# Patient Record
Sex: FEMALE | Race: WHITE | NOT HISPANIC OR LATINO | Employment: OTHER | URBAN - METROPOLITAN AREA
[De-identification: names, ages, dates, MRNs, and addresses within clinical notes are randomized per-mention and may not be internally consistent; named-entity substitution may affect disease eponyms.]

---

## 2018-06-21 ENCOUNTER — OFFICE VISIT (OUTPATIENT)
Dept: URGENT CARE | Facility: CLINIC | Age: 53
End: 2018-06-21
Payer: MEDICARE

## 2018-06-21 VITALS
BODY MASS INDEX: 36.7 KG/M2 | WEIGHT: 215 LBS | RESPIRATION RATE: 18 BRPM | SYSTOLIC BLOOD PRESSURE: 117 MMHG | TEMPERATURE: 97 F | HEIGHT: 64 IN | OXYGEN SATURATION: 95 % | DIASTOLIC BLOOD PRESSURE: 85 MMHG | HEART RATE: 99 BPM

## 2018-06-21 DIAGNOSIS — K11.20 SIALADENITIS: Primary | ICD-10-CM

## 2018-06-21 PROCEDURE — 99213 OFFICE O/P EST LOW 20 MIN: CPT | Mod: S$GLB,,, | Performed by: INTERNAL MEDICINE

## 2018-06-21 PROCEDURE — 3008F BODY MASS INDEX DOCD: CPT | Mod: CPTII,S$GLB,, | Performed by: INTERNAL MEDICINE

## 2018-06-21 RX ORDER — CARBIDOPA, LEVODOPA AND ENTACAPONE 25; 200; 100 MG/1; MG/1; MG/1
1 TABLET, FILM COATED ORAL 3 TIMES DAILY
COMMUNITY

## 2018-06-21 RX ORDER — BACLOFEN 10 MG/1
10 TABLET ORAL 3 TIMES DAILY
COMMUNITY

## 2018-06-21 RX ORDER — AMOXICILLIN AND CLAVULANATE POTASSIUM 875; 125 MG/1; MG/1
1 TABLET, FILM COATED ORAL EVERY 12 HOURS
Qty: 20 TABLET | Refills: 0 | Status: SHIPPED | OUTPATIENT
Start: 2018-06-21 | End: 2018-07-01

## 2018-06-21 RX ORDER — DULOXETIN HYDROCHLORIDE 60 MG/1
60 CAPSULE, DELAYED RELEASE ORAL 2 TIMES DAILY
COMMUNITY

## 2018-06-21 RX ORDER — PYRIDOXINE HCL (VITAMIN B6) 100 MG
50 TABLET ORAL DAILY
COMMUNITY
End: 2023-06-15

## 2018-06-21 NOTE — PROGRESS NOTES
"Subjective:       Patient ID: Emma Morales is a 52 y.o. female.    Vitals:  height is 5' 4" (1.626 m) and weight is 97.5 kg (215 lb). Her temperature is 97.4 °F (36.3 °C). Her blood pressure is 117/85 and her pulse is 99. Her respiration is 18 and oxygen saturation is 95%.     Chief Complaint: Jaw Pain (Right side jaw pain started last ngiht )    Dental Pain    This is a new problem. The current episode started yesterday. The problem occurs constantly. The problem has been unchanged. The pain is mild. Pertinent negatives include no fever. The treatment provided no relief.     Review of Systems   Constitution: Negative for chills and fever.   HENT: Negative for sore throat.    Eyes: Negative for blurred vision.   Cardiovascular: Negative for chest pain.   Respiratory: Negative for shortness of breath.    Skin: Negative for rash.   Musculoskeletal: Negative for back pain and joint pain.   Gastrointestinal: Negative for abdominal pain, diarrhea, nausea and vomiting.   Neurological: Negative for headaches.   Psychiatric/Behavioral: The patient is not nervous/anxious.        Objective:      Physical Exam   Constitutional: She appears well-developed and well-nourished.   HENT:   Head: Normocephalic and atraumatic.   Mouth/Throat:       Eyes: Conjunctivae and EOM are normal. Pupils are equal, round, and reactive to light.   Neck: Normal range of motion. Neck supple.   Nursing note and vitals reviewed.      Assessment:       1. Sialadenitis        Plan:         Sialadenitis  -     amoxicillin-clavulanate 875-125mg (AUGMENTIN) 875-125 mg per tablet; Take 1 tablet by mouth every 12 (twelve) hours. for 10 days  Dispense: 20 tablet; Refill: 0          "

## 2020-05-24 ENCOUNTER — OFFICE VISIT (OUTPATIENT)
Dept: URGENT CARE | Facility: CLINIC | Age: 55
End: 2020-05-24
Payer: MEDICARE

## 2020-05-24 VITALS
HEART RATE: 112 BPM | TEMPERATURE: 99 F | SYSTOLIC BLOOD PRESSURE: 127 MMHG | DIASTOLIC BLOOD PRESSURE: 82 MMHG | OXYGEN SATURATION: 95 % | WEIGHT: 150 LBS | HEIGHT: 64 IN | BODY MASS INDEX: 25.61 KG/M2

## 2020-05-24 DIAGNOSIS — B34.9 VIRAL ILLNESS: Primary | ICD-10-CM

## 2020-05-24 DIAGNOSIS — R05.9 COUGH: ICD-10-CM

## 2020-05-24 DIAGNOSIS — R51.9 NONINTRACTABLE HEADACHE, UNSPECIFIED CHRONICITY PATTERN, UNSPECIFIED HEADACHE TYPE: ICD-10-CM

## 2020-05-24 DIAGNOSIS — R50.9 FEVER, UNSPECIFIED FEVER CAUSE: ICD-10-CM

## 2020-05-24 DIAGNOSIS — R52 GENERALIZED BODY ACHES: ICD-10-CM

## 2020-05-24 PROCEDURE — 99214 PR OFFICE/OUTPT VISIT, EST, LEVL IV, 30-39 MIN: ICD-10-PCS | Mod: S$GLB,,, | Performed by: NURSE PRACTITIONER

## 2020-05-24 PROCEDURE — 99214 OFFICE O/P EST MOD 30 MIN: CPT | Mod: S$GLB,,, | Performed by: NURSE PRACTITIONER

## 2020-05-24 PROCEDURE — U0003 INFECTIOUS AGENT DETECTION BY NUCLEIC ACID (DNA OR RNA); SEVERE ACUTE RESPIRATORY SYNDROME CORONAVIRUS 2 (SARS-COV-2) (CORONAVIRUS DISEASE [COVID-19]), AMPLIFIED PROBE TECHNIQUE, MAKING USE OF HIGH THROUGHPUT TECHNOLOGIES AS DESCRIBED BY CMS-2020-01-R: HCPCS

## 2020-05-24 RX ORDER — ROSUVASTATIN CALCIUM 10 MG/1
TABLET, COATED ORAL
COMMUNITY
Start: 2020-03-06 | End: 2023-02-15 | Stop reason: SDUPTHER

## 2020-05-24 NOTE — PROGRESS NOTES
"Subjective:       Patient ID: Emma Morales is a 54 y.o. female.    Vitals:  height is 5' 4" (1.626 m) and weight is 68 kg (150 lb). Her tympanic temperature is 99.2 °F (37.3 °C). Her blood pressure is 127/82 and her pulse is 112 (abnormal). Her oxygen saturation is 95%.     Chief Complaint: Cough    Pt here today for c/o nocturnal cough, headache, and body aches x3 days. Pt states cough has improved, but she is concerned about the body aches and possibility that symptoms may be related to COVID-19. Pt states she tried to get tested for COVID-19 at a testing site yesterday, but was asked to come another day. Pt recently recovered from bronchitis about 1 month ago. Symptoms had completely resolved before these new symptoms began 3 days ago. PMH significant for asthma. Pt states she has current rescue inhaler, and refills for new RX if needed. States she has not needed to use the inhaler since symptoms began. Denies SOB, wheezing, chest pain, ear pain, alteration in sense of smell or taste, sore throat, rash, or N/V/D.    Cough   This is a new problem. The current episode started in the past 7 days (3 days). The cough is non-productive. Associated symptoms include headaches and myalgias. Pertinent negatives include no chest pain, chills, ear congestion, ear pain, eye redness, fever, heartburn, hemoptysis, nasal congestion, postnasal drip, rash, rhinorrhea, sore throat, shortness of breath, sweats, weight loss or wheezing. Nothing aggravates the symptoms. Her past medical history is significant for asthma and bronchitis. There is no history of bronchiectasis, COPD, emphysema, environmental allergies or pneumonia.       Constitution: Negative for chills, sweating, fatigue and fever.   HENT: Negative for ear pain, congestion, postnasal drip, sinus pain, sinus pressure, sore throat and voice change.    Neck: Negative for painful lymph nodes.   Cardiovascular: Negative for chest pain.   Eyes: Negative for eye redness. "   Respiratory: Negative for chest tightness, cough, sputum production, bloody sputum, COPD, shortness of breath, stridor, wheezing and asthma.    Gastrointestinal: Negative for nausea, vomiting and heartburn.   Musculoskeletal: Positive for muscle ache.   Skin: Negative for rash.   Allergic/Immunologic: Negative for environmental allergies, seasonal allergies and asthma.   Neurological: Positive for headaches.   Hematologic/Lymphatic: Negative for swollen lymph nodes.       Objective:       Due to the state of emergency surrounding the COVID-19 pandemic, limited physical exam was performed today per [facility]'s current protocol.       Physical Exam   Constitutional: She is oriented to person, place, and time. She appears well-developed and well-nourished.   HENT:   Head: Normocephalic and atraumatic.   Right Ear: External ear normal.   Left Ear: External ear normal.   Eyes: Pupils are equal, round, and reactive to light. Conjunctivae and EOM are normal.   Neck: Normal range of motion. Neck supple.   Cardiovascular: Normal rate and regular rhythm.   Pulmonary/Chest: Effort normal. She has wheezes (fine) in the right lower field and the left lower field.   Neurological: She is alert and oriented to person, place, and time.   Psychiatric: She has a normal mood and affect. Her behavior is normal. Judgment and thought content normal.   Nursing note and vitals reviewed.        Assessment:       1. Viral illness    2. Cough    3. Fever, unspecified fever cause    4. Generalized body aches    5. Nonintractable headache, unspecified chronicity pattern, unspecified headache type        Plan:         Viral illness    Cough  -     COVID-19 Routine Screening    Fever, unspecified fever cause  -     COVID-19 Routine Screening    Generalized body aches  -     COVID-19 Routine Screening    Nonintractable headache, unspecified chronicity pattern, unspecified headache type  -     COVID-19 Routine Screening    Discussed diagnosis and  treatment plan today. All applicable EKG, medical records, labs, imaging reviewed in Epic and discussed with patient. Instructed to follow up with PCP or go to ER if symptoms fail to improve or worsen. Pt verbalizes understanding.       Patient Instructions   Instructions for Patients with Confirmed or Suspected COVID-19    If you are awaiting your test result, you will either be called or it will be released to the patient portal.  If you have any questions about your test, please visit [email or web address] or call our COVID-19 information line at [phone].       Stay home and stay away from family members and friends. The CDC says, you can leave home after these three things have happened: 1) You have had no fever for at least 72 hours (that is three full days of no fever without the use of medicine that reduces fevers) 2) AND other symptoms have improved (for example, when your cough or shortness of breath have improved) 3) AND at least 7 days have passed since your symptoms first appeared.   Separate yourself from other people and animals in your home.   Call ahead before visiting your doctor.   Wear a facemask.   Cover your coughs and sneezes.   Wash your hands often with soap and water; hand  can be used, too.   Avoid sharing personal household items.   Wipe down surfaces used daily.   Monitor your symptoms. Seek prompt medical attention if your illness is worsening (e.g., difficulty breathing).    Before seeking care, call your healthcare provider.   If you have a medical emergency and need to call 911, notify the dispatch personnel that you have, or are being evaluated for COVID-19. If possible, put on a facemask before emergency medical services arrive.        Recommended precautions for household members, intimate partners, and caregivers in a home setting of a patient with symptomatic laboratory-confirmed COVID-19 or a patient under investigation.  Household members,  intimate partners, and caregivers in the home setting awaiting tests results have close contact with a person with symptomatic, laboratory-confirmed COVID-19 or a person under investigation. Close contacts should monitor their health; they should call their provider right away if they develop symptoms suggestive of COVID-19 (e.g., fever, cough, shortness of breath).    Close contacts should also follow these recommendations:   Make sure that you understand and can help the patient follow their provider's instructions for medication(s) and care. You should help the patient with basic needs in the home and provide support for getting groceries, prescriptions, and other personal needs.   Monitor the patient's symptoms. If the patient is getting sicker, call his or her healthcare provider and tell them that the patient has laboratory-confirmed COVID-19. If the patient has a medical emergency and you need to call 911, notify the dispatch personnel that the patient has, or is being evaluated for COVID-19.   Household members should stay in another room or be  from the patient. Household members should use a separate bedroom and bathroom, if available.   Prohibit visitors.   Household members should care for any pets in the home.   Make sure that shared spaces in the home have good air flow, such as by an air conditioner or an opened window, weather permitting.   Perform hand hygiene frequently. Wash your hands often with soap and water for at least 20 seconds or use an alcohol-based hand  (that contains > 60% alcohol) covering all surfaces of your hands and rubbing them together until they feel dry. Soap and water should be used preferentially.   Avoid touching your eyes, nose, and mouth.   The patient should wear a facemask. If the patient is not able to wear a facemask (for example, because it causes trouble breathing), caregivers should wear a mask when they are in the same room as the  patient.   Wear a disposable facemask and gloves when you touch or have contact with the patient's blood, stool, or body fluids, such as saliva, sputum, nasal mucus, vomit, urine.  o Throw out disposable facemasks and gloves after using them. Do not reuse.  o When removing personal protective equipment, first remove and dispose of gloves. Then, immediately clean your hands with soap and water or alcohol-based hand . Next, remove and dispose of facemask, and immediately clean your hands again with soap and water or alcohol-based hand .   You should not share dishes, drinking glasses, cups, eating utensils, towels, bedding, or other items with the patient. After the patient uses these items, you should wash them thoroughly (see below Wash laundry thoroughly).   Clean all high-touch surfaces, such as counters, tabletops, doorknobs, bathroom fixtures, toilets, phones, keyboards, tablets, and bedside tables, every day. Also, clean any surfaces that may have blood, stool, or body fluids on them.   Use a household cleaning spray or wipe, according to the label instructions. Labels contain instructions for safe and effective use of the cleaning product including precautions you should take when applying the product, such as wearing gloves and making sure you have good ventilation during use of the product.   Wash laundry thoroughly.  o Immediately remove and wash clothes or bedding that have blood, stool, or body fluids on them.  o Wear disposable gloves while handling soiled items and keep soiled items away from your body. Clean your hands (with soap and water or an alcohol-based hand ) immediately after removing your gloves.  o Read and follow directions on labels of laundry or clothing items and detergent. In general, using a normal laundry detergent according to washing machine instructions and dry thoroughly using the warmest temperatures recommended on the clothing label.   Place  all used disposable gloves, facemasks, and other contaminated items in a lined container before disposing of them with other household waste. Clean your hands (with soap and water or an alcohol-based hand ) immediately after handling these items. Soap and water should be used preferentially if hands are visibly dirty.   Discuss any additional questions with your state or local health department or healthcare provider. Check available hours when contacting your local health department.    For more information see CDC link below.      https://www.cdc.gov/coronavirus/2019-ncov/hcp/guidance-prevent-spread.html#precautions        Sources:  CDC, [facility] and \[facility] [place] [facility]\""

## 2020-05-24 NOTE — PATIENT INSTRUCTIONS
Instructions for Patients with Confirmed or Suspected COVID-19    If you are awaiting your test result, you will either be called or it will be released to the patient portal.  If you have any questions about your test, please visit [email or web address] or call our COVID-19 information line at [phone].       Stay home and stay away from family members and friends. The CDC says, you can leave home after these three things have happened: 1) You have had no fever for at least 72 hours (that is three full days of no fever without the use of medicine that reduces fevers) 2) AND other symptoms have improved (for example, when your cough or shortness of breath have improved) 3) AND at least 7 days have passed since your symptoms first appeared.   Separate yourself from other people and animals in your home.   Call ahead before visiting your doctor.   Wear a facemask.   Cover your coughs and sneezes.   Wash your hands often with soap and water; hand  can be used, too.   Avoid sharing personal household items.   Wipe down surfaces used daily.   Monitor your symptoms. Seek prompt medical attention if your illness is worsening (e.g., difficulty breathing).    Before seeking care, call your healthcare provider.   If you have a medical emergency and need to call 911, notify the dispatch personnel that you have, or are being evaluated for COVID-19. If possible, put on a facemask before emergency medical services arrive.        Recommended precautions for household members, intimate partners, and caregivers in a home setting of a patient with symptomatic laboratory-confirmed COVID-19 or a patient under investigation.  Household members, intimate partners, and caregivers in the home setting awaiting tests results have close contact with a person with symptomatic, laboratory-confirmed COVID-19 or a person under investigation. Close contacts should monitor their health; they should call their  provider right away if they develop symptoms suggestive of COVID-19 (e.g., fever, cough, shortness of breath).    Close contacts should also follow these recommendations:   Make sure that you understand and can help the patient follow their provider's instructions for medication(s) and care. You should help the patient with basic needs in the home and provide support for getting groceries, prescriptions, and other personal needs.   Monitor the patient's symptoms. If the patient is getting sicker, call his or her healthcare provider and tell them that the patient has laboratory-confirmed COVID-19. If the patient has a medical emergency and you need to call 911, notify the dispatch personnel that the patient has, or is being evaluated for COVID-19.   Household members should stay in another room or be  from the patient. Household members should use a separate bedroom and bathroom, if available.   Prohibit visitors.   Household members should care for any pets in the home.   Make sure that shared spaces in the home have good air flow, such as by an air conditioner or an opened window, weather permitting.   Perform hand hygiene frequently. Wash your hands often with soap and water for at least 20 seconds or use an alcohol-based hand  (that contains > 60% alcohol) covering all surfaces of your hands and rubbing them together until they feel dry. Soap and water should be used preferentially.   Avoid touching your eyes, nose, and mouth.   The patient should wear a facemask. If the patient is not able to wear a facemask (for example, because it causes trouble breathing), caregivers should wear a mask when they are in the same room as the patient.   Wear a disposable facemask and gloves when you touch or have contact with the patient's blood, stool, or body fluids, such as saliva, sputum, nasal mucus, vomit, urine.  o Throw out disposable facemasks and gloves after using them. Do not  reuse.  o When removing personal protective equipment, first remove and dispose of gloves. Then, immediately clean your hands with soap and water or alcohol-based hand . Next, remove and dispose of facemask, and immediately clean your hands again with soap and water or alcohol-based hand .   You should not share dishes, drinking glasses, cups, eating utensils, towels, bedding, or other items with the patient. After the patient uses these items, you should wash them thoroughly (see below Wash laundry thoroughly).   Clean all high-touch surfaces, such as counters, tabletops, doorknobs, bathroom fixtures, toilets, phones, keyboards, tablets, and bedside tables, every day. Also, clean any surfaces that may have blood, stool, or body fluids on them.   Use a household cleaning spray or wipe, according to the label instructions. Labels contain instructions for safe and effective use of the cleaning product including precautions you should take when applying the product, such as wearing gloves and making sure you have good ventilation during use of the product.   Wash laundry thoroughly.  o Immediately remove and wash clothes or bedding that have blood, stool, or body fluids on them.  o Wear disposable gloves while handling soiled items and keep soiled items away from your body. Clean your hands (with soap and water or an alcohol-based hand ) immediately after removing your gloves.  o Read and follow directions on labels of laundry or clothing items and detergent. In general, using a normal laundry detergent according to washing machine instructions and dry thoroughly using the warmest temperatures recommended on the clothing label.   Place all used disposable gloves, facemasks, and other contaminated items in a lined container before disposing of them with other household waste. Clean your hands (with soap and water or an alcohol-based hand ) immediately after handling these  items. Soap and water should be used preferentially if hands are visibly dirty.   Discuss any additional questions with your state or local health department or healthcare provider. Check available hours when contacting your local health department.    For more information see CDC link below.      https://www.cdc.gov/coronavirus/2019-ncov/hcp/guidance-prevent-spread.html#precautions        Sources:  [facility], [facility] and [facility]

## 2020-05-26 ENCOUNTER — TELEPHONE (OUTPATIENT)
Dept: EMERGENCY MEDICINE | Facility: HOSPITAL | Age: 55
End: 2020-05-26

## 2020-05-26 LAB — SARS-COV-2 RNA RESP QL NAA+PROBE: NOT DETECTED

## 2020-05-26 NOTE — TELEPHONE ENCOUNTER
Patient left voicemail regarding covid-19 testing. Informed patient of negative COVID-19 PCR testing that was done at . Patient reports that she is currently symptomatic. Patient had questions regarding set up of [employer], advised patient to contact the Help Desk for further assistance in set-up.     Marie Garcia PA-C

## 2020-05-27 ENCOUNTER — TELEPHONE (OUTPATIENT)
Dept: URGENT CARE | Facility: CLINIC | Age: 55
End: 2020-05-27

## 2020-05-27 NOTE — TELEPHONE ENCOUNTER
----- Message from Darwin Huber MD sent at 5/26/2020  9:02 AM CDT -----  Call patient and let know covid test negative

## 2020-11-25 ENCOUNTER — OFFICE VISIT (OUTPATIENT)
Dept: URGENT CARE | Facility: CLINIC | Age: 55
End: 2020-11-25
Payer: MEDICARE

## 2020-11-25 VITALS
HEART RATE: 70 BPM | RESPIRATION RATE: 12 BRPM | TEMPERATURE: 98 F | BODY MASS INDEX: 24.99 KG/M2 | SYSTOLIC BLOOD PRESSURE: 126 MMHG | WEIGHT: 150 LBS | HEIGHT: 65 IN | OXYGEN SATURATION: 98 % | DIASTOLIC BLOOD PRESSURE: 84 MMHG

## 2020-11-25 DIAGNOSIS — J40 BRONCHITIS: Primary | ICD-10-CM

## 2020-11-25 DIAGNOSIS — R07.9 CHEST PAIN, UNSPECIFIED TYPE: ICD-10-CM

## 2020-11-25 LAB
CTP QC/QA: YES
SARS-COV-2 RDRP RESP QL NAA+PROBE: NEGATIVE

## 2020-11-25 PROCEDURE — 93005 ELECTROCARDIOGRAM TRACING: CPT | Mod: S$GLB,,, | Performed by: INTERNAL MEDICINE

## 2020-11-25 PROCEDURE — 71046 X-RAY EXAM CHEST 2 VIEWS: CPT | Mod: FY,S$GLB,, | Performed by: RADIOLOGY

## 2020-11-25 PROCEDURE — 93005 EKG 12-LEAD: ICD-10-PCS | Mod: S$GLB,,, | Performed by: INTERNAL MEDICINE

## 2020-11-25 PROCEDURE — 3008F PR BODY MASS INDEX (BMI) DOCUMENTED: ICD-10-PCS | Mod: CPTII,S$GLB,, | Performed by: INTERNAL MEDICINE

## 2020-11-25 PROCEDURE — U0002 COVID-19 LAB TEST NON-CDC: HCPCS | Mod: QW,S$GLB,, | Performed by: INTERNAL MEDICINE

## 2020-11-25 PROCEDURE — 93010 ELECTROCARDIOGRAM REPORT: CPT | Mod: S$GLB,,, | Performed by: INTERNAL MEDICINE

## 2020-11-25 PROCEDURE — 3008F BODY MASS INDEX DOCD: CPT | Mod: CPTII,S$GLB,, | Performed by: INTERNAL MEDICINE

## 2020-11-25 PROCEDURE — 99214 OFFICE O/P EST MOD 30 MIN: CPT | Mod: S$GLB,,, | Performed by: INTERNAL MEDICINE

## 2020-11-25 PROCEDURE — 71046 XR CHEST PA AND LATERAL: ICD-10-PCS | Mod: FY,S$GLB,, | Performed by: RADIOLOGY

## 2020-11-25 PROCEDURE — U0002: ICD-10-PCS | Mod: QW,S$GLB,, | Performed by: INTERNAL MEDICINE

## 2020-11-25 PROCEDURE — 93010 EKG 12-LEAD: ICD-10-PCS | Mod: S$GLB,,, | Performed by: INTERNAL MEDICINE

## 2020-11-25 PROCEDURE — 99214 PR OFFICE/OUTPT VISIT, EST, LEVL IV, 30-39 MIN: ICD-10-PCS | Mod: S$GLB,,, | Performed by: INTERNAL MEDICINE

## 2020-11-25 RX ORDER — AZITHROMYCIN 250 MG/1
TABLET, FILM COATED ORAL
Qty: 6 TABLET | Refills: 0 | Status: SHIPPED | OUTPATIENT
Start: 2020-11-25 | End: 2020-11-26

## 2020-11-25 RX ORDER — BENZONATATE 100 MG/1
100 CAPSULE ORAL 3 TIMES DAILY PRN
Qty: 30 CAPSULE | Refills: 0 | Status: SHIPPED | OUTPATIENT
Start: 2020-11-25 | End: 2020-12-05

## 2020-11-25 RX ORDER — BENZONATATE 100 MG/1
100 CAPSULE ORAL 3 TIMES DAILY PRN
Qty: 15 CAPSULE | Refills: 0 | Status: SHIPPED | OUTPATIENT
Start: 2020-11-25 | End: 2020-11-25

## 2020-11-25 NOTE — PROGRESS NOTES
"Subjective:       Patient ID: Emma Morales is a 55 y.o. female.    Vitals:  height is 5' 4.5" (1.638 m) and weight is 68 kg (150 lb). Her temperature is 97.8 °F (36.6 °C). Her blood pressure is 126/84 and her pulse is 70. Her respiration is 12 and oxygen saturation is 98%.     Chief Complaint: URI and COVID-19 Concerns    55-year-old female with past medical history Parkinson's and bipolar 1 disorder presents to urgent care complaining of fatigue, sore throat, dry cough, and headache that started 5 days ago.  Symptoms have been constant and severe since onset.  Patient denies recent positive COVID exposure that she is aware of.  Patient also reports having mild substernal chest pain radiating to both shoulders and left jaw that has been occurring intermittently (mostly with coughing - none at rest). Patient has been using an old inhaler and Tessalon Perles she had left over at home with minimal relief. Pt denies recent illness/travel, fever, chills, ear pain, sore throat, difficulty swallowing, nasal congestion, sinus pressure, loss of smell/taste, SOB, palpitations, leg pain/swelling, N/V/D, abdominal pain, back pain, neck pain, vision changes.      URI   This is a new problem. The current episode started in the past 7 days (friday morning). The problem has been unchanged. There has been no fever. Associated symptoms include coughing, headaches and a sore throat. Pertinent negatives include no abdominal pain, chest pain, congestion, diarrhea, dysuria, ear pain, joint pain, joint swelling, nausea, neck pain, plugged ear sensation, rash, rhinorrhea, sinus pain, sneezing, swollen glands, vomiting or wheezing. She has tried inhaler use (tesslon) for the symptoms. The treatment provided mild relief.       Constitution: Positive for fatigue. Negative for chills, sweating and fever.   HENT: Positive for sore throat. Negative for ear pain, congestion, sinus pain, sinus pressure and voice change.    Neck: Negative for neck " pain and painful lymph nodes.   Cardiovascular: Negative for chest pain.   Eyes: Negative for eye redness.   Respiratory: Positive for cough. Negative for chest tightness, sputum production, bloody sputum, COPD, shortness of breath, stridor, wheezing and asthma.    Gastrointestinal: Negative for abdominal pain, nausea, vomiting and diarrhea.   Genitourinary: Negative for dysuria.   Musculoskeletal: Negative for muscle ache.   Skin: Negative for rash.   Allergic/Immunologic: Negative for seasonal allergies, asthma and sneezing.   Neurological: Positive for headaches.   Hematologic/Lymphatic: Negative for swollen lymph nodes.       Objective:      Physical Exam   Constitutional: She is oriented to person, place, and time. She appears well-developed.  Non-toxic appearance. She does not appear ill. No distress.   HENT:   Head: Normocephalic and atraumatic.   Mouth/Throat: Mucous membranes are moist. Oropharynx is clear.   Neck: Neck supple.   Cardiovascular: Normal rate and regular rhythm.   Pulmonary/Chest: Effort normal. No respiratory distress.   Abdominal: Normal appearance.   Musculoskeletal: Normal range of motion.   Neurological: She is alert and oriented to person, place, and time.   Skin: Skin is warm, dry and not diaphoretic. Psychiatric: Her behavior is normal. Mood, judgment and thought content normal.   Nursing note and vitals reviewed.        Assessment:       1. Bronchitis    2. Chest pain, unspecified type        Plan:         Bronchitis  -     POCT COVID-19 Rapid Screening  -     XR CHEST PA AND LATERAL; Future; Expected date: 11/25/2020  -     Discontinue: benzonatate (TESSALON) 100 MG capsule; Take 1 capsule (100 mg total) by mouth 3 (three) times daily as needed.  Dispense: 15 capsule; Refill: 0  -     azithromycin (Z-ALEM) 250 MG tablet; Take 2 tablets by mouth on day 1; Take 1 tablet by mouth on days 2-5  Dispense: 6 tablet; Refill: 0  -     benzonatate (TESSALON) 100 MG capsule; Take 1 capsule (100  mg total) by mouth 3 (three) times daily as needed.  Dispense: 30 capsule; Refill: 0    Chest pain, unspecified type  -     IN OFFICE EKG 12-LEAD (to Muse)      Xr Chest Pa And Lateral    Result Date: 11/25/2020  EXAMINATION: CHEST PA AND LATERAL CLINICAL HISTORY: Cough TECHNIQUE: PA and lateral chest radiograph COMPARISON: 06/24/2010 FINDINGS: The cardiac silhouette is within normal limits.   There is no focal consolidation, pneumothorax, or pleural effusion. Mild spondylitic changes are present.     No acute intrathoracic abnormality. Electronically signed by: Michelle Patel Date:    11/25/2020 Time:    17:06      Results for orders placed or performed in visit on 11/25/20   POCT COVID-19 Rapid Screening   Result Value Ref Range    POC Rapid COVID Negative Negative     Acceptable Yes      EKG: NSR @ 64 bpm. Left Axis Deviation. Nonspecific T wave changes. No acute ST elevation/depression.       *Discussed results/diagnosis/plan with patient in clinic. Educated extensively on COVID19. Answered all of patient's questions and concerns. Patient was given strict ED instructions. Patient verbally understood and agreed with treatment plan.         Patient Instructions   Below are suggestions for symptomatic relief:    - Take Zpak as prescribed on a full stomach until you complete entire dose  - Tylenol every 6 hours as needed for pain/fever/chills/body aches  - Salt water gargles to soothe throat pain.  - Chloroseptic spray also helps to numb throat pain.  - Warm face compresses to help with facial sinus pain/pressure.  - Vicks vapor rub at night.  - Flonase OTC nasal spray for nasal congestion.  - Simple foods like chicken noodle soup, smoothies, hot tea with lemon and honey  - If you were not given a prescription cough medication, then Delsym OTC helps with coughing at night  - take tessalon pearls for daytime cough suppressant   - Zyrtec/Claritin during the day & Benadryl at night may help with any  allergies     If you DO NOT have Hypertension or any history of palpitations, it is ok to take over the counter Sudafed or Mucinex D or Allegra-D/Claritin-D/Zyrtec-D.  If you do take one of the above, it is ok to combine that with plain over the counter Mucinex or Allegra or Claritin or Zyrtec. If, for example, you are taking Zyrtec -D, you can combine that with Mucinex, but not Mucinex-D.  If you are taking Mucinex-D, you can combine that with plain Allegra or Claritin or Zyrtec.     If you DO have Hypertension or palpitations, it is safe to take Coricidin HBP for relief of sinus symptoms.      *Please follow up with your primary care provider within 2-5 days if your signs and symptoms have not resolved or worsen.    *Please go immediately to the Emergency Department if you develop shortness of breath, chest pain, and uncontrollable fever above 100.4F       Please arrange follow up with your primary care doctor as soon as possible. You must understand that you've received an Urgent Care treatment only and that you may be released before all of your medical problems are known or treated. You, the patient, will arrange for follow up as instructed. If your symptoms worsen or fail to improve you should go to the Emergency Room.      SOCIAL DISTANCE + WEAR A MASK :)    Instructions for Patients with Confirmed or Suspected COVID-19    If you are awaiting your test result, you will either be called or it will be released to the patient portal.  If you have any questions about your test, please visit [email or web address] or call our COVID-19 information line at [phone].      Instructions for non-hospitalized or discharged patients with confirmed or suspected COVID-19:       Stay home except to get medical care.    Separate yourself from other people and animals in your home.    Call ahead before visiting your doctor.    Wear a face mask.    Cover your coughs and sneezes.    Clean your hands often.     Avoid sharing personal household items.    Clean all high-touch surfaces every day.    Monitor your symptoms. Seek prompt medical attention if your illness is worsening (e.g., difficulty breathing). Before seeking care, call your healthcare provider.    If you have a medical emergency and must call 911, notify the dispatcher that you have or are being evaluated for COVID-19. If possible, put on a face mask before emergency medical services arrive.    Use the following symptom-based strategy to return to normal activity following a suspected or confirmed case of COVID-19. Continue isolation until:   o At least 3 days (72 hours) have passed since recovery defined as resolution of fever without the use of fever-reducing medications and improvement in respiratory symptoms (e.g. cough, shortness of breath), and   o At least 10 days have passed since the first positive test.       As one of the next steps, you will receive a call or text from the [place] Department of Health ([facility]) COVID-19 Tracing Team. See the contact information below so you know not to ignore the health departments call. It is important that you contact them back immediately so they can help.     Contact Tracer Number:  [phone]  Caller ID for most carriers: [facility]    What is contact tracing?   Contact tracing is a process that helps identify everyone who has been in close contact with an infected person. Contact tracers let those people know they may have been exposed and guide them on next steps. Confidentiality is important for everyone; no one will be told who may have exposed them to the virus.   Your involvement is important. The more we know about where and how this virus is spreading, the better chance we have at stopping it from spreading further.  What does exposure mean?   Exposure means you have been within 6 feet for more than 15 minutes with a person who has or had COVID-19.  What kind of questions do the  contact tracers ask?   A contact tracer will confirm your basic contact information including name, address, phone number, and next of kin, as well as asking about any symptoms you may have had. Theyll also ask you how you think you may have gotten sick, such as places where you may have been exposed to the virus, and people you were with. Those names will never be shared with anyone outside of that call, and will only be used to help trace and stop the spread of the virus.   I have privacy concerns. How will the state use my information?   Your privacy about your health is important. All calls are completed using call centers that use the appropriate health privacy protection measures (HIPAA compliance), meaning that your patient information is safe. No one will ever ask you any questions related to immigration status. Your health comes first.   Do I have to participate?   You do not have to participate, but we strongly encourage you to. Contact tracing can help us catch and control new outbreaks as theyre developing to keep your friends and family safe.   What if I dont hear from anyone?   If you dont receive a call within 24 hours, you can call the number above right away to inquire about your status. That line is open from 8:00 am - 8:00 p.m., 7 days a week.  Contact tracing saves lives! Together, we have the power to beat this virus and keep our loved ones and neighbors safe.       Instructions for household members, intimate partners and caregivers in a non-healthcare setting of a patient with confirmed or suspected COVID-19:         Close contacts should monitor their health and call their healthcare provider right away if they develop symptoms suggestive of COVID-19 (e.g., fever, cough, shortness of breath).    Stay home except to get medical care. Separate yourself from other people and animals in the home.   Monitor the patients symptoms. If the patient is getting sicker, call his or her  healthcare provider. If the patient has a medical emergency and you need to call 911, notify the dispatch personnel that the patient has or is being evaluated for COVID-19.    Wear a facemask when around other people such as sharing a room or vehicle and before entering a healthcare provider's office.   Cover coughs and sneezes with a tissue. Throw used tissues in a lined trash can immediately and wash hands.   Clean hands often with soap and water for at least 20 seconds or with an alcohol-based hand , rubbing hands together until they feel dry. Avoid touching your eyes, nose, and mouth with unwashed hands.   Clean all high-touch; surfaces every day, including counters, tabletops, doorknobs, bathroom fixtures, toilets, phones, keyboards, tablets, bedside tables, etc. Use a household cleaning spray or wipe according to label instructions.   Avoid sharing personal household items such as dishes, drinking glasses, cups, towels, bedding, etc. After these items are used, they should be washed thoroughly with soap and water.   Continue isolation until:   At least 3 days (72 hours) have passed since recovery defined as resolution of fever without the use of fever-reducing medications and improvement in respiratory symptoms (e.g. cough, shortness of breath), and    At least 10 days have passed since the patients first positive test.    https://www.cdc.gov/coronavirus/2019-ncov/your-health/index.htm

## 2020-11-25 NOTE — PATIENT INSTRUCTIONS
Below are suggestions for symptomatic relief:    - Take Zpak as prescribed on a full stomach until you complete entire dose  - Tylenol every 6 hours as needed for pain/fever/chills/body aches  - Salt water gargles to soothe throat pain.  - Chloroseptic spray also helps to numb throat pain.  - Warm face compresses to help with facial sinus pain/pressure.  - Vicks vapor rub at night.  - Flonase OTC nasal spray for nasal congestion.  - Simple foods like chicken noodle soup, smoothies, hot tea with lemon and honey  - If you were not given a prescription cough medication, then Delsym OTC helps with coughing at night  - take tessalon pearls for daytime cough suppressant   - Zyrtec/Claritin during the day & Benadryl at night may help with any allergies     If you DO NOT have Hypertension or any history of palpitations, it is ok to take over the counter Sudafed or Mucinex D or Allegra-D/Claritin-D/Zyrtec-D.  If you do take one of the above, it is ok to combine that with plain over the counter Mucinex or Allegra or Claritin or Zyrtec. If, for example, you are taking Zyrtec -D, you can combine that with Mucinex, but not Mucinex-D.  If you are taking Mucinex-D, you can combine that with plain Allegra or Claritin or Zyrtec.     If you DO have Hypertension or palpitations, it is safe to take Coricidin HBP for relief of sinus symptoms.      *Please follow up with your primary care provider within 2-5 days if your signs and symptoms have not resolved or worsen.    *Please go immediately to the Emergency Department if you develop shortness of breath, chest pain, and uncontrollable fever above 100.4F       Please arrange follow up with your primary care doctor as soon as possible. You must understand that you've received an Urgent Care treatment only and that you may be released before all of your medical problems are known or treated. You, the patient, will arrange for follow up as instructed. If your symptoms worsen or fail to  improve you should go to the Emergency Room.      SOCIAL DISTANCE + WEAR A MASK :)    Instructions for Patients with Confirmed or Suspected COVID-19    If you are awaiting your test result, you will either be called or it will be released to the patient portal.  If you have any questions about your test, please visit [email or web address] or call our COVID-19 information line at [phone].      Instructions for non-hospitalized or discharged patients with confirmed or suspected COVID-19:       Stay home except to get medical care.    Separate yourself from other people and animals in your home.    Call ahead before visiting your doctor.    Wear a face mask.    Cover your coughs and sneezes.    Clean your hands often.    Avoid sharing personal household items.    Clean all high-touch surfaces every day.    Monitor your symptoms. Seek prompt medical attention if your illness is worsening (e.g., difficulty breathing). Before seeking care, call your healthcare provider.    If you have a medical emergency and must call 911, notify the dispatcher that you have or are being evaluated for COVID-19. If possible, put on a face mask before emergency medical services arrive.    Use the following symptom-based strategy to return to normal activity following a suspected or confirmed case of COVID-19. Continue isolation until:   o At least 3 days (72 hours) have passed since recovery defined as resolution of fever without the use of fever-reducing medications and improvement in respiratory symptoms (e.g. cough, shortness of breath), and   o At least 10 days have passed since the first positive test.       As one of the next steps, you will receive a call or text from the [place] Department of Health ([facility]) COVID-19 Tracing Team. See the contact information below so you know not to ignore the health departments call. It is important that you contact them back immediately so they can help.     Contact  Tracer Number:  [phone]  Caller ID for most carriers: [facility]    What is contact tracing?   Contact tracing is a process that helps identify everyone who has been in close contact with an infected person. Contact tracers let those people know they may have been exposed and guide them on next steps. Confidentiality is important for everyone; no one will be told who may have exposed them to the virus.   Your involvement is important. The more we know about where and how this virus is spreading, the better chance we have at stopping it from spreading further.  What does exposure mean?   Exposure means you have been within 6 feet for more than 15 minutes with a person who has or had COVID-19.  What kind of questions do the contact tracers ask?   A contact tracer will confirm your basic contact information including name, address, phone number, and next of kin, as well as asking about any symptoms you may have had. Theyll also ask you how you think you may have gotten sick, such as places where you may have been exposed to the virus, and people you were with. Those names will never be shared with anyone outside of that call, and will only be used to help trace and stop the spread of the virus.   I have privacy concerns. How will the state use my information?   Your privacy about your health is important. All calls are completed using call centers that use the appropriate health privacy protection measures (HIPAA compliance), meaning that your patient information is safe. No one will ever ask you any questions related to immigration status. Your health comes first.   Do I have to participate?   You do not have to participate, but we strongly encourage you to. Contact tracing can help us catch and control new outbreaks as theyre developing to keep your friends and family safe.   What if I dont hear from anyone?   If you dont receive a call within 24 hours, you can call the number above right away  to inquire about your status. That line is open from 8:00 am - 8:00 p.m., 7 days a week.  Contact tracing saves lives! Together, we have the power to beat this virus and keep our loved ones and neighbors safe.       Instructions for household members, intimate partners and caregivers in a non-healthcare setting of a patient with confirmed or suspected COVID-19:         Close contacts should monitor their health and call their healthcare provider right away if they develop symptoms suggestive of COVID-19 (e.g., fever, cough, shortness of breath).    Stay home except to get medical care. Separate yourself from other people and animals in the home.   Monitor the patients symptoms. If the patient is getting sicker, call his or her healthcare provider. If the patient has a medical emergency and you need to call 911, notify the dispatch personnel that the patient has or is being evaluated for COVID-19.    Wear a facemask when around other people such as sharing a room or vehicle and before entering a healthcare provider's office.   Cover coughs and sneezes with a tissue. Throw used tissues in a lined trash can immediately and wash hands.   Clean hands often with soap and water for at least 20 seconds or with an alcohol-based hand , rubbing hands together until they feel dry. Avoid touching your eyes, nose, and mouth with unwashed hands.   Clean all high-touch; surfaces every day, including counters, tabletops, doorknobs, bathroom fixtures, toilets, phones, keyboards, tablets, bedside tables, etc. Use a household cleaning spray or wipe according to label instructions.   Avoid sharing personal household items such as dishes, drinking glasses, cups, towels, bedding, etc. After these items are used, they should be washed thoroughly with soap and water.   Continue isolation until:   At least 3 days (72 hours) have passed since recovery defined as resolution of fever without the use of fever-reducing  medications and improvement in respiratory symptoms (e.g. cough, shortness of breath), and    At least 10 days have passed since the patients first positive test.    https://www.cdc.gov/coronavirus/2019-ncov/your-health/index.htm

## 2020-11-26 RX ORDER — AZITHROMYCIN 250 MG/1
TABLET, FILM COATED ORAL
Qty: 6 TABLET | Refills: 0 | Status: SHIPPED | OUTPATIENT
Start: 2020-11-26 | End: 2020-12-01

## 2021-05-17 ENCOUNTER — OFFICE VISIT (OUTPATIENT)
Dept: URGENT CARE | Facility: CLINIC | Age: 56
End: 2021-05-17
Payer: MEDICARE

## 2021-05-17 VITALS
WEIGHT: 150 LBS | TEMPERATURE: 99 F | DIASTOLIC BLOOD PRESSURE: 75 MMHG | HEIGHT: 65 IN | OXYGEN SATURATION: 94 % | SYSTOLIC BLOOD PRESSURE: 122 MMHG | BODY MASS INDEX: 24.99 KG/M2 | HEART RATE: 83 BPM | RESPIRATION RATE: 18 BRPM

## 2021-05-17 DIAGNOSIS — T14.8XXA BLISTER: Primary | ICD-10-CM

## 2021-05-17 PROCEDURE — 3008F BODY MASS INDEX DOCD: CPT | Mod: CPTII,S$GLB,, | Performed by: NURSE PRACTITIONER

## 2021-05-17 PROCEDURE — 99214 PR OFFICE/OUTPT VISIT, EST, LEVL IV, 30-39 MIN: ICD-10-PCS | Mod: S$GLB,,, | Performed by: NURSE PRACTITIONER

## 2021-05-17 PROCEDURE — 3008F PR BODY MASS INDEX (BMI) DOCUMENTED: ICD-10-PCS | Mod: CPTII,S$GLB,, | Performed by: NURSE PRACTITIONER

## 2021-05-17 PROCEDURE — 99214 OFFICE O/P EST MOD 30 MIN: CPT | Mod: S$GLB,,, | Performed by: NURSE PRACTITIONER

## 2021-05-17 RX ORDER — MUPIROCIN 20 MG/G
OINTMENT TOPICAL
Qty: 22 G | Refills: 1 | Status: SHIPPED | OUTPATIENT
Start: 2021-05-17

## 2021-05-28 ENCOUNTER — OFFICE VISIT (OUTPATIENT)
Dept: URGENT CARE | Facility: CLINIC | Age: 56
End: 2021-05-28
Payer: MEDICARE

## 2021-05-28 VITALS
DIASTOLIC BLOOD PRESSURE: 83 MMHG | HEART RATE: 83 BPM | HEIGHT: 65 IN | TEMPERATURE: 99 F | OXYGEN SATURATION: 95 % | SYSTOLIC BLOOD PRESSURE: 115 MMHG | WEIGHT: 160 LBS | BODY MASS INDEX: 26.66 KG/M2

## 2021-05-28 DIAGNOSIS — N61.0 CELLULITIS OF LEFT BREAST: Primary | ICD-10-CM

## 2021-05-28 DIAGNOSIS — L03.032 CELLULITIS OF LEFT TOE: ICD-10-CM

## 2021-05-28 PROCEDURE — 3008F PR BODY MASS INDEX (BMI) DOCUMENTED: ICD-10-PCS | Mod: CPTII,S$GLB,, | Performed by: NURSE PRACTITIONER

## 2021-05-28 PROCEDURE — 3008F BODY MASS INDEX DOCD: CPT | Mod: CPTII,S$GLB,, | Performed by: NURSE PRACTITIONER

## 2021-05-28 PROCEDURE — 99213 PR OFFICE/OUTPT VISIT, EST, LEVL III, 20-29 MIN: ICD-10-PCS | Mod: S$GLB,,, | Performed by: NURSE PRACTITIONER

## 2021-05-28 PROCEDURE — 99213 OFFICE O/P EST LOW 20 MIN: CPT | Mod: S$GLB,,, | Performed by: NURSE PRACTITIONER

## 2021-05-28 RX ORDER — CEPHALEXIN 500 MG/1
500 CAPSULE ORAL EVERY 6 HOURS
Qty: 28 CAPSULE | Refills: 0 | Status: SHIPPED | OUTPATIENT
Start: 2021-05-28 | End: 2021-05-29

## 2021-05-29 ENCOUNTER — TELEPHONE (OUTPATIENT)
Dept: URGENT CARE | Facility: CLINIC | Age: 56
End: 2021-05-29

## 2021-05-29 DIAGNOSIS — L03.032 CELLULITIS OF TOE OF LEFT FOOT: Primary | ICD-10-CM

## 2021-05-29 DIAGNOSIS — N61.0 CELLULITIS OF LEFT BREAST: ICD-10-CM

## 2021-05-29 RX ORDER — CLINDAMYCIN HYDROCHLORIDE 300 MG/1
300 CAPSULE ORAL EVERY 8 HOURS
Qty: 21 CAPSULE | Refills: 0 | Status: SHIPPED | OUTPATIENT
Start: 2021-05-29 | End: 2021-06-05

## 2022-05-19 ENCOUNTER — OFFICE VISIT (OUTPATIENT)
Dept: URGENT CARE | Facility: CLINIC | Age: 57
End: 2022-05-19
Payer: MEDICARE

## 2022-05-19 VITALS
HEART RATE: 80 BPM | OXYGEN SATURATION: 95 % | DIASTOLIC BLOOD PRESSURE: 67 MMHG | SYSTOLIC BLOOD PRESSURE: 99 MMHG | HEIGHT: 64 IN | TEMPERATURE: 98 F | BODY MASS INDEX: 27.33 KG/M2 | RESPIRATION RATE: 16 BRPM | WEIGHT: 160.06 LBS

## 2022-05-19 DIAGNOSIS — S60.521A BLISTER OF RIGHT HAND, INITIAL ENCOUNTER: Primary | ICD-10-CM

## 2022-05-19 PROCEDURE — 3008F BODY MASS INDEX DOCD: CPT | Mod: CPTII,S$GLB,, | Performed by: STUDENT IN AN ORGANIZED HEALTH CARE EDUCATION/TRAINING PROGRAM

## 2022-05-19 PROCEDURE — 1159F PR MEDICATION LIST DOCUMENTED IN MEDICAL RECORD: ICD-10-PCS | Mod: CPTII,S$GLB,, | Performed by: STUDENT IN AN ORGANIZED HEALTH CARE EDUCATION/TRAINING PROGRAM

## 2022-05-19 PROCEDURE — 99213 PR OFFICE/OUTPT VISIT, EST, LEVL III, 20-29 MIN: ICD-10-PCS | Mod: S$GLB,,, | Performed by: STUDENT IN AN ORGANIZED HEALTH CARE EDUCATION/TRAINING PROGRAM

## 2022-05-19 PROCEDURE — 3074F PR MOST RECENT SYSTOLIC BLOOD PRESSURE < 130 MM HG: ICD-10-PCS | Mod: CPTII,S$GLB,, | Performed by: STUDENT IN AN ORGANIZED HEALTH CARE EDUCATION/TRAINING PROGRAM

## 2022-05-19 PROCEDURE — 3078F DIAST BP <80 MM HG: CPT | Mod: CPTII,S$GLB,, | Performed by: STUDENT IN AN ORGANIZED HEALTH CARE EDUCATION/TRAINING PROGRAM

## 2022-05-19 PROCEDURE — 1160F PR REVIEW ALL MEDS BY PRESCRIBER/CLIN PHARMACIST DOCUMENTED: ICD-10-PCS | Mod: CPTII,S$GLB,, | Performed by: STUDENT IN AN ORGANIZED HEALTH CARE EDUCATION/TRAINING PROGRAM

## 2022-05-19 PROCEDURE — 3078F PR MOST RECENT DIASTOLIC BLOOD PRESSURE < 80 MM HG: ICD-10-PCS | Mod: CPTII,S$GLB,, | Performed by: STUDENT IN AN ORGANIZED HEALTH CARE EDUCATION/TRAINING PROGRAM

## 2022-05-19 PROCEDURE — 99213 OFFICE O/P EST LOW 20 MIN: CPT | Mod: S$GLB,,, | Performed by: STUDENT IN AN ORGANIZED HEALTH CARE EDUCATION/TRAINING PROGRAM

## 2022-05-19 PROCEDURE — 1160F RVW MEDS BY RX/DR IN RCRD: CPT | Mod: CPTII,S$GLB,, | Performed by: STUDENT IN AN ORGANIZED HEALTH CARE EDUCATION/TRAINING PROGRAM

## 2022-05-19 PROCEDURE — 1159F MED LIST DOCD IN RCRD: CPT | Mod: CPTII,S$GLB,, | Performed by: STUDENT IN AN ORGANIZED HEALTH CARE EDUCATION/TRAINING PROGRAM

## 2022-05-19 PROCEDURE — 3008F PR BODY MASS INDEX (BMI) DOCUMENTED: ICD-10-PCS | Mod: CPTII,S$GLB,, | Performed by: STUDENT IN AN ORGANIZED HEALTH CARE EDUCATION/TRAINING PROGRAM

## 2022-05-19 PROCEDURE — 3074F SYST BP LT 130 MM HG: CPT | Mod: CPTII,S$GLB,, | Performed by: STUDENT IN AN ORGANIZED HEALTH CARE EDUCATION/TRAINING PROGRAM

## 2022-05-19 NOTE — PATIENT INSTRUCTIONS
Begin to apply topical antibiotic ointment to the affected areas 2-3 times daily.     Keep your hands clean and dry.     Ok to keep the blisters covered with band-aids.

## 2022-05-19 NOTE — PROGRESS NOTES
"Subjective:       Patient ID: Emma Morales is a 56 y.o. female.    Vitals:  height is 5' 4" (1.626 m) and weight is 72.6 kg (160 lb 0.9 oz). Her temperature is 98.2 °F (36.8 °C). Her blood pressure is 99/67 and her pulse is 80. Her respiration is 16 and oxygen saturation is 95%.     Chief Complaint: Hand Injury    Patient states that she was trying to open her door and kept using the credit card to unlock the door and scratched her hand up on yesterday evening.     Hand Injury   Her dominant hand is their right hand. The incident occurred 12 to 24 hours ago. The incident occurred at home. There was no injury mechanism. The pain is present in the right hand. Quality: tightness and throbbing and sharp joint pain. The pain radiates to the right arm and left arm. The pain is at a severity of 9/10. The pain is severe. The pain has been constant since the incident. Nothing aggravates the symptoms.     ROS    Objective:      Physical Exam   Constitutional: She is oriented to person, place, and time. She does not appear ill. No distress.   HENT:   Head: Normocephalic.   Eyes: Conjunctivae are normal.   Pulmonary/Chest: No respiratory distress.   Neurological: She is alert and oriented to person, place, and time. Coordination normal.   Skin: Skin is warm and dry.         Comments: 2 blisters, one on thumb and one index finger of right hand. Index finger blister is open with clear drainage, thumb scabbing over. No surrounding erythema or warmth. Mild swelling.    Psychiatric: Her behavior is normal. Mood and thought content normal.   Nursing note and vitals reviewed.        Assessment:       1. Blister of right hand, initial encounter          Plan:         Blister of right hand, initial encounter    Avoid exacerbating activities (I.e. using a credit card to unlock doors) to avoid further friction. Wound care discussed. Supportive care. F/u for signs of infection            "

## 2022-12-07 ENCOUNTER — OFFICE VISIT (OUTPATIENT)
Dept: URGENT CARE | Facility: CLINIC | Age: 57
End: 2022-12-07
Payer: MEDICARE

## 2022-12-07 VITALS
TEMPERATURE: 98 F | BODY MASS INDEX: 27.31 KG/M2 | RESPIRATION RATE: 18 BRPM | HEART RATE: 81 BPM | OXYGEN SATURATION: 96 % | WEIGHT: 160 LBS | DIASTOLIC BLOOD PRESSURE: 79 MMHG | HEIGHT: 64 IN | SYSTOLIC BLOOD PRESSURE: 129 MMHG

## 2022-12-07 DIAGNOSIS — Z87.09 HISTORY OF BRONCHITIS: ICD-10-CM

## 2022-12-07 DIAGNOSIS — J06.9 URI, ACUTE: ICD-10-CM

## 2022-12-07 DIAGNOSIS — R05.1 ACUTE COUGH: Primary | ICD-10-CM

## 2022-12-07 LAB
CTP QC/QA: YES
SARS-COV-2 AG RESP QL IA.RAPID: NEGATIVE

## 2022-12-07 PROCEDURE — 1159F MED LIST DOCD IN RCRD: CPT | Mod: CPTII,S$GLB,, | Performed by: FAMILY MEDICINE

## 2022-12-07 PROCEDURE — U0002 SARS CORONAVIRUS 2 ANTIGEN POCT, MANUAL READ: ICD-10-PCS | Mod: QW,S$GLB,, | Performed by: FAMILY MEDICINE

## 2022-12-07 PROCEDURE — 3074F PR MOST RECENT SYSTOLIC BLOOD PRESSURE < 130 MM HG: ICD-10-PCS | Mod: CPTII,S$GLB,, | Performed by: FAMILY MEDICINE

## 2022-12-07 PROCEDURE — 3074F SYST BP LT 130 MM HG: CPT | Mod: CPTII,S$GLB,, | Performed by: FAMILY MEDICINE

## 2022-12-07 PROCEDURE — 99213 OFFICE O/P EST LOW 20 MIN: CPT | Mod: S$GLB,,, | Performed by: FAMILY MEDICINE

## 2022-12-07 PROCEDURE — 1159F PR MEDICATION LIST DOCUMENTED IN MEDICAL RECORD: ICD-10-PCS | Mod: CPTII,S$GLB,, | Performed by: FAMILY MEDICINE

## 2022-12-07 PROCEDURE — U0002 COVID-19 LAB TEST NON-CDC: HCPCS | Mod: QW,S$GLB,, | Performed by: FAMILY MEDICINE

## 2022-12-07 PROCEDURE — 3008F PR BODY MASS INDEX (BMI) DOCUMENTED: ICD-10-PCS | Mod: CPTII,S$GLB,, | Performed by: FAMILY MEDICINE

## 2022-12-07 PROCEDURE — 3008F BODY MASS INDEX DOCD: CPT | Mod: CPTII,S$GLB,, | Performed by: FAMILY MEDICINE

## 2022-12-07 PROCEDURE — 3078F DIAST BP <80 MM HG: CPT | Mod: CPTII,S$GLB,, | Performed by: FAMILY MEDICINE

## 2022-12-07 PROCEDURE — 99213 PR OFFICE/OUTPT VISIT, EST, LEVL III, 20-29 MIN: ICD-10-PCS | Mod: S$GLB,,, | Performed by: FAMILY MEDICINE

## 2022-12-07 PROCEDURE — 3078F PR MOST RECENT DIASTOLIC BLOOD PRESSURE < 80 MM HG: ICD-10-PCS | Mod: CPTII,S$GLB,, | Performed by: FAMILY MEDICINE

## 2022-12-07 PROCEDURE — 1160F RVW MEDS BY RX/DR IN RCRD: CPT | Mod: CPTII,S$GLB,, | Performed by: FAMILY MEDICINE

## 2022-12-07 PROCEDURE — 1160F PR REVIEW ALL MEDS BY PRESCRIBER/CLIN PHARMACIST DOCUMENTED: ICD-10-PCS | Mod: CPTII,S$GLB,, | Performed by: FAMILY MEDICINE

## 2022-12-07 RX ORDER — AZITHROMYCIN 250 MG/1
TABLET, FILM COATED ORAL
Qty: 6 TABLET | Refills: 0 | Status: SHIPPED | OUTPATIENT
Start: 2022-12-07 | End: 2022-12-12

## 2022-12-07 RX ORDER — GLYCOPYRROLATE AND FORMOTEROL FUMARATE 9; 4.8 UG/1; UG/1
2 AEROSOL, METERED RESPIRATORY (INHALATION) 2 TIMES DAILY
Qty: 10.7 G | Refills: 0 | Status: SHIPPED | OUTPATIENT
Start: 2022-12-07

## 2022-12-07 RX ORDER — BENZONATATE 200 MG/1
200 CAPSULE ORAL 3 TIMES DAILY PRN
Qty: 30 CAPSULE | Refills: 0 | Status: SHIPPED | OUTPATIENT
Start: 2022-12-07 | End: 2022-12-17

## 2022-12-07 RX ORDER — FLUTICASONE PROPIONATE 50 MCG
2 SPRAY, SUSPENSION (ML) NASAL 2 TIMES DAILY PRN
Qty: 9.9 ML | Refills: 0 | Status: SHIPPED | OUTPATIENT
Start: 2022-12-07 | End: 2023-02-15

## 2022-12-07 NOTE — PROGRESS NOTES
"Subjective:       Patient ID: Emma Morales is a 57 y.o. female.    Vitals:  height is 5' 4" (1.626 m) and weight is 72.6 kg (160 lb). Her temperature is 97.6 °F (36.4 °C). Her blood pressure is 129/79 and her pulse is 81. Her respiration is 18 and oxygen saturation is 96%.     Chief Complaint: Cough    Pt complains x4 days of cough, nasal congestion, post nasal drip, sore throat, bodyache, nausea, headache, sinus pressure. Patient states her PCP sent over prescriptions for Z-ghulam and Bevespi (some sort of error/ issue with the medications) and she wants me to resend medications to her pharmacy.    Cough  This is a new problem. The current episode started in the past 7 days. The problem has been unchanged. The problem occurs constantly. The cough is Non-productive. Associated symptoms include headaches, nasal congestion, postnasal drip and a sore throat. Pertinent negatives include no chest pain, chills, ear congestion, ear pain, fever, heartburn, hemoptysis, myalgias, rash, rhinorrhea, shortness of breath, sweats, weight loss or wheezing.     Constitution: Negative for chills and fever.   HENT:  Positive for postnasal drip and sore throat. Negative for ear pain.    Cardiovascular:  Negative for chest pain.   Respiratory:  Positive for cough. Negative for bloody sputum, shortness of breath and wheezing.    Gastrointestinal:  Negative for heartburn.   Musculoskeletal:  Negative for muscle ache.   Skin:  Negative for rash.   Neurological:  Positive for headaches.     Objective:      Vitals:    12/07/22 1723   BP: 129/79   Pulse: 81   Resp: 18   Temp: 97.6 °F (36.4 °C)   SpO2: 96%   Weight: 72.6 kg (160 lb)   Height: 5' 4" (1.626 m)      Physical Exam   Constitutional: She is oriented to person, place, and time. She appears well-developed. She is cooperative.  Non-toxic appearance. She does not appear ill. No distress.   HENT:   Head: Normocephalic and atraumatic.   Ears:   Right Ear: Hearing, tympanic membrane, " external ear and ear canal normal.   Left Ear: Hearing, tympanic membrane, external ear and ear canal normal.   Nose: Congestion present. No mucosal edema, rhinorrhea or nasal deformity. No epistaxis. Right sinus exhibits no maxillary sinus tenderness and no frontal sinus tenderness. Left sinus exhibits no maxillary sinus tenderness and no frontal sinus tenderness.   Mouth/Throat: Uvula is midline and mucous membranes are normal. No trismus in the jaw. Normal dentition. No uvula swelling. Posterior oropharyngeal erythema present. No oropharyngeal exudate or posterior oropharyngeal edema.   Eyes: Conjunctivae and lids are normal. No scleral icterus.   Neck: Trachea normal and phonation normal. Neck supple. No edema present. No erythema present. No neck rigidity present.   Cardiovascular: Normal rate, regular rhythm, normal heart sounds and normal pulses.   Pulmonary/Chest: Effort normal and breath sounds normal. No respiratory distress. She has no decreased breath sounds. She has no rhonchi.   Abdominal: Normal appearance and bowel sounds are normal. Soft.   Musculoskeletal: Normal range of motion.         General: No deformity. Normal range of motion.   Neurological: She is alert and oriented to person, place, and time. She exhibits normal muscle tone. Coordination normal.   Skin: Skin is warm, dry, intact, not diaphoretic and not pale.   Psychiatric: Her speech is normal and behavior is normal. Judgment and thought content normal.   Nursing note and vitals reviewed.      Results for orders placed or performed in visit on 12/07/22   SARS Coronavirus 2 Antigen, POCT Manual Read   Result Value Ref Range    SARS Coronavirus 2 Antigen Negative Negative     Acceptable Yes       Assessment:       1. Acute cough    2. URI, acute    3. History of bronchitis          Plan:         Acute cough  -     SARS Coronavirus 2 Antigen, POCT Manual Read  -     benzonatate (TESSALON) 200 MG capsule; Take 1 capsule (200  mg total) by mouth 3 (three) times daily as needed for Cough.  Dispense: 30 capsule; Refill: 0    2. URI, acute  -     fluticasone propionate (FLONASE) 50 mcg/actuation nasal spray; 2 sprays (100 mcg total) by Each Nostril route 2 (two) times daily as needed for Rhinitis.  Dispense: 9.9 mL; Refill: 0    3. History of bronchitis  -     glycopyrrolate-formoteroL (BEVESPI AEROSPHERE) 9-4.8 mcg HFAA; Inhale 2 puffs into the lungs 2 (two) times daily. Controller  Dispense: 10.7 g; Refill: 0  THIS WAS SENT PER PATIENT'S REQUEST: INSTRUCTED THAT SHE WILL NEED TO FOLLOW UP WITH HER PCP IS ANY ISSUES WITH PRIOR AUTHORIZATION/ COST/ FUTURE REFILLS    Patient Instructions   Ok to take tylenol if needed for pain as long as no history of liver disease.    Below are suggestions for symptomatic relief of your upper respiratory symptoms:              -Salt water gargles to soothe throat pain.              -Chloroseptic spray and Cepacol lozenges also help to numb throat pain.              -Warm herbal teas with honey/lemon/ginger can help soothe sore throat and hoarseness              -Nasal saline spray reduces inflammation and dryness.              -Warm face compresses to help with facial sinus pain/pressure.              -Humidifiers and steam can help with nasal dryness and congestion              -Vicks vapor rub at night for chest congestion.              -Flonase OTC or Nasacort OTC for nasal congestion and post-nasal drip. Ok to use twice daily for the first week, then reduce to once daily after symptoms have begun to improve.              -Afrin is a nasal spray that can give immediate relief of nasal congestion but you cannot use this medication for more than 3 days              -Simple foods like chicken noodle soup.              - Mucinex for congestion or Mucinex DM for cough during the day time. Delsym helps with coughing at night. Mucinex-D if you have sinus pressure/sinus pain or chest congestion. (caution if  history of high blood pressure or palpitations). You must increase your water intake when using expectorants (Mucinex).             -Zyrtec/Claritin/Allegra/Xyzal should help with allergies.  -If you DO NOT have Hypertension or any history of palpitations, it is ok to take over the counter Sudafed or Mucinex D or Allegra-D or Claritin-D or Zyrtec-D.  -If you do take one of the above, it is ok to combine that with plain over the counter Mucinex or Allegra or Claritin or Zyrtec. If, for example, you are taking Zyrtec -D, you can combine that with Mucinex, but not Mucinex-D.  If you are taking Mucinex-D, you can combine that with plain Allegra or Claritin or Zyrtec.   -If you DO have Hypertension or palpitations, it is safe to take Coricidin HBP for relief of sinus symptoms.     It is reasonable to start antibiotics for superimposed bacterial infection   Other orders  -     azithromycin (Z-ALEM) 250 MG tablet; Take 2 tablets by mouth on day 1; Take 1 tablet by mouth on days 2-5  Dispense: 6 tablet; Refill: 0    Seek immediate care in the emergency room in the event of severe abdominal pain, chest pain, respiratory distress, fever unresponsive to antipyretic, dehydration, loss of consciousness, seizure.

## 2022-12-07 NOTE — PATIENT INSTRUCTIONS
Ok to take tylenol if needed for pain as long as no history of liver disease.    Below are suggestions for symptomatic relief of your upper respiratory symptoms:              -Salt water gargles to soothe throat pain.              -Chloroseptic spray and Cepacol lozenges also help to numb throat pain.              -Warm herbal teas with honey/lemon/nena can help soothe sore throat and hoarseness              -Nasal saline spray reduces inflammation and dryness.              -Warm face compresses to help with facial sinus pain/pressure.              -Humidifiers and steam can help with nasal dryness and congestion              -Vicks vapor rub at night for chest congestion.              -Flonase OTC or Nasacort OTC for nasal congestion and post-nasal drip. Ok to use twice daily for the first week, then reduce to once daily after symptoms have begun to improve.              -Afrin is a nasal spray that can give immediate relief of nasal congestion but you cannot use this medication for more than 3 days              -Simple foods like chicken noodle soup.              - Mucinex for congestion or Mucinex DM for cough during the day time. Delsym helps with coughing at night. Mucinex-D if you have sinus pressure/sinus pain or chest congestion. (caution if history of high blood pressure or palpitations). You must increase your water intake when using expectorants (Mucinex).             -Zyrtec/Claritin/Allegra/Xyzal should help with allergies.  -If you DO NOT have Hypertension or any history of palpitations, it is ok to take over the counter Sudafed or Mucinex D or Allegra-D or Claritin-D or Zyrtec-D.  -If you do take one of the above, it is ok to combine that with plain over the counter Mucinex or Allegra or Claritin or Zyrtec. If, for example, you are taking Zyrtec -D, you can combine that with Mucinex, but not Mucinex-D.  If you are taking Mucinex-D, you can combine that with plain Allegra or Claritin or Zyrtec.   -If  you DO have Hypertension or palpitations, it is safe to take Coricidin HBP for relief of sinus symptoms.     Seek immediate care in the emergency room in the event of severe abdominal pain, chest pain, respiratory distress, fever unresponsive to antipyretic, dehydration, loss of consciousness, seizure.

## 2022-12-21 ENCOUNTER — OFFICE VISIT (OUTPATIENT)
Dept: URGENT CARE | Facility: CLINIC | Age: 57
End: 2022-12-21
Payer: MEDICARE

## 2022-12-21 VITALS
BODY MASS INDEX: 27.31 KG/M2 | TEMPERATURE: 98 F | OXYGEN SATURATION: 95 % | DIASTOLIC BLOOD PRESSURE: 73 MMHG | WEIGHT: 160 LBS | SYSTOLIC BLOOD PRESSURE: 112 MMHG | RESPIRATION RATE: 16 BRPM | HEART RATE: 91 BPM | HEIGHT: 64 IN

## 2022-12-21 DIAGNOSIS — M25.531 PAIN IN BOTH WRISTS: Primary | ICD-10-CM

## 2022-12-21 DIAGNOSIS — M25.532 PAIN IN BOTH WRISTS: Primary | ICD-10-CM

## 2022-12-21 PROCEDURE — 3008F PR BODY MASS INDEX (BMI) DOCUMENTED: ICD-10-PCS | Mod: CPTII,S$GLB,,

## 2022-12-21 PROCEDURE — 1160F PR REVIEW ALL MEDS BY PRESCRIBER/CLIN PHARMACIST DOCUMENTED: ICD-10-PCS | Mod: CPTII,S$GLB,,

## 2022-12-21 PROCEDURE — 1160F RVW MEDS BY RX/DR IN RCRD: CPT | Mod: CPTII,S$GLB,,

## 2022-12-21 PROCEDURE — 3074F PR MOST RECENT SYSTOLIC BLOOD PRESSURE < 130 MM HG: ICD-10-PCS | Mod: CPTII,S$GLB,,

## 2022-12-21 PROCEDURE — 3078F PR MOST RECENT DIASTOLIC BLOOD PRESSURE < 80 MM HG: ICD-10-PCS | Mod: CPTII,S$GLB,,

## 2022-12-21 PROCEDURE — 1159F MED LIST DOCD IN RCRD: CPT | Mod: CPTII,S$GLB,,

## 2022-12-21 PROCEDURE — 3078F DIAST BP <80 MM HG: CPT | Mod: CPTII,S$GLB,,

## 2022-12-21 PROCEDURE — 99213 PR OFFICE/OUTPT VISIT, EST, LEVL III, 20-29 MIN: ICD-10-PCS | Mod: S$GLB,,,

## 2022-12-21 PROCEDURE — 3008F BODY MASS INDEX DOCD: CPT | Mod: CPTII,S$GLB,,

## 2022-12-21 PROCEDURE — 3074F SYST BP LT 130 MM HG: CPT | Mod: CPTII,S$GLB,,

## 2022-12-21 PROCEDURE — 99213 OFFICE O/P EST LOW 20 MIN: CPT | Mod: S$GLB,,,

## 2022-12-21 PROCEDURE — 1159F PR MEDICATION LIST DOCUMENTED IN MEDICAL RECORD: ICD-10-PCS | Mod: CPTII,S$GLB,,
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
## 2022-12-22 NOTE — PATIENT INSTRUCTIONS
Wear the wrist braces especially at night for arthritic pain. Take tylenol for pain relief. Decrease activities that make pain worse for the next few days. Follow up with PCP for continued symptoms.

## 2022-12-27 ENCOUNTER — OFFICE VISIT (OUTPATIENT)
Dept: URGENT CARE | Facility: CLINIC | Age: 57
End: 2022-12-27
Payer: MEDICARE

## 2022-12-27 VITALS
HEIGHT: 64 IN | RESPIRATION RATE: 14 BRPM | HEART RATE: 90 BPM | BODY MASS INDEX: 27.31 KG/M2 | WEIGHT: 160 LBS | DIASTOLIC BLOOD PRESSURE: 80 MMHG | TEMPERATURE: 99 F | OXYGEN SATURATION: 96 % | SYSTOLIC BLOOD PRESSURE: 133 MMHG

## 2022-12-27 DIAGNOSIS — T14.90XA TRAUMA: Primary | ICD-10-CM

## 2022-12-27 PROCEDURE — 3075F PR MOST RECENT SYSTOLIC BLOOD PRESS GE 130-139MM HG: ICD-10-PCS | Mod: CPTII,S$GLB,, | Performed by: FAMILY MEDICINE

## 2022-12-27 PROCEDURE — 70100 X-RAY EXAM OF JAW <4VIEWS: CPT | Mod: FY,S$GLB,, | Performed by: RADIOLOGY

## 2022-12-27 PROCEDURE — 3008F PR BODY MASS INDEX (BMI) DOCUMENTED: ICD-10-PCS | Mod: CPTII,S$GLB,, | Performed by: FAMILY MEDICINE

## 2022-12-27 PROCEDURE — 73565 XR KNEE AP STANDING BILATERAL: ICD-10-PCS | Mod: FY,S$GLB,, | Performed by: RADIOLOGY

## 2022-12-27 PROCEDURE — 73565 X-RAY EXAM OF KNEES: CPT | Mod: FY,S$GLB,, | Performed by: RADIOLOGY

## 2022-12-27 PROCEDURE — 3008F BODY MASS INDEX DOCD: CPT | Mod: CPTII,S$GLB,, | Performed by: FAMILY MEDICINE

## 2022-12-27 PROCEDURE — 99214 OFFICE O/P EST MOD 30 MIN: CPT | Mod: S$GLB,,, | Performed by: FAMILY MEDICINE

## 2022-12-27 PROCEDURE — 70100 XR MANDIBLE LESS THAN 4 VIEWS: ICD-10-PCS | Mod: FY,S$GLB,, | Performed by: RADIOLOGY

## 2022-12-27 PROCEDURE — 3079F DIAST BP 80-89 MM HG: CPT | Mod: CPTII,S$GLB,, | Performed by: FAMILY MEDICINE

## 2022-12-27 PROCEDURE — 71100 X-RAY EXAM RIBS UNI 2 VIEWS: CPT | Mod: FY,RT,S$GLB, | Performed by: RADIOLOGY

## 2022-12-27 PROCEDURE — 3079F PR MOST RECENT DIASTOLIC BLOOD PRESSURE 80-89 MM HG: ICD-10-PCS | Mod: CPTII,S$GLB,, | Performed by: FAMILY MEDICINE

## 2022-12-27 PROCEDURE — 3075F SYST BP GE 130 - 139MM HG: CPT | Mod: CPTII,S$GLB,, | Performed by: FAMILY MEDICINE

## 2022-12-27 PROCEDURE — 71100 XR RIBS 2 VIEW RIGHT: ICD-10-PCS | Mod: FY,RT,S$GLB, | Performed by: RADIOLOGY

## 2022-12-27 PROCEDURE — 99214 PR OFFICE/OUTPT VISIT, EST, LEVL IV, 30-39 MIN: ICD-10-PCS | Mod: S$GLB,,, | Performed by: FAMILY MEDICINE

## 2022-12-27 RX ORDER — TRAMADOL HYDROCHLORIDE 50 MG/1
50 TABLET ORAL EVERY 6 HOURS
Qty: 21 TABLET | Refills: 0 | Status: SHIPPED | OUTPATIENT
Start: 2022-12-27
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
## 2023-02-06 ENCOUNTER — OFFICE VISIT (OUTPATIENT)
Dept: URGENT CARE | Facility: CLINIC | Age: 58
End: 2023-02-06
Payer: MEDICARE

## 2023-02-06 VITALS
RESPIRATION RATE: 18 BRPM | DIASTOLIC BLOOD PRESSURE: 78 MMHG | OXYGEN SATURATION: 98 % | WEIGHT: 160 LBS | HEART RATE: 84 BPM | BODY MASS INDEX: 27.31 KG/M2 | SYSTOLIC BLOOD PRESSURE: 111 MMHG | TEMPERATURE: 98 F | HEIGHT: 64 IN

## 2023-02-06 DIAGNOSIS — M79.641 PAIN OF RIGHT HAND: Primary | ICD-10-CM

## 2023-02-06 DIAGNOSIS — S63.501A SPRAIN OF RIGHT WRIST, INITIAL ENCOUNTER: ICD-10-CM

## 2023-02-06 DIAGNOSIS — M54.42 CHRONIC BILATERAL LOW BACK PAIN WITH LEFT-SIDED SCIATICA: ICD-10-CM

## 2023-02-06 DIAGNOSIS — M25.539 PAIN IN WRIST, UNSPECIFIED LATERALITY: ICD-10-CM

## 2023-02-06 DIAGNOSIS — M79.601 PAIN OF RIGHT UPPER EXTREMITY: ICD-10-CM

## 2023-02-06 DIAGNOSIS — G89.29 CHRONIC BILATERAL LOW BACK PAIN WITH LEFT-SIDED SCIATICA: ICD-10-CM

## 2023-02-06 PROCEDURE — 3074F PR MOST RECENT SYSTOLIC BLOOD PRESSURE < 130 MM HG: ICD-10-PCS | Mod: CPTII,S$GLB,, | Performed by: INTERNAL MEDICINE

## 2023-02-06 PROCEDURE — 73130 X-RAY EXAM OF HAND: CPT | Mod: FY,RT,S$GLB, | Performed by: RADIOLOGY

## 2023-02-06 PROCEDURE — 73110 X-RAY EXAM OF WRIST: CPT | Mod: FY,RT,S$GLB, | Performed by: RADIOLOGY

## 2023-02-06 PROCEDURE — 99214 OFFICE O/P EST MOD 30 MIN: CPT | Mod: S$GLB,,, | Performed by: INTERNAL MEDICINE

## 2023-02-06 PROCEDURE — 3074F SYST BP LT 130 MM HG: CPT | Mod: CPTII,S$GLB,, | Performed by: INTERNAL MEDICINE

## 2023-02-06 PROCEDURE — 1159F PR MEDICATION LIST DOCUMENTED IN MEDICAL RECORD: ICD-10-PCS | Mod: CPTII,S$GLB,, | Performed by: INTERNAL MEDICINE

## 2023-02-06 PROCEDURE — 73130 XR HAND COMPLETE 3 VIEW RIGHT: ICD-10-PCS | Mod: FY,RT,S$GLB, | Performed by: RADIOLOGY

## 2023-02-06 PROCEDURE — 99214 PR OFFICE/OUTPT VISIT, EST, LEVL IV, 30-39 MIN: ICD-10-PCS | Mod: S$GLB,,, | Performed by: INTERNAL MEDICINE

## 2023-02-06 PROCEDURE — 3078F DIAST BP <80 MM HG: CPT | Mod: CPTII,S$GLB,, | Performed by: INTERNAL MEDICINE

## 2023-02-06 PROCEDURE — 73110 XR WRIST COMPLETE 3 VIEWS RIGHT: ICD-10-PCS | Mod: FY,RT,S$GLB, | Performed by: RADIOLOGY

## 2023-02-06 PROCEDURE — 3008F PR BODY MASS INDEX (BMI) DOCUMENTED: ICD-10-PCS | Mod: CPTII,S$GLB,, | Performed by: INTERNAL MEDICINE

## 2023-02-06 PROCEDURE — 3078F PR MOST RECENT DIASTOLIC BLOOD PRESSURE < 80 MM HG: ICD-10-PCS | Mod: CPTII,S$GLB,, | Performed by: INTERNAL MEDICINE

## 2023-02-06 PROCEDURE — 3008F BODY MASS INDEX DOCD: CPT | Mod: CPTII,S$GLB,, | Performed by: INTERNAL MEDICINE

## 2023-02-06 PROCEDURE — 1159F MED LIST DOCD IN RCRD: CPT | Mod: CPTII,S$GLB,, | Performed by: INTERNAL MEDICINE

## 2023-02-07 NOTE — PATIENT INSTRUCTIONS
Wear brace for 1 week.  Use Tylenol as needed for pain.  Only use up to 3000 mg in 1 day.  Follow-up with your primary care doctor in 5-7 days.
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
## 2023-02-14 ENCOUNTER — OFFICE VISIT (OUTPATIENT)
Dept: URGENT CARE | Facility: CLINIC | Age: 58
End: 2023-02-14
Payer: MEDICARE

## 2023-02-14 VITALS
OXYGEN SATURATION: 95 % | HEIGHT: 64 IN | HEART RATE: 101 BPM | WEIGHT: 198.88 LBS | RESPIRATION RATE: 20 BRPM | TEMPERATURE: 98 F | DIASTOLIC BLOOD PRESSURE: 65 MMHG | SYSTOLIC BLOOD PRESSURE: 91 MMHG | BODY MASS INDEX: 33.95 KG/M2

## 2023-02-14 DIAGNOSIS — R53.83 FATIGUE, UNSPECIFIED TYPE: ICD-10-CM

## 2023-02-14 DIAGNOSIS — J40 BRONCHITIS: Primary | ICD-10-CM

## 2023-02-14 DIAGNOSIS — F17.200 NEEDS SMOKING CESSATION EDUCATION: ICD-10-CM

## 2023-02-14 LAB
CTP QC/QA: YES
CTP QC/QA: YES
POC MOLECULAR INFLUENZA A AGN: NEGATIVE
POC MOLECULAR INFLUENZA B AGN: NEGATIVE
SARS-COV-2 AG RESP QL IA.RAPID: NEGATIVE

## 2023-02-14 PROCEDURE — 94640 PR INHAL RX, AIRWAY OBST/DX SPUTUM INDUCT: ICD-10-PCS | Mod: S$GLB,,,

## 2023-02-14 PROCEDURE — 3008F BODY MASS INDEX DOCD: CPT | Mod: CPTII,S$GLB,,

## 2023-02-14 PROCEDURE — 3074F SYST BP LT 130 MM HG: CPT | Mod: CPTII,S$GLB,,

## 2023-02-14 PROCEDURE — 3078F DIAST BP <80 MM HG: CPT | Mod: CPTII,S$GLB,,

## 2023-02-14 PROCEDURE — 71046 XR CHEST PA AND LATERAL: ICD-10-PCS | Mod: FY,S$GLB,, | Performed by: RADIOLOGY

## 2023-02-14 PROCEDURE — 1159F MED LIST DOCD IN RCRD: CPT | Mod: CPTII,S$GLB,,

## 2023-02-14 PROCEDURE — 3074F PR MOST RECENT SYSTOLIC BLOOD PRESSURE < 130 MM HG: ICD-10-PCS | Mod: CPTII,S$GLB,,

## 2023-02-14 PROCEDURE — 94640 AIRWAY INHALATION TREATMENT: CPT | Mod: S$GLB,,,

## 2023-02-14 PROCEDURE — 87811 SARS CORONAVIRUS 2 ANTIGEN POCT, MANUAL READ: ICD-10-PCS | Mod: QW,S$GLB,,

## 2023-02-14 PROCEDURE — 87811 SARS-COV-2 COVID19 W/OPTIC: CPT | Mod: QW,S$GLB,,

## 2023-02-14 PROCEDURE — 71046 X-RAY EXAM CHEST 2 VIEWS: CPT | Mod: FY,S$GLB,, | Performed by: RADIOLOGY

## 2023-02-14 PROCEDURE — 3078F PR MOST RECENT DIASTOLIC BLOOD PRESSURE < 80 MM HG: ICD-10-PCS | Mod: CPTII,S$GLB,,

## 2023-02-14 PROCEDURE — 1160F RVW MEDS BY RX/DR IN RCRD: CPT | Mod: CPTII,S$GLB,,

## 2023-02-14 PROCEDURE — 87502 INFLUENZA DNA AMP PROBE: CPT | Mod: QW,S$GLB,,

## 2023-02-14 PROCEDURE — 3008F PR BODY MASS INDEX (BMI) DOCUMENTED: ICD-10-PCS | Mod: CPTII,S$GLB,,

## 2023-02-14 PROCEDURE — 99214 PR OFFICE/OUTPT VISIT, EST, LEVL IV, 30-39 MIN: ICD-10-PCS | Mod: 25,S$GLB,,

## 2023-02-14 PROCEDURE — 1159F PR MEDICATION LIST DOCUMENTED IN MEDICAL RECORD: ICD-10-PCS | Mod: CPTII,S$GLB,,

## 2023-02-14 PROCEDURE — 99214 OFFICE O/P EST MOD 30 MIN: CPT | Mod: 25,S$GLB,,

## 2023-02-14 PROCEDURE — 1160F PR REVIEW ALL MEDS BY PRESCRIBER/CLIN PHARMACIST DOCUMENTED: ICD-10-PCS | Mod: CPTII,S$GLB,,

## 2023-02-14 PROCEDURE — 87502 POCT INFLUENZA A/B MOLECULAR: ICD-10-PCS | Mod: QW,S$GLB,,

## 2023-02-14 RX ORDER — IPRATROPIUM BROMIDE 0.5 MG/2.5ML
0.5 SOLUTION RESPIRATORY (INHALATION)
Status: COMPLETED | OUTPATIENT
Start: 2023-02-14 | End: 2023-02-14

## 2023-02-14 RX ORDER — BENZONATATE 100 MG/1
100 CAPSULE ORAL 3 TIMES DAILY PRN
Qty: 30 CAPSULE | Refills: 0 | Status: SHIPPED | OUTPATIENT
Start: 2023-02-14 | End: 2023-02-15

## 2023-02-14 RX ORDER — BENZONATATE 200 MG/1
200 CAPSULE ORAL 3 TIMES DAILY PRN
COMMUNITY
Start: 2023-02-13 | End: 2023-06-15 | Stop reason: SDUPTHER

## 2023-02-14 RX ORDER — PREDNISONE 20 MG/1
40 TABLET ORAL DAILY
Qty: 8 TABLET | Refills: 0 | Status: SHIPPED | OUTPATIENT
Start: 2023-02-14 | End: 2023-02-18

## 2023-02-14 RX ORDER — ALBUTEROL SULFATE 90 UG/1
2 AEROSOL, METERED RESPIRATORY (INHALATION) EVERY 6 HOURS PRN
Qty: 18 G | Refills: 0 | Status: SHIPPED | OUTPATIENT
Start: 2023-02-14 | End: 2024-02-14

## 2023-02-14 RX ORDER — ALBUTEROL SULFATE 0.83 MG/ML
2.5 SOLUTION RESPIRATORY (INHALATION)
Status: COMPLETED | OUTPATIENT
Start: 2023-02-14 | End: 2023-02-14

## 2023-02-14 RX ADMIN — IPRATROPIUM BROMIDE 0.5 MG: 0.5 SOLUTION RESPIRATORY (INHALATION) at 07:02

## 2023-02-14 RX ADMIN — ALBUTEROL SULFATE 2.5 MG: 0.83 SOLUTION RESPIRATORY (INHALATION) at 07:02

## 2023-02-15 ENCOUNTER — LAB VISIT (OUTPATIENT)
Dept: LAB | Facility: HOSPITAL | Age: 58
End: 2023-02-15
Attending: INTERNAL MEDICINE
Payer: MEDICARE

## 2023-02-15 ENCOUNTER — OFFICE VISIT (OUTPATIENT)
Dept: PRIMARY CARE CLINIC | Facility: CLINIC | Age: 58
End: 2023-02-15
Payer: MEDICARE

## 2023-02-15 VITALS
WEIGHT: 205.69 LBS | HEIGHT: 64 IN | BODY MASS INDEX: 35.12 KG/M2 | HEART RATE: 83 BPM | SYSTOLIC BLOOD PRESSURE: 102 MMHG | OXYGEN SATURATION: 96 % | DIASTOLIC BLOOD PRESSURE: 66 MMHG

## 2023-02-15 DIAGNOSIS — E78.5 HYPERLIPIDEMIA, UNSPECIFIED HYPERLIPIDEMIA TYPE: ICD-10-CM

## 2023-02-15 DIAGNOSIS — E78.2 MIXED HYPERLIPIDEMIA: ICD-10-CM

## 2023-02-15 DIAGNOSIS — Z88.6 ALLERGY TO NSAIDS: ICD-10-CM

## 2023-02-15 DIAGNOSIS — E55.9 VITAMIN D DEFICIENCY: ICD-10-CM

## 2023-02-15 DIAGNOSIS — G20.A1 PARKINSON DISEASE: ICD-10-CM

## 2023-02-15 DIAGNOSIS — F17.210 NICOTINE DEPENDENCE, CIGARETTES, UNCOMPLICATED: ICD-10-CM

## 2023-02-15 DIAGNOSIS — Z12.31 ENCOUNTER FOR SCREENING MAMMOGRAM FOR MALIGNANT NEOPLASM OF BREAST: Primary | ICD-10-CM

## 2023-02-15 DIAGNOSIS — E03.9 HYPOTHYROIDISM, UNSPECIFIED TYPE: ICD-10-CM

## 2023-02-15 DIAGNOSIS — Z72.0 TOBACCO USE: ICD-10-CM

## 2023-02-15 DIAGNOSIS — Z12.2 ENCOUNTER FOR SCREENING FOR MALIGNANT NEOPLASM OF LUNG: ICD-10-CM

## 2023-02-15 DIAGNOSIS — Z00.00 PREVENTATIVE HEALTH CARE: ICD-10-CM

## 2023-02-15 DIAGNOSIS — Z12.11 SCREENING FOR MALIGNANT NEOPLASM OF COLON: ICD-10-CM

## 2023-02-15 DIAGNOSIS — Z51.81 THERAPEUTIC DRUG MONITORING: ICD-10-CM

## 2023-02-15 DIAGNOSIS — J42 CHRONIC BRONCHITIS, UNSPECIFIED CHRONIC BRONCHITIS TYPE: ICD-10-CM

## 2023-02-15 DIAGNOSIS — F31.9 BIPOLAR 1 DISORDER: ICD-10-CM

## 2023-02-15 DIAGNOSIS — Z78.0 POSTMENOPAUSAL: ICD-10-CM

## 2023-02-15 DIAGNOSIS — Z01.419 ROUTINE GYNECOLOGICAL EXAMINATION: ICD-10-CM

## 2023-02-15 DIAGNOSIS — R73.9 HYPERGLYCEMIA: ICD-10-CM

## 2023-02-15 PROBLEM — T14.90XA TRAUMA: Status: RESOLVED | Noted: 2022-12-27 | Resolved: 2023-02-15

## 2023-02-15 LAB
ALBUMIN SERPL BCP-MCNC: 3.7 G/DL (ref 3.5–5.2)
ALP SERPL-CCNC: 61 U/L (ref 55–135)
ALT SERPL W/O P-5'-P-CCNC: 9 U/L (ref 10–44)
ANION GAP SERPL CALC-SCNC: 14 MMOL/L (ref 8–16)
AST SERPL-CCNC: 22 U/L (ref 10–40)
BASOPHILS # BLD AUTO: 0.03 K/UL (ref 0–0.2)
BASOPHILS NFR BLD: 0.6 % (ref 0–1.9)
BILIRUB SERPL-MCNC: 0.3 MG/DL (ref 0.1–1)
BUN SERPL-MCNC: 17 MG/DL (ref 6–20)
CALCIUM SERPL-MCNC: 9.3 MG/DL (ref 8.7–10.5)
CHLORIDE SERPL-SCNC: 105 MMOL/L (ref 95–110)
CHOLEST SERPL-MCNC: 169 MG/DL (ref 120–199)
CHOLEST/HDLC SERPL: 3.1 {RATIO} (ref 2–5)
CO2 SERPL-SCNC: 25 MMOL/L (ref 23–29)
CREAT SERPL-MCNC: 0.8 MG/DL (ref 0.5–1.4)
DIFFERENTIAL METHOD: ABNORMAL
EOSINOPHIL # BLD AUTO: 0.1 K/UL (ref 0–0.5)
EOSINOPHIL NFR BLD: 2.3 % (ref 0–8)
ERYTHROCYTE [DISTWIDTH] IN BLOOD BY AUTOMATED COUNT: 14.2 % (ref 11.5–14.5)
EST. GFR  (NO RACE VARIABLE): >60 ML/MIN/1.73 M^2
GLUCOSE SERPL-MCNC: 86 MG/DL (ref 70–110)
HCT VFR BLD AUTO: 42.4 % (ref 37–48.5)
HDLC SERPL-MCNC: 55 MG/DL (ref 40–75)
HDLC SERPL: 32.5 % (ref 20–50)
HGB BLD-MCNC: 13.3 G/DL (ref 12–16)
IMM GRANULOCYTES # BLD AUTO: 0.04 K/UL (ref 0–0.04)
IMM GRANULOCYTES NFR BLD AUTO: 0.8 % (ref 0–0.5)
LDLC SERPL CALC-MCNC: 100 MG/DL (ref 63–159)
LYMPHOCYTES # BLD AUTO: 1.6 K/UL (ref 1–4.8)
LYMPHOCYTES NFR BLD: 31 % (ref 18–48)
MCH RBC QN AUTO: 32 PG (ref 27–31)
MCHC RBC AUTO-ENTMCNC: 31.4 G/DL (ref 32–36)
MCV RBC AUTO: 102 FL (ref 82–98)
MONOCYTES # BLD AUTO: 0.7 K/UL (ref 0.3–1)
MONOCYTES NFR BLD: 12.5 % (ref 4–15)
NEUTROPHILS # BLD AUTO: 2.8 K/UL (ref 1.8–7.7)
NEUTROPHILS NFR BLD: 52.8 % (ref 38–73)
NONHDLC SERPL-MCNC: 114 MG/DL
NRBC BLD-RTO: 0 /100 WBC
PLATELET # BLD AUTO: 165 K/UL (ref 150–450)
PMV BLD AUTO: 10.6 FL (ref 9.2–12.9)
POTASSIUM SERPL-SCNC: 4.2 MMOL/L (ref 3.5–5.1)
PROT SERPL-MCNC: 6.5 G/DL (ref 6–8.4)
RBC # BLD AUTO: 4.15 M/UL (ref 4–5.4)
SODIUM SERPL-SCNC: 144 MMOL/L (ref 136–145)
T4 FREE SERPL-MCNC: 0.99 NG/DL (ref 0.71–1.51)
TRIGL SERPL-MCNC: 70 MG/DL (ref 30–150)
TSH SERPL DL<=0.005 MIU/L-ACNC: 1.29 UIU/ML (ref 0.4–4)
VALPROATE SERPL-MCNC: 49.7 UG/ML (ref 50–100)
WBC # BLD AUTO: 5.26 K/UL (ref 3.9–12.7)

## 2023-02-15 PROCEDURE — 80164 ASSAY DIPROPYLACETIC ACD TOT: CPT | Performed by: INTERNAL MEDICINE

## 2023-02-15 PROCEDURE — 1159F PR MEDICATION LIST DOCUMENTED IN MEDICAL RECORD: ICD-10-PCS | Mod: CPTII,S$GLB,, | Performed by: INTERNAL MEDICINE

## 2023-02-15 PROCEDURE — 80053 COMPREHEN METABOLIC PANEL: CPT | Performed by: INTERNAL MEDICINE

## 2023-02-15 PROCEDURE — 3074F PR MOST RECENT SYSTOLIC BLOOD PRESSURE < 130 MM HG: ICD-10-PCS | Mod: CPTII,S$GLB,, | Performed by: INTERNAL MEDICINE

## 2023-02-15 PROCEDURE — 3008F BODY MASS INDEX DOCD: CPT | Mod: CPTII,S$GLB,, | Performed by: INTERNAL MEDICINE

## 2023-02-15 PROCEDURE — 85025 COMPLETE CBC W/AUTO DIFF WBC: CPT | Performed by: INTERNAL MEDICINE

## 2023-02-15 PROCEDURE — 84443 ASSAY THYROID STIM HORMONE: CPT | Performed by: INTERNAL MEDICINE

## 2023-02-15 PROCEDURE — 82306 VITAMIN D 25 HYDROXY: CPT | Performed by: INTERNAL MEDICINE

## 2023-02-15 PROCEDURE — 86803 HEPATITIS C AB TEST: CPT | Performed by: INTERNAL MEDICINE

## 2023-02-15 PROCEDURE — 3078F PR MOST RECENT DIASTOLIC BLOOD PRESSURE < 80 MM HG: ICD-10-PCS | Mod: CPTII,S$GLB,, | Performed by: INTERNAL MEDICINE

## 2023-02-15 PROCEDURE — 3078F DIAST BP <80 MM HG: CPT | Mod: CPTII,S$GLB,, | Performed by: INTERNAL MEDICINE

## 2023-02-15 PROCEDURE — 83036 HEMOGLOBIN GLYCOSYLATED A1C: CPT | Performed by: INTERNAL MEDICINE

## 2023-02-15 PROCEDURE — 3074F SYST BP LT 130 MM HG: CPT | Mod: CPTII,S$GLB,, | Performed by: INTERNAL MEDICINE

## 2023-02-15 PROCEDURE — 3008F PR BODY MASS INDEX (BMI) DOCUMENTED: ICD-10-PCS | Mod: CPTII,S$GLB,, | Performed by: INTERNAL MEDICINE

## 2023-02-15 PROCEDURE — 99386 PREV VISIT NEW AGE 40-64: CPT | Mod: GZ,S$GLB,, | Performed by: INTERNAL MEDICINE

## 2023-02-15 PROCEDURE — 99999 PR PBB SHADOW E&M-EST. PATIENT-LVL V: CPT | Mod: PBBFAC,,, | Performed by: INTERNAL MEDICINE

## 2023-02-15 PROCEDURE — 36415 COLL VENOUS BLD VENIPUNCTURE: CPT | Mod: PN | Performed by: INTERNAL MEDICINE

## 2023-02-15 PROCEDURE — 80061 LIPID PANEL: CPT | Performed by: INTERNAL MEDICINE

## 2023-02-15 PROCEDURE — 1159F MED LIST DOCD IN RCRD: CPT | Mod: CPTII,S$GLB,, | Performed by: INTERNAL MEDICINE

## 2023-02-15 PROCEDURE — 87389 HIV-1 AG W/HIV-1&-2 AB AG IA: CPT | Performed by: INTERNAL MEDICINE

## 2023-02-15 PROCEDURE — 84439 ASSAY OF FREE THYROXINE: CPT | Performed by: INTERNAL MEDICINE

## 2023-02-15 PROCEDURE — 99386 PR PREVENTIVE VISIT,NEW,40-64: ICD-10-PCS | Mod: GZ,S$GLB,, | Performed by: INTERNAL MEDICINE

## 2023-02-15 PROCEDURE — 99999 PR PBB SHADOW E&M-EST. PATIENT-LVL V: ICD-10-PCS | Mod: PBBFAC,,, | Performed by: INTERNAL MEDICINE

## 2023-02-15 RX ORDER — ROSUVASTATIN CALCIUM 10 MG/1
10 TABLET, COATED ORAL DAILY
Qty: 90 TABLET | Refills: 3 | Status: SHIPPED | OUTPATIENT
Start: 2023-02-15 | End: 2024-02-10

## 2023-02-15 RX ORDER — TIZANIDINE 4 MG/1
4 TABLET ORAL EVERY 8 HOURS
Qty: 90 TABLET | Refills: 3
Start: 2023-02-15 | End: 2023-02-15

## 2023-02-15 RX ORDER — IBUPROFEN 200 MG
1 TABLET ORAL DAILY
Qty: 38 PATCH | Refills: 3 | Status: SHIPPED | OUTPATIENT
Start: 2023-02-15 | End: 2024-02-15

## 2023-02-15 RX ORDER — LEVOTHYROXINE SODIUM 75 UG/1
75 TABLET ORAL
Qty: 90 TABLET | Refills: 3 | Status: SHIPPED | OUTPATIENT
Start: 2023-02-15 | End: 2023-05-09 | Stop reason: SDUPTHER

## 2023-02-15 NOTE — ASSESSMENT & PLAN NOTE
-need labs today including Vit D and valproic level   -schedule MMG, DEXA at [facility], refer to GYN for pap smear  -refer for colon with Dr. Boyd   -get FLU and COVID   -Make dental  appt  -schedule LDCT at [facility]
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
## 2023-02-15 NOTE — ASSESSMENT & PLAN NOTE
-cont all psych meds prescribed by Dr. Emma Davis - keep appt on March 15, 2023 at 1:15pm   -will check Valproic acid level

## 2023-02-15 NOTE — PATIENT INSTRUCTIONS
cont all psych meds prescribed by Dr. Emma Davis - keep appt on March 15, 2023 at 1:15pm      -need labs today including Vit D and valproic level   -schedule [facility], [facility] refer to GYN for pap smear at [address] with [address]. Delong  -refer for colon with Dr. Boyd   -refer for LDCT at [facility]  -get FLU and COVID   -Make dental appts

## 2023-02-15 NOTE — PATIENT INSTRUCTIONS
PLEASE READ YOUR DISCHARGE INSTRUCTIONS ENTIRELY AS IT CONTAINS IMPORTANT INFORMATION.    Please take your steroids to completion.     Use the albuterol inhaler for wheezing.     Do not drive while taking the cough syrup - best to take it at night before going to sleep. However, you can take it during the day (every 4-6 hours) if you do not have to drive or operate machinery. This medication will make you drowsy. Try taking half a dose first to see how it affects you.      Please return or see your primary care doctor if you develop new or worsening symptoms.     Please arrange follow up with your primary medical clinic as soon as possible. You must understand that you've received an Urgent Care treatment only and that you may be released before all of your medical problems are known or treated. You, the patient, will arrange for follow up as instructed. If your symptoms worsen or fail to improve you should go to the Emergency Room.

## 2023-02-15 NOTE — PROGRESS NOTES
"Subjective:       Patient ID: Emma Morales is a 57 y.o. female.    Vitals:  height is 5' 4" (1.626 m) and weight is 90.2 kg (198 lb 13.7 oz). Her temperature is 98.2 °F (36.8 °C). Her blood pressure is 91/65 and her pulse is 101. Her respiration is 20 and oxygen saturation is 95%.     Chief Complaint: Fatigue    This is a 57 y.o. female who presents today with a chief complaint of fatigue, sore throat, runny nose, headaches, cough, and chest congestion x 3 days ago. Pt reports that she is having some blood tinge when she blows her nose.      Fatigue  This is a new problem. The current episode started in the past 7 days. The problem has been gradually worsening. Associated symptoms include congestion, fatigue, headaches and a sore throat. Pertinent negatives include no chest pain, fever, nausea or vomiting. Associated symptoms comments: Runny nose . She has tried nothing for the symptoms.     Constitution: Positive for fatigue. Negative for fever.   HENT:  Positive for congestion and sore throat.    Cardiovascular:  Negative for chest pain.   Gastrointestinal:  Negative for nausea, vomiting and diarrhea.   Neurological:  Positive for headaches. Negative for dizziness and light-headedness.     Objective:      Physical Exam   Constitutional: She is oriented to person, place, and time. She appears well-developed. She is cooperative.  Non-toxic appearance. She does not appear ill. No distress.      Comments:Patient sitting in chair with no signs of distress. Patient able to complete sentences without pausing.       HENT:   Head: Normocephalic and atraumatic.   Ears:   Right Ear: Hearing, tympanic membrane, external ear and ear canal normal.   Left Ear: Hearing, tympanic membrane, external ear and ear canal normal.   Nose: Congestion present. No mucosal edema, rhinorrhea or nasal deformity. No epistaxis. Right sinus exhibits no maxillary sinus tenderness and no frontal sinus tenderness. Left sinus exhibits no maxillary " sinus tenderness and no frontal sinus tenderness.   Mouth/Throat: Uvula is midline, oropharynx is clear and moist and mucous membranes are normal. No trismus in the jaw. Normal dentition. No uvula swelling. No oropharyngeal exudate, posterior oropharyngeal edema or posterior oropharyngeal erythema.   Eyes: Conjunctivae and lids are normal. No scleral icterus.   Neck: Trachea normal and phonation normal. Neck supple. No edema present. No erythema present. No neck rigidity present.   Cardiovascular: Normal rate, regular rhythm, normal heart sounds and normal pulses.   Pulmonary/Chest: Effort normal. No respiratory distress. She has no decreased breath sounds. She has wheezes (expiratory wheezing throughout all lung fields). She has no rhonchi.   Abdominal: Normal appearance.   Musculoskeletal: Normal range of motion.         General: No deformity. Normal range of motion.   Neurological: She is alert and oriented to person, place, and time. She exhibits normal muscle tone. Coordination normal.   Skin: Skin is warm, dry, intact, not diaphoretic and not pale.   Psychiatric: Her speech is normal and behavior is normal. Judgment and thought content normal.   Nursing note and vitals reviewed.    XR CHEST PA AND LATERAL    Result Date: 2/14/2023  EXAMINATION: CHEST PA AND LATERAL CLINICAL HISTORY: Wheezing TECHNIQUE: PA and lateral chest radiograph COMPARISON: 12/27/2022 FINDINGS: The cardiac silhouette is within normal limits.   There is no focal consolidation, pneumothorax, or pleural effusion.  Mild levoscoliosis is present.     No acute intrathoracic abnormality. Electronically signed by: Michelle Patel Date:    02/14/2023 Time:    19:31      Patient in no acute distress.  Vitals reassuring.  Discussed results/diagnosis/plan in depth with patient in clinic. Strict precautions given to patient to monitor for worsening signs and symptoms. Advised to follow up with primary.All questions answered. Strict ER precautions  given. If your symptoms worsens or fail to improve you should go to the Emergency Room. Discharge and follow-up instructions given verbally/printed. Discharge and follow-up instructions discussed with the patient who expressed understanding and willingness to comply with my recommendations.Patient voiced understanding and in agreement with current treatment plan.     Please be advised this text was dictated with [employer] software and may contain errors due to translation.    Assessment:       1. Fatigue, unspecified type    2. Wheezing        Plan:         Bronchitis  -     POCT Influenza A/B MOLECULAR  -     XR CHEST PA AND LATERAL; Future; Expected date: 02/14/2023  -     albuterol nebulizer solution 2.5 mg  -     ipratropium 0.02 % nebulizer solution 0.5 mg  -     benzonatate (TESSALON) 100 MG capsule; Take 1 capsule (100 mg total) by mouth 3 (three) times daily as needed for Cough.  Dispense: 30 capsule; Refill: 0  -     predniSONE (DELTASONE) 20 MG tablet; Take 2 tablets (40 mg total) by mouth once daily. for 4 days  Dispense: 8 tablet; Refill: 0  -     albuterol (VENTOLIN HFA) 90 mcg/actuation inhaler; Inhale 2 puffs into the lungs every 6 (six) hours as needed for Wheezing. Rescue  Dispense: 18 g; Refill: 0    Fatigue, unspecified type  -     SARS Coronavirus 2 Antigen, POCT Manual Read    Pt in no acute distress. Reviewed negative COVID-19 and influenza test results with pt. Reviewed negative chest x ray results with pt. Duoneb administered for wheezing, improvement in lung sounds. Lung sounds clear after Duoneb. Sats increased to 95%. Will treat bronchitis with short course of oral prednisone (pt reports she tolerates prednisone well), tessalon Perles, and albuterol. Discussed the importance of further evaluation if symptoms worsen. Patient stated verbal understanding.       Patient Instructions   PLEASE READ YOUR DISCHARGE INSTRUCTIONS ENTIRELY AS IT CONTAINS IMPORTANT INFORMATION.    Please take your  steroids to completion.     Use the albuterol inhaler for wheezing.     Do not drive while taking the cough syrup - best to take it at night before going to sleep. However, you can take it during the day (every 4-6 hours) if you do not have to drive or operate machinery. This medication will make you drowsy. Try taking half a dose first to see how it affects you.      Please return or see your primary care doctor if you develop new or worsening symptoms.     Please arrange follow up with your primary medical clinic as soon as possible. You must understand that you've received an Urgent Care treatment only and that you may be released before all of your medical problems are known or treated. You, the patient, will arrange for follow up as instructed. If your symptoms worsen or fail to improve you should go to the Emergency Room.

## 2023-02-16 LAB
25(OH)D3+25(OH)D2 SERPL-MCNC: 55 NG/ML (ref 30–96)
ESTIMATED AVG GLUCOSE: 105 MG/DL (ref 68–131)
HBA1C MFR BLD: 5.3 % (ref 4–5.6)
HCV AB SERPL QL IA: NORMAL
HIV 1+2 AB+HIV1 P24 AG SERPL QL IA: NORMAL

## 2023-02-16 NOTE — ASSESSMENT & PLAN NOTE
F/u Pulm Dr. Peña at [facility]  -cont Bevespi and proair prn  -cont tessalon perles  -stop smoking - start Nicoderm 21mg patch   -get LDCT for lung cancer screening  -get DEXA

## 2023-02-16 NOTE — PROGRESS NOTES
Patient, Emma Morales (MRN #[id number]), presented with a recorded BMI of 35.31 kg/m^2 and a documented comorbidity(s):  - Hyperlipidemia  to which the severe obesity is a contributing factor. This is consistent with the definition of severe obesity (BMI 35.0-39.9) with comorbidity (ICD-10 E66.01, Z68.35). The patient's severe obesity was monitored, evaluated, addressed and/or treated. This addendum to the medical record is made on 02/16/2023.

## 2023-03-15 ENCOUNTER — TELEPHONE (OUTPATIENT)
Dept: PRIMARY CARE CLINIC | Facility: CLINIC | Age: 58
End: 2023-03-15
Payer: MEDICARE

## 2023-03-15 NOTE — TELEPHONE ENCOUNTER
----- Message from Mally Macdonald sent at 3/15/2023 12:23 PM CDT -----  Regarding: pt advice/ call back  Contact: pt  Type:  Needs Medical Advice    Who Called: Pt  Would the patient rather a call back or a response via [facility]?   Best Call Back Number: [phone]  Additional Information: pt would like to speak w/ you regarding back pain, please call to advise

## 2023-03-15 NOTE — TELEPHONE ENCOUNTER
Spoke with pt, offered pt an appt re: back pain. Pt declined.     States shes always had this back pain and Dr. Roe is aware. She would like a medication called in for her back

## 2023-03-17 DIAGNOSIS — G89.29 CHRONIC BILATERAL BACK PAIN, UNSPECIFIED BACK LOCATION: Primary | ICD-10-CM

## 2023-03-17 DIAGNOSIS — M54.9 CHRONIC BILATERAL BACK PAIN, UNSPECIFIED BACK LOCATION: Primary | ICD-10-CM

## 2023-03-17 RX ORDER — METHOCARBAMOL 500 MG/1
500 TABLET, FILM COATED ORAL 3 TIMES DAILY
Qty: 30 TABLET | Refills: 3 | Status: SHIPPED | OUTPATIENT
Start: 2023-03-17 | End: 2023-03-27

## 2023-03-24 ENCOUNTER — HOSPITAL ENCOUNTER (EMERGENCY)
Facility: HOSPITAL | Age: 58
Discharge: PSYCHIATRIC OR SUBSTANCE USE FACILITY | End: 2023-03-25
Attending: EMERGENCY MEDICINE
Payer: MEDICARE

## 2023-03-24 DIAGNOSIS — Z00.8 MEDICAL CLEARANCE FOR PSYCHIATRIC ADMISSION: ICD-10-CM

## 2023-03-24 DIAGNOSIS — F23 ACUTE PSYCHOSIS: Primary | ICD-10-CM

## 2023-03-24 PROCEDURE — 99285 PR EMERGENCY DEPT VISIT,LEVEL V: ICD-10-PCS | Mod: ,,, | Performed by: EMERGENCY MEDICINE

## 2023-03-24 PROCEDURE — 99285 EMERGENCY DEPT VISIT HI MDM: CPT

## 2023-03-24 PROCEDURE — 25000003 PHARM REV CODE 250: Performed by: EMERGENCY MEDICINE

## 2023-03-24 PROCEDURE — 99285 EMERGENCY DEPT VISIT HI MDM: CPT | Mod: ,,, | Performed by: EMERGENCY MEDICINE

## 2023-03-24 RX ORDER — DIPHENHYDRAMINE HYDROCHLORIDE 50 MG/ML
50 INJECTION INTRAMUSCULAR; INTRAVENOUS
Status: DISCONTINUED | OUTPATIENT
Start: 2023-03-24 | End: 2023-03-25

## 2023-03-24 RX ORDER — CLONAZEPAM 0.5 MG/1
0.5 TABLET ORAL
Status: COMPLETED | OUTPATIENT
Start: 2023-03-24 | End: 2023-03-24

## 2023-03-24 RX ORDER — LORAZEPAM 2 MG/ML
2 INJECTION INTRAMUSCULAR
Status: DISCONTINUED | OUTPATIENT
Start: 2023-03-24 | End: 2023-03-25

## 2023-03-24 RX ORDER — HALOPERIDOL 5 MG/ML
5 INJECTION INTRAMUSCULAR
Status: DISCONTINUED | OUTPATIENT
Start: 2023-03-24 | End: 2023-03-25

## 2023-03-24 RX ADMIN — CLONAZEPAM 0.5 MG: 0.5 TABLET ORAL at 11:03

## 2023-03-25 ENCOUNTER — PATIENT MESSAGE (OUTPATIENT)
Dept: ADMINISTRATIVE | Facility: HOSPITAL | Age: 58
End: 2023-03-25
Payer: MEDICARE

## 2023-03-25 VITALS
WEIGHT: 205 LBS | OXYGEN SATURATION: 97 % | DIASTOLIC BLOOD PRESSURE: 81 MMHG | HEART RATE: 70 BPM | BODY MASS INDEX: 35.19 KG/M2 | RESPIRATION RATE: 16 BRPM | SYSTOLIC BLOOD PRESSURE: 145 MMHG | TEMPERATURE: 98 F

## 2023-03-25 LAB
ALBUMIN SERPL BCP-MCNC: 4.1 G/DL (ref 3.5–5.2)
ALP SERPL-CCNC: 62 U/L (ref 55–135)
ALT SERPL W/O P-5'-P-CCNC: 12 U/L (ref 10–44)
AMPHET+METHAMPHET UR QL: ABNORMAL
ANION GAP SERPL CALC-SCNC: 11 MMOL/L (ref 8–16)
APAP SERPL-MCNC: <3 UG/ML (ref 10–20)
AST SERPL-CCNC: 33 U/L (ref 10–40)
BARBITURATES UR QL SCN>200 NG/ML: NEGATIVE
BASOPHILS # BLD AUTO: 0.03 K/UL (ref 0–0.2)
BASOPHILS NFR BLD: 0.3 % (ref 0–1.9)
BENZODIAZ UR QL SCN>200 NG/ML: NEGATIVE
BILIRUB SERPL-MCNC: 0.4 MG/DL (ref 0.1–1)
BILIRUB UR QL STRIP: NEGATIVE
BUN SERPL-MCNC: 10 MG/DL (ref 6–20)
BZE UR QL SCN: NEGATIVE
CALCIUM SERPL-MCNC: 9.3 MG/DL (ref 8.7–10.5)
CANNABINOIDS UR QL SCN: NEGATIVE
CHLORIDE SERPL-SCNC: 96 MMOL/L (ref 95–110)
CLARITY UR REFRACT.AUTO: CLEAR
CO2 SERPL-SCNC: 24 MMOL/L (ref 23–29)
COLOR UR AUTO: COLORLESS
CREAT SERPL-MCNC: 0.8 MG/DL (ref 0.5–1.4)
CREAT UR-MCNC: 10 MG/DL (ref 15–325)
DIFFERENTIAL METHOD: ABNORMAL
EOSINOPHIL # BLD AUTO: 0.1 K/UL (ref 0–0.5)
EOSINOPHIL NFR BLD: 1 % (ref 0–8)
ERYTHROCYTE [DISTWIDTH] IN BLOOD BY AUTOMATED COUNT: 13.4 % (ref 11.5–14.5)
EST. GFR  (NO RACE VARIABLE): >60 ML/MIN/1.73 M^2
ETHANOL SERPL-MCNC: <10 MG/DL
GLUCOSE SERPL-MCNC: 98 MG/DL (ref 70–110)
GLUCOSE UR QL STRIP: NEGATIVE
HCT VFR BLD AUTO: 41.4 % (ref 37–48.5)
HGB BLD-MCNC: 13.4 G/DL (ref 12–16)
HGB UR QL STRIP: NEGATIVE
IMM GRANULOCYTES # BLD AUTO: 0.02 K/UL (ref 0–0.04)
IMM GRANULOCYTES NFR BLD AUTO: 0.2 % (ref 0–0.5)
KETONES UR QL STRIP: NEGATIVE
LEUKOCYTE ESTERASE UR QL STRIP: NEGATIVE
LYMPHOCYTES # BLD AUTO: 3.7 K/UL (ref 1–4.8)
LYMPHOCYTES NFR BLD: 37.4 % (ref 18–48)
MCH RBC QN AUTO: 31.9 PG (ref 27–31)
MCHC RBC AUTO-ENTMCNC: 32.4 G/DL (ref 32–36)
MCV RBC AUTO: 99 FL (ref 82–98)
METHADONE UR QL SCN>300 NG/ML: NEGATIVE
MONOCYTES # BLD AUTO: 0.9 K/UL (ref 0.3–1)
MONOCYTES NFR BLD: 9.1 % (ref 4–15)
NEUTROPHILS # BLD AUTO: 5.2 K/UL (ref 1.8–7.7)
NEUTROPHILS NFR BLD: 52 % (ref 38–73)
NITRITE UR QL STRIP: NEGATIVE
NRBC BLD-RTO: 0 /100 WBC
OPIATES UR QL SCN: NEGATIVE
PCP UR QL SCN>25 NG/ML: NEGATIVE
PH UR STRIP: 6 [PH] (ref 5–8)
PLATELET # BLD AUTO: 173 K/UL (ref 150–450)
PMV BLD AUTO: 10.2 FL (ref 9.2–12.9)
POTASSIUM SERPL-SCNC: 4 MMOL/L (ref 3.5–5.1)
PROT SERPL-MCNC: 6.6 G/DL (ref 6–8.4)
PROT UR QL STRIP: NEGATIVE
RBC # BLD AUTO: 4.2 M/UL (ref 4–5.4)
SODIUM SERPL-SCNC: 131 MMOL/L (ref 136–145)
SP GR UR STRIP: 1 (ref 1–1.03)
TOXICOLOGY INFORMATION: ABNORMAL
TSH SERPL DL<=0.005 MIU/L-ACNC: 1.01 UIU/ML (ref 0.4–4)
URN SPEC COLLECT METH UR: ABNORMAL
WBC # BLD AUTO: 9.96 K/UL (ref 3.9–12.7)

## 2023-03-25 PROCEDURE — 84443 ASSAY THYROID STIM HORMONE: CPT | Performed by: EMERGENCY MEDICINE

## 2023-03-25 PROCEDURE — 80143 DRUG ASSAY ACETAMINOPHEN: CPT | Performed by: EMERGENCY MEDICINE

## 2023-03-25 PROCEDURE — 25000003 PHARM REV CODE 250: Performed by: EMERGENCY MEDICINE

## 2023-03-25 PROCEDURE — 85025 COMPLETE CBC W/AUTO DIFF WBC: CPT | Performed by: EMERGENCY MEDICINE

## 2023-03-25 PROCEDURE — 81003 URINALYSIS AUTO W/O SCOPE: CPT | Performed by: EMERGENCY MEDICINE

## 2023-03-25 PROCEDURE — 80053 COMPREHEN METABOLIC PANEL: CPT | Performed by: EMERGENCY MEDICINE

## 2023-03-25 PROCEDURE — 80307 DRUG TEST PRSMV CHEM ANLYZR: CPT | Performed by: EMERGENCY MEDICINE

## 2023-03-25 PROCEDURE — 82077 ASSAY SPEC XCP UR&BREATH IA: CPT | Performed by: EMERGENCY MEDICINE

## 2023-03-25 RX ORDER — LORAZEPAM 1 MG/1
2 TABLET ORAL EVERY 4 HOURS PRN
Status: DISCONTINUED | OUTPATIENT
Start: 2023-03-25 | End: 2023-03-25 | Stop reason: HOSPADM

## 2023-03-25 RX ORDER — DIPHENHYDRAMINE HCL 50 MG
50 CAPSULE ORAL EVERY 4 HOURS PRN
Status: DISCONTINUED | OUTPATIENT
Start: 2023-03-25 | End: 2023-03-25 | Stop reason: HOSPADM

## 2023-03-25 RX ORDER — DIPHENHYDRAMINE HCL 50 MG
50 CAPSULE ORAL ONCE
Status: COMPLETED | OUTPATIENT
Start: 2023-03-25 | End: 2023-03-25

## 2023-03-25 RX ORDER — HALOPERIDOL 5 MG/1
5 TABLET ORAL EVERY 6 HOURS PRN
Status: DISCONTINUED | OUTPATIENT
Start: 2023-03-25 | End: 2023-03-25 | Stop reason: HOSPADM

## 2023-03-25 RX ORDER — HALOPERIDOL 5 MG/1
5 TABLET ORAL
Status: COMPLETED | OUTPATIENT
Start: 2023-03-25 | End: 2023-03-25

## 2023-03-25 RX ADMIN — HALOPERIDOL 5 MG: 5 TABLET ORAL at 12:03

## 2023-03-25 RX ADMIN — HALOPERIDOL 5 MG: 5 TABLET ORAL at 07:03

## 2023-03-25 RX ADMIN — DIPHENHYDRAMINE HYDROCHLORIDE 50 MG: 50 CAPSULE ORAL at 12:03

## 2023-03-25 NOTE — PROVIDER PROGRESS NOTES - EMERGENCY DEPT.
Encounter Date: 3/24/2023    ED Physician Progress Notes          ED staff SIGN OUT NOTE    Patient s/o to me by Dr. Trejo pending transfer to psychiatric care facility    Vitals:    03/25/23 0710   BP: (!) 145/81   Pulse: 70   Resp: 16   Temp: 97.8 °F (36.6 °C)     Patient resting quietly in bed on my re-evaluation.     I was not alerted of any changes in patient condition during the duration of my care for this patient.
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
## 2023-03-25 NOTE — ED NOTES
Purse including paperwork, business cards, no valuables stored in closet.   Shoes, pants, shirt, bra also stored in closet.
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
## 2023-03-25 NOTE — ED NOTES
Patients belongings given to [facility]- belongings included (1) bag of clothes w/ purse+ shoes-- and $1899 kong given to Sukhwinder with [facility] crew, witnessed by Mariya Dyson.
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
## 2023-04-10 ENCOUNTER — TELEPHONE (OUTPATIENT)
Dept: PRIMARY CARE CLINIC | Facility: CLINIC | Age: 58
End: 2023-04-10

## 2023-04-10 NOTE — TELEPHONE ENCOUNTER
Good morning !  I called pt but I think her phone disconnected and I cant reach her I will try to call her back before I leave

## 2023-04-10 NOTE — TELEPHONE ENCOUNTER
----- Message from Yusra Conley sent at 4/10/2023  9:51 AM CDT -----  Contact: [phone]  Pt is calling for the nurse she is asking for a call back she states she states she was told some testing for her hop was suppose to be ordered for her through [facility] and she states the order was never put in

## 2023-04-10 NOTE — TELEPHONE ENCOUNTER
Good afternoon   I see the note about the labs needing to be scheduled at  but I am not sure if they got scheduled

## 2023-04-11 NOTE — TELEPHONE ENCOUNTER
Good morning !  Pt was asking who is supposed to schedule her cat scan or MRI. Pt says she is having major problems and she is having a lot of pain in her hips and is goes into her spine.

## 2023-04-11 NOTE — TELEPHONE ENCOUNTER
----- Message from Pepper Durán sent at 4/11/2023 10:15 AM CDT -----  Regarding: call back  Contact: [phone]  Who Called: PT     Patient called in requesting to speak with you. Did not specify why. Please advise.

## 2023-05-09 DIAGNOSIS — E03.9 HYPOTHYROIDISM, UNSPECIFIED TYPE: ICD-10-CM

## 2023-05-09 RX ORDER — LEVOTHYROXINE SODIUM 75 UG/1
75 TABLET ORAL
Qty: 90 TABLET | Refills: 3 | Status: SHIPPED | OUTPATIENT
Start: 2023-05-09 | End: 2023-05-15 | Stop reason: SDUPTHER

## 2023-05-09 NOTE — TELEPHONE ENCOUNTER
Good afternoon   Pt states that she needs an refill on levothyroxine (SYNTHROID) 75 MCG tablet,pt states that while she was in the hospital they changed her form the rosuvastatin (CRESTOR) 10 MG tablet to the atorvastatin 20mg the pt would like to know which prescription should she stay on.  Pt also does not know if she suppose to be on the 60 or 300 mg for the gabapentin because she is supposed to take them 3 times an day. Please give pt an call to advise

## 2023-05-09 NOTE — TELEPHONE ENCOUNTER
Good afternoon   She states while she was in the hospital, they went up on a dose of the synthroid. And she needs the synthroid refilled

## 2023-05-09 NOTE — TELEPHONE ENCOUNTER
No care due was identified.  [facility] Embedded Care Due Messages. Reference number: [id number].   5/09/2023 3:28:08 PM CDT

## 2023-05-09 NOTE — TELEPHONE ENCOUNTER
----- Message from Sebastian Parra sent at 5/9/2023 12:58 PM CDT -----  Type:  Patient Returning Call    Who Called:pt   Who Left Message for Patient:  Does the patient know what this is regarding?:rx refill  Would the patient rather a call back or a response via [facility]? call  Best Call Back Number:[phone]  Additional Information: pt  states that she needs an refill on levothyroxine (SYNTHROID) 75 MCG tablet,pt states that while she was in the hospital they changed her form the rosuvastatin (CRESTOR) 10 MG tablet to the atorvastatin 20mg the pt would like to know which prescription should she stay on.  Pt also does not know if she suppose to be on the 60 or 300 mg for the gabapentin because she is supposed to take them 3 times an day. Please give pt an call to advise

## 2023-05-15 DIAGNOSIS — E03.9 HYPOTHYROIDISM, UNSPECIFIED TYPE: ICD-10-CM

## 2023-05-15 RX ORDER — LEVOTHYROXINE SODIUM 75 UG/1
75 TABLET ORAL
Qty: 90 TABLET | Refills: 3 | Status: SHIPPED | OUTPATIENT
Start: 2023-05-15

## 2023-05-15 NOTE — TELEPHONE ENCOUNTER
----- Message from Nimo Moore sent at 5/15/2023 12:37 PM CDT -----  Contact: pt/[phone]  Type:  Patient Call    Who Called: PT    Would the patient rather a call back or a response via [facility]? Call   Best Call Back Number:[phone]  Additional Information: pt  called regarding  a  medication  request. Per  pt  stated that  she  spoke with  [facility] and they  fax  over  a  request.

## 2023-05-15 NOTE — TELEPHONE ENCOUNTER
Good afternoon   The pt is looking for refills on gabapentin, but I am not seeing it in her chart, also looking for a refill on the levothyroxine and a med that she takes 4 times a day

## 2023-05-15 NOTE — TELEPHONE ENCOUNTER
No care due was identified.  [facility] Embedded Care Due Messages. Reference number: [id number].   5/15/2023 1:17:32 PM CDT

## 2023-05-22 PROBLEM — Z00.00 PREVENTATIVE HEALTH CARE: Status: RESOLVED | Noted: 2023-02-15 | Resolved: 2023-05-22

## 2023-05-26 ENCOUNTER — NURSE TRIAGE (OUTPATIENT)
Dept: ADMINISTRATIVE | Facility: CLINIC | Age: 58
End: 2023-05-26
Payer: MEDICARE

## 2023-05-26 NOTE — TELEPHONE ENCOUNTER
Patient states she is out of Isocapent 1gm. She says she went to the hospital on 3/24 and was started on gabapentin and she is also out of that med. Not showing either medication on med list. Please contact caller directly to discuss.    Reason for Disposition   [1] Prescription refill request for NON-ESSENTIAL medicine (i.e., no harm to patient if med not taken) AND [2] triager unable to refill per department policy    Additional Information   Negative: [1] Prescription refill request for ESSENTIAL medicine (i.e., likelihood of harm to patient if not taken) AND [2] triager unable to refill per department policy   Negative: [1] Prescription not at pharmacy AND [2] was prescribed by PCP recently  (Exception: Triager has access to EMR and prescription is recorded there. Go to Home Care and confirm for pharmacy.)   Negative: [1] Pharmacy calling with prescription questions AND [2] triager unable to answer question   Negative: Prescription request for new medicine (not a refill)   Negative: Caller requesting a CONTROLLED substance prescription refill (e.g., narcotics, ADHD medicines)    Protocols used: Medication Refill and Renewal Call-A-

## 2023-06-15 ENCOUNTER — OFFICE VISIT (OUTPATIENT)
Dept: PRIMARY CARE CLINIC | Facility: CLINIC | Age: 58
End: 2023-06-15
Payer: MEDICARE

## 2023-06-15 VITALS
WEIGHT: 210.31 LBS | SYSTOLIC BLOOD PRESSURE: 119 MMHG | OXYGEN SATURATION: 95 % | BODY MASS INDEX: 35.9 KG/M2 | HEIGHT: 64 IN | RESPIRATION RATE: 18 BRPM | HEART RATE: 86 BPM | TEMPERATURE: 99 F | DIASTOLIC BLOOD PRESSURE: 79 MMHG

## 2023-06-15 DIAGNOSIS — G20.A1 PARKINSON DISEASE: ICD-10-CM

## 2023-06-15 DIAGNOSIS — J42 CHRONIC BRONCHITIS WITH WHEEZING: Primary | ICD-10-CM

## 2023-06-15 DIAGNOSIS — E78.2 MIXED HYPERLIPIDEMIA: ICD-10-CM

## 2023-06-15 DIAGNOSIS — F31.9 BIPOLAR 1 DISORDER: ICD-10-CM

## 2023-06-15 DIAGNOSIS — Z59.812 HOUSING INSTABILITY AFTER RECENT HOMELESSNESS: ICD-10-CM

## 2023-06-15 DIAGNOSIS — Z00.00 PREVENTATIVE HEALTH CARE: ICD-10-CM

## 2023-06-15 DIAGNOSIS — Z12.31 ENCOUNTER FOR SCREENING MAMMOGRAM FOR MALIGNANT NEOPLASM OF BREAST: ICD-10-CM

## 2023-06-15 DIAGNOSIS — B37.2 CANDIDAL DERMATITIS: ICD-10-CM

## 2023-06-15 DIAGNOSIS — Z72.0 TOBACCO USE: ICD-10-CM

## 2023-06-15 DIAGNOSIS — Z12.11 SCREENING FOR MALIGNANT NEOPLASM OF COLON: ICD-10-CM

## 2023-06-15 DIAGNOSIS — M85.852 OSTEOPENIA OF NECKS OF BOTH FEMURS: ICD-10-CM

## 2023-06-15 DIAGNOSIS — M85.851 OSTEOPENIA OF NECKS OF BOTH FEMURS: ICD-10-CM

## 2023-06-15 PROBLEM — M85.859 OSTEOPENIA OF NECK OF FEMUR: Status: ACTIVE | Noted: 2023-06-15

## 2023-06-15 PROCEDURE — 3078F DIAST BP <80 MM HG: CPT | Mod: CPTII,S$GLB,, | Performed by: INTERNAL MEDICINE

## 2023-06-15 PROCEDURE — 99999 PR PBB SHADOW E&M-EST. PATIENT-LVL V: CPT | Mod: PBBFAC,,, | Performed by: INTERNAL MEDICINE

## 2023-06-15 PROCEDURE — 3074F PR MOST RECENT SYSTOLIC BLOOD PRESSURE < 130 MM HG: ICD-10-PCS | Mod: CPTII,S$GLB,, | Performed by: INTERNAL MEDICINE

## 2023-06-15 PROCEDURE — 1159F MED LIST DOCD IN RCRD: CPT | Mod: CPTII,S$GLB,, | Performed by: INTERNAL MEDICINE

## 2023-06-15 PROCEDURE — 3074F SYST BP LT 130 MM HG: CPT | Mod: CPTII,S$GLB,, | Performed by: INTERNAL MEDICINE

## 2023-06-15 PROCEDURE — 99215 PR OFFICE/OUTPT VISIT, EST, LEVL V, 40-54 MIN: ICD-10-PCS | Mod: 25,S$GLB,, | Performed by: INTERNAL MEDICINE

## 2023-06-15 PROCEDURE — 94640 PR INHAL RX, AIRWAY OBST/DX SPUTUM INDUCT: ICD-10-PCS | Mod: S$GLB,,, | Performed by: INTERNAL MEDICINE

## 2023-06-15 PROCEDURE — 99215 OFFICE O/P EST HI 40 MIN: CPT | Mod: 25,S$GLB,, | Performed by: INTERNAL MEDICINE

## 2023-06-15 PROCEDURE — 3008F PR BODY MASS INDEX (BMI) DOCUMENTED: ICD-10-PCS | Mod: CPTII,S$GLB,, | Performed by: INTERNAL MEDICINE

## 2023-06-15 PROCEDURE — 3008F BODY MASS INDEX DOCD: CPT | Mod: CPTII,S$GLB,, | Performed by: INTERNAL MEDICINE

## 2023-06-15 PROCEDURE — 3044F PR MOST RECENT HEMOGLOBIN A1C LEVEL <7.0%: ICD-10-PCS | Mod: CPTII,S$GLB,, | Performed by: INTERNAL MEDICINE

## 2023-06-15 PROCEDURE — 94640 AIRWAY INHALATION TREATMENT: CPT | Mod: S$GLB,,, | Performed by: INTERNAL MEDICINE

## 2023-06-15 PROCEDURE — 1159F PR MEDICATION LIST DOCUMENTED IN MEDICAL RECORD: ICD-10-PCS | Mod: CPTII,S$GLB,, | Performed by: INTERNAL MEDICINE

## 2023-06-15 PROCEDURE — 3078F PR MOST RECENT DIASTOLIC BLOOD PRESSURE < 80 MM HG: ICD-10-PCS | Mod: CPTII,S$GLB,, | Performed by: INTERNAL MEDICINE

## 2023-06-15 PROCEDURE — 99999 PR PBB SHADOW E&M-EST. PATIENT-LVL V: ICD-10-PCS | Mod: PBBFAC,,, | Performed by: INTERNAL MEDICINE

## 2023-06-15 PROCEDURE — 3044F HG A1C LEVEL LT 7.0%: CPT | Mod: CPTII,S$GLB,, | Performed by: INTERNAL MEDICINE

## 2023-06-15 RX ORDER — IPRATROPIUM BROMIDE AND ALBUTEROL SULFATE 2.5; .5 MG/3ML; MG/3ML
3 SOLUTION RESPIRATORY (INHALATION)
Status: COMPLETED | OUTPATIENT
Start: 2023-06-15 | End: 2023-06-15

## 2023-06-15 RX ORDER — TIZANIDINE 4 MG/1
4 TABLET ORAL 3 TIMES DAILY
COMMUNITY
Start: 2023-04-09

## 2023-06-15 RX ORDER — ICOSAPENT ETHYL 1000 MG/1
2 CAPSULE ORAL 2 TIMES DAILY
Qty: 360 CAPSULE | Refills: 3 | Status: SHIPPED | OUTPATIENT
Start: 2023-06-15

## 2023-06-15 RX ORDER — NYSTATIN 100000 U/G
OINTMENT TOPICAL 3 TIMES DAILY
Qty: 30 G | Refills: 5 | Status: SHIPPED | OUTPATIENT
Start: 2023-06-15

## 2023-06-15 RX ORDER — BENZONATATE 200 MG/1
200 CAPSULE ORAL 3 TIMES DAILY PRN
Qty: 90 CAPSULE | Refills: 3 | Status: SHIPPED | OUTPATIENT
Start: 2023-06-15

## 2023-06-15 RX ADMIN — IPRATROPIUM BROMIDE AND ALBUTEROL SULFATE 3 ML: 2.5; .5 SOLUTION RESPIRATORY (INHALATION) at 11:06

## 2023-06-15 SDOH — SOCIAL DETERMINANTS OF HEALTH (SDOH): HOMELESS IN THE PAST 12 MONTHS: Z59.812

## 2023-06-15 NOTE — ASSESSMENT & PLAN NOTE
Restart Nicoderm 21mg daily to help stop smoking  Pt encouraged to stop smoking and get annual LDCT in Feb

## 2023-06-15 NOTE — ASSESSMENT & PLAN NOTE
sees Dr. Davis every 2 --3 mo; stable on seroquel; cymbalta 60mg bid; depakote, trazodone 200mg qhs

## 2023-06-15 NOTE — ASSESSMENT & PLAN NOTE
Use Bevespi 2 puffs bid  Use Albuterol MDI 2 puffs every 4 hours prn   Refill tessalon 200mg tid   Stop smoking  Give duoneb today

## 2023-06-15 NOTE — ASSESSMENT & PLAN NOTE
Get stool FIT   Order MMG at [facility]  Take Bevespi bid and albuterol prn   Stop smoking   Make appt with Dr. Parra for vision check   Needs Shingrex and COVID booster    Refill nystatin, vascepa, tessalon perles

## 2023-06-15 NOTE — PROGRESS NOTES
[facility] Progress Note      Chief Complaint     Follow-up, Medication Refill, Chest Pain, Urinary Tract Infection, and Hand Pain       Subjective:      History of Present Illness:  Emma Morales is a 57 y.o. female recently homeless living in her car in 3/2023 after her brother with alcoholism kicked her out of his apartment; now showers at [employer] and goes to her friends' homes.     Tobacco Use with COPD/Chronic Bronchitis: smokes 7 cigs but does not use her nicoderm patch; needs refill of tessalon perles; skips Bevespi and does not use her albuterol MDI; c/o chest tightness upon deep inspiration associated with wheezing    Bipolar: sees Dr. Davis every 2 --3 mo; stable on seroquel; cymbalta 60mg bid; depakote; trazodone 200mg qhs    Parkinson: Tremors now; stable on Sinemet/propanolol/xanaflex/baclofen as per Dr. Kohler     Recently went to ER on 6/08/2023 for acute emesis thought to be due to acute gastroenteritis: lipase 396.  Urine tox +amphetamine; was allegedly diagnosed with UTI   -pt feels she had food poisoning due to mayonnaise.     Rash under breast and pannus: requests refill of Nystatin    HLD: needs refill of Vascepa    Pt states she is allergic to gabapentin    Past Medical History:  Past Medical History:   Diagnosis Date    Abscess of chest wall 6/30/2014    Bipolar 1 disorder     Parkinson disease     Spinal injury     Trauma 12/27/2022    Wound discharge 7/15/2014       Past Surgical History:  Past Surgical History:   Procedure Laterality Date    abdominal cyst removal Left 2000    ELBOW SURGERY Bilateral 2014\2012    nerves    HYSTERECTOMY  2008    KNEE SURGERY Bilateral     MOUTH SURGERY         Allergies:  Review of patient's allergies indicates:   Allergen Reactions    Nsaids (non-steroidal anti-inflammatory drug) Anaphylaxis    Ibuprofen Hives    Indomethacin Hives    Amitriptyline      will kill her    Cefdinir     Doxycycline     Fluconazole      "Gabapentin Other (See Comments)     Pt states "she cant remember"     Lamotrigine Hives    Lurasidone     Methylprednisolone     Naproxen Hives    Ciprofloxacin Palpitations       Home Medications:  Current Outpatient Medications   Medication Sig Dispense Refill    albuterol (VENTOLIN HFA) 90 mcg/actuation inhaler Inhale 2 puffs into the lungs every 6 (six) hours as needed for Wheezing. Rescue 18 g 0    baclofen (LIORESAL) 10 MG tablet Take 10 mg by mouth 3 (three) times daily.      carbidopa-levodopa  mg (SINEMET)  mg per tablet   3    cholecalciferol, vitamin D3, (D3-2000 ORAL) Take by mouth.      clonazepam (KLONOPIN) 2 MG Tab Take 8 mg by mouth 2 (two) times daily as needed.   1    divalproex (DEPAKOTE) 500 MG TbEC   1    DULoxetine (CYMBALTA) 60 MG capsule Take 60 mg by mouth 2 (two) times daily.      glycopyrrolate-formoteroL (BEVESPI AEROSPHERE) 9-4.8 mcg HFAA Inhale 2 puffs into the lungs 2 (two) times daily. Controller 10.7 g 0    levothyroxine (SYNTHROID) 75 MCG tablet Take 1 tablet (75 mcg total) by mouth before breakfast. 90 tablet 3    mupirocin (BACTROBAN) 2 % ointment Apply to affected area 3 times daily 22 g 1    nicotine (NICODERM CQ) 21 mg/24 hr Place 1 patch onto the skin once daily. 38 patch 3    propranolol (INDERAL) 40 MG tablet   6    quetiapine fumarate (SEROQUEL ORAL) Take by mouth.      rosuvastatin (CRESTOR) 10 MG tablet Take 1 tablet (10 mg total) by mouth once daily. 90 tablet 3    traMADoL (ULTRAM) 50 mg tablet Take 1 tablet (50 mg total) by mouth every 6 (six) hours. 21 tablet 0    trazodone (DESYREL) 100 MG tablet Take 200 mg by mouth every evening.  1    benzonatate (TESSALON) 200 MG capsule Take 1 capsule (200 mg total) by mouth 3 (three) times daily as needed for Cough. 90 capsule 3    carbidopa-levodopa-entacapone -200 mg (STALEVO) -200 mg Tab Take 1 tablet by mouth 3 (three) times daily.      icosapent ethyL (VASCEPA) 1 gram Cap Take 2 capsules (2 g total) " "by mouth 2 (two) times daily. 360 capsule 3    nystatin (MYCOSTATIN) ointment Apply topically 3 (three) times daily. 30 g 5    tiZANidine (ZANAFLEX) 4 MG tablet Take 4 mg by mouth 3 (three) times daily.       No current facility-administered medications for this visit.        Family History:  Family History   Problem Relation Age of Onset    Hypertension Mother     Heart disease Father        Social History:  Social History     Tobacco Use    Smoking status: Every Day     Types: Cigarettes    Smokeless tobacco: Never   Substance Use Topics    Alcohol use: No    Drug use: No       Review of Systems:  Pertinent positives and negatives listed in HPI. All other systems are reviewed and are negative.    Health Maintaince :   Health Maintenance Topics with due status: Not Due       Topic Last Completion Date    Influenza Vaccine 02/22/2018    Hemoglobin A1c (Diabetic Prevention Screening) 02/15/2023    Lipid Panel 02/15/2023           Eye: needs eye appt with Dr. Parra  Dental:     Immunizations:   Tdap: n /a.  Influenza:   Pneumovax: UTD  Shingrex x 2: needs  COVID: needs  Hepatitis C:   Cancer Screening:  PAP: UTD   Mammogram: needs now  Colonoscopy: needs now  DEXA:  10/2021:  osteopenia bilateral femoral neck (left -1.2; right -1.6)  LDCT: 2/2023:  STABLE SUBPLEURAL NODULE MEASURING UP TO 7 MM IN THE POSTERIOR RIGHT UPPER LOBE.     The 10-year ASCVD risk score (Edward PAGAN, et al., 2019) is: 4.9%    Values used to calculate the score:      Age: 57 years      Sex: Female      Is Non- : No      Diabetic: No      Tobacco smoker: Yes      Systolic Blood Pressure: 119 mmHg      Is BP treated: No      HDL Cholesterol: 55 mg/dL      Total Cholesterol: 169 mg/dL      Objective:   /79 (BP Location: Right arm, Patient Position: Sitting, BP Method: Medium (Manual))   Pulse 86   Temp 98.5 °F (36.9 °C)   Resp 18   Ht 5' 4" (1.626 m)   Wt 95.4 kg (210 lb 5.1 oz)   LMP  (LMP Unknown) Comment: " hysterectomy  SpO2 95%   BMI 36.10 kg/m²      Body mass index is 36.1 kg/m².       Physical Examination:  General: Alert and awake in no apparent distress  HEENT: Normocephalic and atraumatic; Tms WNL  Eyes:  PERRL; EOMi with anicteric sclera and clear conjunctivae  Mouth:  Oropharynx clear and without exudate; moist mucous membranes  Neck:   Cervical nodes not enlarged; supple; no bruits  Cardio:  Regular rate and rhythm with normal S1 and S2; no murmurs or rubs  Resp:  CTAB; respirations unlabored; expiratory wheezes in posterior lung fields without  crackles or rhonchi  Abdom: Soft, NTND with normoactive bowel sounds; negative HSM  Extrem: Warm and well-perfused with no clubbing, cyanosis or edema  Skin:  No rashes, lesions, or color changes  Pulses:  2+ and symmetric distally  Neuro:  AAOx3; cooperative and pleasant with no focal deficits    Laboratory:      Most Recent Data:  Lab Results   Component Value Date    WBC 9.96 03/25/2023    HGB 13.4 03/25/2023    HCT 41.4 03/25/2023     03/25/2023    CHOL 169 02/15/2023    TRIG 70 02/15/2023    HDL 55 02/15/2023    ALT 12 03/25/2023    AST 33 03/25/2023     (L) 03/25/2023    K 4.0 03/25/2023    CL 96 03/25/2023    BUN 10 03/25/2023    CO2 24 03/25/2023    TSH 1.012 03/25/2023    HGBA1C 5.3 02/15/2023              CBC:   WBC   Date Value Ref Range Status   03/25/2023 9.96 3.90 - 12.70 K/uL Final     Hemoglobin   Date Value Ref Range Status   03/25/2023 13.4 12.0 - 16.0 g/dL Final     Hematocrit   Date Value Ref Range Status   03/25/2023 41.4 37.0 - 48.5 % Final     Platelets   Date Value Ref Range Status   03/25/2023 173 150 - 450 K/uL Final     MCV   Date Value Ref Range Status   03/25/2023 99 (H) 82 - 98 fL Final     RDW   Date Value Ref Range Status   03/25/2023 13.4 11.5 - 14.5 % Final     BMP:   Sodium   Date Value Ref Range Status   03/25/2023 131 (L) 136 - 145 mmol/L Final     Potassium   Date Value Ref Range Status   03/25/2023 4.0 3.5 - 5.1  mmol/L Final     Chloride   Date Value Ref Range Status   03/25/2023 96 95 - 110 mmol/L Final     CO2   Date Value Ref Range Status   03/25/2023 24 23 - 29 mmol/L Final     BUN   Date Value Ref Range Status   03/25/2023 10 6 - 20 mg/dL Final     Creatinine   Date Value Ref Range Status   03/25/2023 0.8 0.5 - 1.4 mg/dL Final     Glucose   Date Value Ref Range Status   03/25/2023 98 70 - 110 mg/dL Final     Calcium   Date Value Ref Range Status   03/25/2023 9.3 8.7 - 10.5 mg/dL Final     LFTs:   Total Protein   Date Value Ref Range Status   03/25/2023 6.6 6.0 - 8.4 g/dL Final     Albumin   Date Value Ref Range Status   03/25/2023 4.1 3.5 - 5.2 g/dL Final     Total Bilirubin   Date Value Ref Range Status   03/25/2023 0.4 0.1 - 1.0 mg/dL Final     Comment:     For infants and newborns, interpretation of results should be based  on gestational age, weight and in agreement with clinical  observations.    Premature Infant recommended reference ranges:  Up to 24 hours.............<8.0 mg/dL  Up to 48 hours............<12.0 mg/dL  3-5 days..................<15.0 mg/dL  6-29 days.................<15.0 mg/dL       AST   Date Value Ref Range Status   03/25/2023 33 10 - 40 U/L Final     Alkaline Phosphatase   Date Value Ref Range Status   03/25/2023 62 55 - 135 U/L Final     ALT   Date Value Ref Range Status   03/25/2023 12 10 - 44 U/L Final     Coags: No results found for: INR, PROTIME, PTT  FLP:      Lab Results   Component Value Date    CHOL 169 02/15/2023    CHOL 264 (H) 12/14/2010    CHOL 229 (H) 09/22/2010     Lab Results   Component Value Date    HDL 55 02/15/2023    HDL 38 (L) 12/14/2010    HDL 37 (L) 09/22/2010     Lab Results   Component Value Date    LDLCALC 100.0 02/15/2023    LDLCALC 166.2 (H) 12/14/2010    LDLCALC Invalid, Trig > 400 09/22/2010     Lab Results   Component Value Date    TRIG 70 02/15/2023    TRIG 299 (H) 12/14/2010    TRIG 427 (H) 09/22/2010     Lab Results   Component Value Date    CHOLHDL 32.5  02/15/2023    CHOLHDL 14.4 (L) 12/14/2010    CHOLHDL 16.2 (L) 09/22/2010      DM:      Hemoglobin A1C   Date Value Ref Range Status   02/15/2023 5.3 4.0 - 5.6 % Final     Comment:     ADA Screening Guidelines:  5.7-6.4%  Consistent with prediabetes  >or=6.5%  Consistent with diabetes    High levels of fetal hemoglobin interfere with the HbA1C  assay. Heterozygous hemoglobin variants (HbS, HgC, etc)do  not significantly interfere with this assay.   However, presence of multiple variants may affect accuracy.       LDL Cholesterol   Date Value Ref Range Status   02/15/2023 100.0 63.0 - 159.0 mg/dL Final     Comment:     The National Cholesterol Education Program (NCEP) has set the  following guidelines (reference values) for LDL Cholesterol:  Optimal.......................<130 mg/dL  Borderline High...............130-159 mg/dL  High..........................160-189 mg/dL  Very High.....................>190 mg/dL       Creatinine   Date Value Ref Range Status   03/25/2023 0.8 0.5 - 1.4 mg/dL Final     Thyroid:   TSH   Date Value Ref Range Status   03/25/2023 1.012 0.400 - 4.000 uIU/mL Final     Free T4   Date Value Ref Range Status   02/15/2023 0.99 0.71 - 1.51 ng/dL Final     T4, Total   Date Value Ref Range Status   12/14/2010 6.7 4.5 - 11.5 ug/dl Final     T3, Total   Date Value Ref Range Status   11/09/2009 106 60 - 180 ng/dl Final     Anemia:   Vitamin B-12   Date Value Ref Range Status   02/08/2010 536 210 - 950 pg/ml Final     Folate   Date Value Ref Range Status   02/08/2010 13.3 4.0 - 24.0 ng/ml Final     Cardiac: No results found for: TROPONINI, CKTOTAL, CKMB, BNP  Urinalysis:   Color, UA   Date Value Ref Range Status   03/25/2023 Colorless (A) Yellow, Straw, Kristin Final     Specific Gravity, UA   Date Value Ref Range Status   03/25/2023 1.000 (A) 1.005 - 1.030 Final     Nitrite, UA   Date Value Ref Range Status   03/25/2023 Negative Negative Final     Ketones, UA   Date Value Ref Range Status   03/25/2023  Negative Negative Final     Urobilinogen, UA   Date Value Ref Range Status   06/22/2009 0.2 0 - 1 EU/DL Final       Other Results:  EKG (my interpretation):     Radiology:  XR CHEST PA AND LATERAL  Narrative: EXAMINATION:  CHEST PA AND LATERAL    CLINICAL HISTORY:  Wheezing    TECHNIQUE:  PA and lateral chest radiograph    COMPARISON:  12/27/2022    FINDINGS:  The cardiac silhouette is within normal limits.   There is no focal consolidation, pneumothorax, or pleural effusion.  Mild levoscoliosis is present.  Impression: No acute intrathoracic abnormality.    Electronically signed by: Michelle Patel  Date:    02/14/2023  Time:    19:31          Assessment/Plan     Emma Morales is a 57 y.o. female with:  1. Chronic bronchitis with wheezing  Assessment & Plan:  Use Bevespi 2 puffs bid  Use Albuterol MDI 2 puffs every 4 hours prn   Refill tessalon 200mg tid   Stop smoking  Give duoneb today    Orders:  -     benzonatate (TESSALON) 200 MG capsule; Take 1 capsule (200 mg total) by mouth 3 (three) times daily as needed for Cough.  Dispense: 90 capsule; Refill: 3  -     albuterol-ipratropium 2.5 mg-0.5 mg/3 mL nebulizer solution 3 mL    2. Screening for malignant neoplasm of colon  -     Fecal Immunochemical Test (iFOBT); Future; Expected date: 06/15/2023    3. Encounter for screening mammogram for malignant neoplasm of breast  -     Mammo Digital Screening Bilat; Future; Expected date: 06/15/2023    4. Preventative health care  Assessment & Plan:  Get stool FIT   Order MMG at [facility]  Take Bevespi bid and albuterol prn   Stop smoking   Make appt with Dr. Parra for vision check   Needs Shingrex and COVID booster    Refill nystatin, vascepa, tessalon perles       5. Osteopenia of necks of both femurs  Overview:  DEXA:  10/2021:  osteopenia bilateral femoral neck (left -1.2; right -1.6)    Assessment & Plan:  Cont Vit D 2000 units daily and weight bearing exercise      6. Bipolar 1 disorder  Overview:  Psych dr. Carter  [place]    Assessment & Plan:  sees Dr. Davis every 2 --3 mo; stable on seroquel; cymbalta 60mg bid; depakote, trazodone 200mg qhs      7. Candidal dermatitis  -     nystatin (MYCOSTATIN) ointment; Apply topically 3 (three) times daily.  Dispense: 30 g; Refill: 5    8. Mixed hyperlipidemia  Assessment & Plan:  Refill Vascepa 2 gm po bid  Cont Crestor 10mg daily     Orders:  -     icosapent ethyL (VASCEPA) 1 gram Cap; Take 2 capsules (2 g total) by mouth 2 (two) times daily.  Dispense: 360 capsule; Refill: 3    9. Parkinson disease  Assessment & Plan:  stable on Sinemet/propanolol/xanaflex/baclofen  Keep appt with Dr. carrington       10. Tobacco use  Overview:  LDCT 2/2023 stable    Assessment & Plan:  Restart Nicoderm 21mg daily to help stop smoking  Pt encouraged to stop smoking and get annual LDCT in Feb 11. Housing instability after recent homelessness  Assessment & Plan:  Pt currently homeless living in her car since 3/2023 - has friends to help her - hope to get housing soon               I spent a total of 74 minutes on the day of the visit.This includes face to face time and non-face to face time preparing to see the patient (eg, review of tests), obtaining and/or reviewing separately obtained history, documenting clinical information in the electronic or other health record, independently interpreting results and communicating results to the patient/family/caregiver, or care coordinator.   Code Status:     Annetta Roe MD

## 2023-06-16 NOTE — ASSESSMENT & PLAN NOTE
Pt currently homeless living in her car since 3/2023 - has friends to help her - hope to get housing soon

## 2023-09-18 PROBLEM — Z00.00 PREVENTATIVE HEALTH CARE: Status: RESOLVED | Noted: 2023-02-15 | Resolved: 2023-09-18

## 2023-10-14 ENCOUNTER — NURSE TRIAGE (OUTPATIENT)
Dept: ADMINISTRATIVE | Facility: CLINIC | Age: 58
End: 2023-10-14
Payer: MEDICARE

## 2023-10-14 NOTE — TELEPHONE ENCOUNTER
Pt reports abscess near breast. Advised, per protocol. Verbalizes understanding.    Reason for Disposition   [1] Swelling is red AND [2] size > 2 inches (5.0 cm)  (Exception: Itchy area of skin.)    Additional Information   Negative: Sounds like a life-threatening emergency to the triager   Negative: Patient sounds very sick or weak to the triager    Protocols used: Skin Lump or Localized Swelling-A-AH

## 2023-10-18 ENCOUNTER — TELEPHONE (OUTPATIENT)
Dept: PRIMARY CARE CLINIC | Facility: CLINIC | Age: 58
End: 2023-10-18
Payer: MEDICARE

## 2023-10-18 ENCOUNTER — PATIENT MESSAGE (OUTPATIENT)
Dept: PRIMARY CARE CLINIC | Facility: CLINIC | Age: 58
End: 2023-10-18
Payer: MEDICARE

## 2023-10-18 DIAGNOSIS — Z12.31 ENCOUNTER FOR SCREENING MAMMOGRAM FOR MALIGNANT NEOPLASM OF BREAST: Primary | ICD-10-CM

## 2023-10-18 NOTE — TELEPHONE ENCOUNTER
Please see message regarding, Pt would like a referral for a mammogram and ultrasound  She went to Urgent care they don't do mammograms or ultrasounds there. She states they advised her to have her PCP to order. She has lumps in her Breast. Pt says Breast cancer runs in her family.   She wants referrals sent to [facility].

## 2023-10-18 NOTE — TELEPHONE ENCOUNTER
----- Message from Delfino Driver sent at 10/18/2023 12:34 PM CDT -----  Type:  Patient Returning Call    Who Called:pt  Who Left Message for Patient:  Does the patient know what this is regarding?:imaging orders put in  Would the patient rather a call back or a response via [facility]? Call  Best Call Back Number:[phone]  Additional Information: pt states she would like orders put in for a mammo as well as an US. Pt states this is urgent

## 2023-10-19 ENCOUNTER — TELEPHONE (OUTPATIENT)
Dept: PRIMARY CARE CLINIC | Facility: CLINIC | Age: 58
End: 2023-10-19
Payer: MEDICARE

## 2023-10-19 ENCOUNTER — PATIENT MESSAGE (OUTPATIENT)
Dept: PRIMARY CARE CLINIC | Facility: CLINIC | Age: 58
End: 2023-10-19
Payer: MEDICARE

## 2023-10-19 ENCOUNTER — PATIENT OUTREACH (OUTPATIENT)
Dept: ADMINISTRATIVE | Facility: HOSPITAL | Age: 58
End: 2023-10-19
Payer: MEDICARE

## 2023-10-19 DIAGNOSIS — Z12.11 SCREENING FOR COLON CANCER: ICD-10-CM

## 2023-10-19 NOTE — TELEPHONE ENCOUNTER
Spoke with pt, pt scheduled to come in tomorrow at 1230 to have breast examined by Dr. Roe.     Pt aware an expresses understanding.

## 2023-10-19 NOTE — TELEPHONE ENCOUNTER
----- Message from Betty Ni sent at 10/19/2023  1:55 PM CDT -----  Type: Mammogram  Caller is requesting to schedule their annual mammogram appointment. Order is   not listed in EPIC. Please enter order and contact patient to schedule.  Name of Caller:nurse at  (karime)  Where would they like the mammogram performed?  Would the patient rather a call back or a response via [facility]? Call back   Best Call Back Number:[phone] (karime)  Additional Information: Karime indicates Pt is currently at . Sekou indicates the pt feels she is getting the run around. karime indicates the pt has been trying to get her mammogram for a while. Sekou indicates if possible can you fax the referral to this fax number. Fax # for  is [phone]. Karime states if you need to speak giver her a call back at number provided.

## 2023-10-19 NOTE — TELEPHONE ENCOUNTER
----- Message from Mally Macdonald sent at 10/19/2023 10:30 AM CDT -----  Regarding: please call , Mammogram  Contact: pt  Please call Pt Regarding Mammogram and Ultrasound    Phone [phone]    Thank You

## 2023-10-19 NOTE — TELEPHONE ENCOUNTER
----- Message from Michelle Lr sent at 10/19/2023  3:46 PM CDT -----  Contact: [facility]   Emily with [facility] would like to see if an order for a Mammo can be faxed to  [phone]

## 2023-10-19 NOTE — TELEPHONE ENCOUNTER
----- Message from Tati Palumbo sent at 10/19/2023 11:15 AM CDT -----    Patient Returning Call        Who Called:pt  Does the patient know what this is regarding?:this is the fax number to send pt orders to [facility] Fax number is [phone] . The mammogram and ultra sound of left breast  Would the patient rather a call back or a response via [facility]? call  Best Call Back Number:[phone]  Additional Information: call back

## 2023-10-20 ENCOUNTER — OFFICE VISIT (OUTPATIENT)
Dept: PRIMARY CARE CLINIC | Facility: CLINIC | Age: 58
End: 2023-10-20
Payer: MEDICARE

## 2023-10-20 VITALS
BODY MASS INDEX: 37.07 KG/M2 | HEIGHT: 64 IN | DIASTOLIC BLOOD PRESSURE: 72 MMHG | SYSTOLIC BLOOD PRESSURE: 130 MMHG | HEART RATE: 95 BPM | RESPIRATION RATE: 18 BRPM | OXYGEN SATURATION: 96 % | TEMPERATURE: 98 F | WEIGHT: 217.13 LBS

## 2023-10-20 DIAGNOSIS — M54.50 CHRONIC BILATERAL LOW BACK PAIN WITHOUT SCIATICA: ICD-10-CM

## 2023-10-20 DIAGNOSIS — Z12.31 ENCOUNTER FOR SCREENING MAMMOGRAM FOR MALIGNANT NEOPLASM OF BREAST: ICD-10-CM

## 2023-10-20 DIAGNOSIS — G89.29 CHRONIC BILATERAL LOW BACK PAIN WITHOUT SCIATICA: ICD-10-CM

## 2023-10-20 DIAGNOSIS — Z00.00 PREVENTATIVE HEALTH CARE: ICD-10-CM

## 2023-10-20 DIAGNOSIS — Z80.3 FAMILY HISTORY OF BREAST CANCER IN FIRST DEGREE RELATIVE: ICD-10-CM

## 2023-10-20 DIAGNOSIS — F31.9 BIPOLAR 1 DISORDER: ICD-10-CM

## 2023-10-20 DIAGNOSIS — Z72.0 TOBACCO USE: ICD-10-CM

## 2023-10-20 DIAGNOSIS — M85.851 OSTEOPENIA OF NECK OF RIGHT FEMUR: ICD-10-CM

## 2023-10-20 DIAGNOSIS — L98.491 SKIN ULCER, LIMITED TO BREAKDOWN OF SKIN: Primary | ICD-10-CM

## 2023-10-20 PROCEDURE — 99999 PR PBB SHADOW E&M-EST. PATIENT-LVL V: CPT | Mod: PBBFAC,,, | Performed by: INTERNAL MEDICINE

## 2023-10-20 PROCEDURE — 3075F PR MOST RECENT SYSTOLIC BLOOD PRESS GE 130-139MM HG: ICD-10-PCS | Mod: CPTII,S$GLB,, | Performed by: INTERNAL MEDICINE

## 2023-10-20 PROCEDURE — 3008F BODY MASS INDEX DOCD: CPT | Mod: CPTII,S$GLB,, | Performed by: INTERNAL MEDICINE

## 2023-10-20 PROCEDURE — 1159F MED LIST DOCD IN RCRD: CPT | Mod: CPTII,S$GLB,, | Performed by: INTERNAL MEDICINE

## 2023-10-20 PROCEDURE — 99214 PR OFFICE/OUTPT VISIT, EST, LEVL IV, 30-39 MIN: ICD-10-PCS | Mod: S$GLB,,, | Performed by: INTERNAL MEDICINE

## 2023-10-20 PROCEDURE — 3044F PR MOST RECENT HEMOGLOBIN A1C LEVEL <7.0%: ICD-10-PCS | Mod: CPTII,S$GLB,, | Performed by: INTERNAL MEDICINE

## 2023-10-20 PROCEDURE — 3075F SYST BP GE 130 - 139MM HG: CPT | Mod: CPTII,S$GLB,, | Performed by: INTERNAL MEDICINE

## 2023-10-20 PROCEDURE — 3008F PR BODY MASS INDEX (BMI) DOCUMENTED: ICD-10-PCS | Mod: CPTII,S$GLB,, | Performed by: INTERNAL MEDICINE

## 2023-10-20 PROCEDURE — 1159F PR MEDICATION LIST DOCUMENTED IN MEDICAL RECORD: ICD-10-PCS | Mod: CPTII,S$GLB,, | Performed by: INTERNAL MEDICINE

## 2023-10-20 PROCEDURE — 3078F PR MOST RECENT DIASTOLIC BLOOD PRESSURE < 80 MM HG: ICD-10-PCS | Mod: CPTII,S$GLB,, | Performed by: INTERNAL MEDICINE

## 2023-10-20 PROCEDURE — 99214 OFFICE O/P EST MOD 30 MIN: CPT | Mod: S$GLB,,, | Performed by: INTERNAL MEDICINE

## 2023-10-20 PROCEDURE — 3078F DIAST BP <80 MM HG: CPT | Mod: CPTII,S$GLB,, | Performed by: INTERNAL MEDICINE

## 2023-10-20 PROCEDURE — 3044F HG A1C LEVEL LT 7.0%: CPT | Mod: CPTII,S$GLB,, | Performed by: INTERNAL MEDICINE

## 2023-10-20 PROCEDURE — 99999 PR PBB SHADOW E&M-EST. PATIENT-LVL V: ICD-10-PCS | Mod: PBBFAC,,, | Performed by: INTERNAL MEDICINE

## 2023-10-20 RX ORDER — DEXTROAMPHETAMINE SACCHARATE, AMPHETAMINE ASPARTATE, DEXTROAMPHETAMINE SULFATE AND AMPHETAMINE SULFATE 2.5; 2.5; 2.5; 2.5 MG/1; MG/1; MG/1; MG/1
TABLET ORAL
COMMUNITY

## 2023-10-21 PROBLEM — G89.29 CHRONIC BILATERAL LOW BACK PAIN: Status: ACTIVE | Noted: 2023-10-21

## 2023-10-21 PROBLEM — Z80.3 FAMILY HISTORY OF BREAST CANCER IN FIRST DEGREE RELATIVE: Status: ACTIVE | Noted: 2023-10-21

## 2023-10-21 PROBLEM — L98.499 SKIN ULCER: Status: ACTIVE | Noted: 2023-10-21

## 2023-10-21 PROBLEM — M54.50 CHRONIC BILATERAL LOW BACK PAIN: Status: ACTIVE | Noted: 2023-10-21

## 2023-10-21 NOTE — ASSESSMENT & PLAN NOTE
Needs genetic testing since mom, maternal aunts with breast cancer  Pt to find out if genetic testing is offered at   Will refer to H/O, Genetics, GYN for her care preferably at .    MMG ordered 10/25/2023 - I have given Loren, an  , my cell # to contact me personally if further imaging is recommended by radiology.   Also, I gave her our clinic fax # to fax the results to our clinic.

## 2023-10-21 NOTE — PATIENT INSTRUCTIONS
Stop smoking  Get RSV, COVID, FLU, Shingrex, Prevnar 20 vaccines    Needs genetic testing since mom, maternal aunts with breast cancer  Pt to find out if genetic testing is offered at   Will refer to H/O, Genetics, GYN for her care preferably at .

## 2023-10-21 NOTE — ASSESSMENT & PLAN NOTE
Avoid scratching   Keep clean and dry, apply anti-bacterial ointment bid and cover   Get MMG now at [facility]

## 2023-10-21 NOTE — PROGRESS NOTES
[facility] Progress Note      Chief Complaint     Breast Pain (Left breast concers /States that she have a cyst or lump in he breast )       Subjective:      History of Present Illness:  Emma Morales is a 58 y.o. female     C/o 3-wk hx of left breast ulceration that started as a pimple that pt squeezed but no purulent discharge noted. Pt states that it likely erupted due to being excessively sweaty at times. Pt had been living in her car but has now found housing in [place] near [facility]. Denies fever, chills, pain at this time.   Pt had called Call Center on 10/14 for concern of breast abscess and was directed to Urgent Care which was re-iterated when she called on Monday, Oct 16 since I had no openings in clinic.   Pt is specifically asking for both MMG and Ultrasound since her doctor had ordered both in the past (no documentation noted in the EHR); her family member, Loren, an  imaging scheduler, told the patient that she needed both if she felt breast nodules.  Pt is concerned about breast cancer due to strong FH of cancer in multiple family members including mom, maternal aunt.   [facility] ordered [facility] by me in Feb 2023 and June 2023 but never done for unclear reasons.   MMG  now scheduled at  on 10/25/2023    Bipolar depression: cont to see Dr. Emma Davis; compliant with all meds     Chronic LBP/hip pain: sees Neurology who will order MRI LS    COPD with stable pulm nodule: followed by pulmonary on inhalers; stable LDCT 2/2023  .    Past Medical History:  Past Medical History:   Diagnosis Date    Abscess of chest wall 6/30/2014    Bipolar 1 disorder     Parkinson disease     Spinal injury     Trauma 12/27/2022    Wound discharge 7/15/2014       Past Surgical History:  Past Surgical History:   Procedure Laterality Date    abdominal cyst removal Left 2000    ELBOW SURGERY Bilateral 2014\2012    nerves    HYSTERECTOMY  2008    KNEE SURGERY Bilateral     MOUTH SURGERY    "      Allergies:  Review of patient's allergies indicates:   Allergen Reactions    Nsaids (non-steroidal anti-inflammatory drug) Anaphylaxis    Ibuprofen Hives    Indomethacin Hives    Amitriptyline      will kill her    Cefdinir     Doxycycline     Fluconazole     Gabapentin Other (See Comments)     Pt states "she cant remember"     Lamotrigine Hives    Lurasidone     Methylprednisolone     Naproxen Hives    Ciprofloxacin Palpitations       Home Medications:  Current Outpatient Medications   Medication Sig Dispense Refill    albuterol (VENTOLIN HFA) 90 mcg/actuation inhaler Inhale 2 puffs into the lungs every 6 (six) hours as needed for Wheezing. Rescue 18 g 0    baclofen (LIORESAL) 10 MG tablet Take 10 mg by mouth 3 (three) times daily.      benzonatate (TESSALON) 200 MG capsule Take 1 capsule (200 mg total) by mouth 3 (three) times daily as needed for Cough. 90 capsule 3    carbidopa-levodopa  mg (SINEMET)  mg per tablet   3    clonazepam (KLONOPIN) 2 MG Tab Take 8 mg by mouth 2 (two) times daily as needed.   1    dextroamphetamine-amphetamine 10 mg Tab TK 1 T PO  QID      divalproex (DEPAKOTE) 500 MG TbEC   1    DULoxetine (CYMBALTA) 60 MG capsule Take 60 mg by mouth 2 (two) times daily.      glycopyrrolate-formoteroL (BEVESPI AEROSPHERE) 9-4.8 mcg HFAA Inhale 2 puffs into the lungs 2 (two) times daily. Controller 10.7 g 0    icosapent ethyL (VASCEPA) 1 gram Cap Take 2 capsules (2 g total) by mouth 2 (two) times daily. 360 capsule 3    levothyroxine (SYNTHROID) 75 MCG tablet Take 1 tablet (75 mcg total) by mouth before breakfast. 90 tablet 3    mupirocin (BACTROBAN) 2 % ointment Apply to affected area 3 times daily 22 g 1    nystatin (MYCOSTATIN) ointment Apply topically 3 (three) times daily. 30 g 5    propranolol (INDERAL) 40 MG tablet   6    quetiapine fumarate (SEROQUEL ORAL) Take by mouth.      rosuvastatin (CRESTOR) 10 MG tablet Take 1 tablet (10 mg total) by mouth once daily. 90 tablet 3    " tiZANidine (ZANAFLEX) 4 MG tablet Take 4 mg by mouth 3 (three) times daily.      traMADoL (ULTRAM) 50 mg tablet Take 1 tablet (50 mg total) by mouth every 6 (six) hours. 21 tablet 0    trazodone (DESYREL) 100 MG tablet Take 200 mg by mouth every evening.  1    carbidopa-levodopa-entacapone -200 mg (STALEVO) -200 mg Tab Take 1 tablet by mouth 3 (three) times daily.      cholecalciferol, vitamin D3, (D3-2000 ORAL) Take by mouth.      nicotine (NICODERM CQ) 21 mg/24 hr Place 1 patch onto the skin once daily. (Patient not taking: Reported on 10/20/2023) 38 patch 3     No current facility-administered medications for this visit.        Family History:  Family History   Problem Relation Age of Onset    Hypertension Mother     Heart disease Father        Social History:  Social History     Tobacco Use    Smoking status: Every Day     Current packs/day: 0.50     Average packs/day: 0.5 packs/day for 2.7 years (1.4 ttl pk-yrs)     Types: Cigarettes     Start date: 2/3/2021    Smokeless tobacco: Never   Substance Use Topics    Alcohol use: No    Drug use: No       Review of Systems:  Pertinent positives and negatives listed in HPI. All other systems are reviewed and are negative.    Health Maintaince :   Health Maintenance Topics with due status: Not Due       Topic Last Completion Date    TETANUS VACCINE 10/28/2014    Hemoglobin A1c (Diabetic Prevention Screening) 02/15/2023    Lipid Panel 02/15/2023           Eye:   Dental:     Immunizations:   Tdap: n/a.  Influenza: needs.  Pneumovax:   Shingrex x 2: needs  COVID: needs  Hepatitis C:   Cancer Screening:  PAP: s/p hyst  Mammogram:  schedule now at    Colonoscopy: Stool FIT ordered 10/19/2023   DEXA:  10/18/2021:  right FN -1.6; left FN -1.2  LDCT: 2/2023: stable subpleural nodule 7mm right posterior upper lobe    The 10-year ASCVD risk score ([place] PAGAN, et al., 2019) is: 6.2%    Values used to calculate the score:      Age: 58 years      Sex: Female      Is  "Non- : No      Diabetic: No      Tobacco smoker: Yes      Systolic Blood Pressure: 130 mmHg      Is BP treated: No      HDL Cholesterol: 55 mg/dL      Total Cholesterol: 169 mg/dL      Objective:   /72 (BP Location: Left arm, Patient Position: Sitting, BP Method: Small (Manual))   Pulse 95   Temp 98 °F (36.7 °C) (Oral)   Resp 18   Ht 5' 4" (1.626 m)   Wt 98.5 kg (217 lb 2.5 oz)   LMP  (LMP Unknown) Comment: hysterectomy  SpO2 96%   BMI 37.27 kg/m²      Body mass index is 37.27 kg/m².       Physical Examination:  General: Alert and awake in no apparent distress  HEENT: Normocephalic and atraumatic  Eyes:  PERRL; EOMi with anicteric sclera and clear conjunctivae  Neck:   Cervical nodes not enlarged; supple; no bruits  Cardio:  Regular rate and rhythm with normal S1 and S2; no murmurs or rubs  Breasts:           no axillary LAD; bilateral breasts without distinct nodes with mild fibrocystic diffusely; no nipple discharge; left breast with bright-red 4 mm ulceration without discharge, non-tender.  Resp:  CTAB; respirations unlabored; no wheezes, crackles or rhonchi  Extrem: Warm and well-perfused with no clubbing, cyanosis or edema  Neuro:  AAOx3; cooperative and pleasant with no focal deficits    Laboratory:      Most Recent Data:  Lab Results   Component Value Date    WBC 9.96 03/25/2023    HGB 13.4 03/25/2023    HCT 41.4 03/25/2023     03/25/2023    CHOL 169 02/15/2023    TRIG 70 02/15/2023    HDL 55 02/15/2023    ALT 12 03/25/2023    AST 33 03/25/2023     (L) 03/25/2023    K 4.0 03/25/2023    CL 96 03/25/2023    BUN 10 03/25/2023    CO2 24 03/25/2023    TSH 1.012 03/25/2023    HGBA1C 5.3 02/15/2023              CBC:   WBC   Date Value Ref Range Status   03/25/2023 9.96 3.90 - 12.70 K/uL Final     Hemoglobin   Date Value Ref Range Status   03/25/2023 13.4 12.0 - 16.0 g/dL Final     Hematocrit   Date Value Ref Range Status   03/25/2023 41.4 37.0 - 48.5 % Final " "    Platelets   Date Value Ref Range Status   03/25/2023 173 150 - 450 K/uL Final     MCV   Date Value Ref Range Status   03/25/2023 99 (H) 82 - 98 fL Final     RDW   Date Value Ref Range Status   03/25/2023 13.4 11.5 - 14.5 % Final     BMP:   Sodium   Date Value Ref Range Status   03/25/2023 131 (L) 136 - 145 mmol/L Final     Potassium   Date Value Ref Range Status   03/25/2023 4.0 3.5 - 5.1 mmol/L Final     Chloride   Date Value Ref Range Status   03/25/2023 96 95 - 110 mmol/L Final     CO2   Date Value Ref Range Status   03/25/2023 24 23 - 29 mmol/L Final     BUN   Date Value Ref Range Status   03/25/2023 10 6 - 20 mg/dL Final     Creatinine   Date Value Ref Range Status   03/25/2023 0.8 0.5 - 1.4 mg/dL Final     Glucose   Date Value Ref Range Status   03/25/2023 98 70 - 110 mg/dL Final     Calcium   Date Value Ref Range Status   03/25/2023 9.3 8.7 - 10.5 mg/dL Final     LFTs:   Total Protein   Date Value Ref Range Status   03/25/2023 6.6 6.0 - 8.4 g/dL Final     Albumin   Date Value Ref Range Status   03/25/2023 4.1 3.5 - 5.2 g/dL Final     Total Bilirubin   Date Value Ref Range Status   03/25/2023 0.4 0.1 - 1.0 mg/dL Final     Comment:     For infants and newborns, interpretation of results should be based  on gestational age, weight and in agreement with clinical  observations.    Premature Infant recommended reference ranges:  Up to 24 hours.............<8.0 mg/dL  Up to 48 hours............<12.0 mg/dL  3-5 days..................<15.0 mg/dL  6-29 days.................<15.0 mg/dL       AST   Date Value Ref Range Status   03/25/2023 33 10 - 40 U/L Final     Alkaline Phosphatase   Date Value Ref Range Status   03/25/2023 62 55 - 135 U/L Final     ALT   Date Value Ref Range Status   03/25/2023 12 10 - 44 U/L Final     Coags: No results found for: "INR", "PROTIME", "PTT"  FLP:      Lab Results   Component Value Date    CHOL 169 02/15/2023    CHOL 264 (H) 12/14/2010    CHOL 229 (H) 09/22/2010     Lab Results "   Component Value Date    HDL 55 02/15/2023    HDL 38 (L) 12/14/2010    HDL 37 (L) 09/22/2010     Lab Results   Component Value Date    LDLCALC 100.0 02/15/2023    LDLCALC 166.2 (H) 12/14/2010    LDLCALC Invalid, Trig > 400 09/22/2010     Lab Results   Component Value Date    TRIG 70 02/15/2023    TRIG 299 (H) 12/14/2010    TRIG 427 (H) 09/22/2010     Lab Results   Component Value Date    CHOLHDL 32.5 02/15/2023    CHOLHDL 14.4 (L) 12/14/2010    CHOLHDL 16.2 (L) 09/22/2010      DM:      Hemoglobin A1C   Date Value Ref Range Status   02/15/2023 5.3 4.0 - 5.6 % Final     Comment:     ADA Screening Guidelines:  5.7-6.4%  Consistent with prediabetes  >or=6.5%  Consistent with diabetes    High levels of fetal hemoglobin interfere with the HbA1C  assay. Heterozygous hemoglobin variants (HbS, HgC, etc)do  not significantly interfere with this assay.   However, presence of multiple variants may affect accuracy.       LDL Cholesterol   Date Value Ref Range Status   02/15/2023 100.0 63.0 - 159.0 mg/dL Final     Comment:     The National Cholesterol Education Program (NCEP) has set the  following guidelines (reference values) for LDL Cholesterol:  Optimal.......................<130 mg/dL  Borderline High...............130-159 mg/dL  High..........................160-189 mg/dL  Very High.....................>190 mg/dL       Creatinine   Date Value Ref Range Status   03/25/2023 0.8 0.5 - 1.4 mg/dL Final     Thyroid:   TSH   Date Value Ref Range Status   03/25/2023 1.012 0.400 - 4.000 uIU/mL Final     Free T4   Date Value Ref Range Status   02/15/2023 0.99 0.71 - 1.51 ng/dL Final     T4, Total   Date Value Ref Range Status   12/14/2010 6.7 4.5 - 11.5 ug/dl Final     T3, Total   Date Value Ref Range Status   11/09/2009 106 60 - 180 ng/dl Final     Anemia:   Vitamin B-12   Date Value Ref Range Status   02/08/2010 536 210 - 950 pg/ml Final     Folate   Date Value Ref Range Status   02/08/2010 13.3 4.0 - 24.0 ng/ml Final     Cardiac:  "No results found for: "TROPONINI", "CKTOTAL", "CKMB", "BNP"  Urinalysis:   Color, UA   Date Value Ref Range Status   03/25/2023 Colorless (A) Yellow, Straw, Kristin Final     Specific Gravity, UA   Date Value Ref Range Status   03/25/2023 1.000 (A) 1.005 - 1.030 Final     Nitrite, UA   Date Value Ref Range Status   03/25/2023 Negative Negative Final     Ketones, UA   Date Value Ref Range Status   03/25/2023 Negative Negative Final     Urobilinogen, UA   Date Value Ref Range Status   06/22/2009 0.2 0 - 1 EU/DL Final       Other Results:  EKG (my interpretation):     Radiology:  XR CHEST PA AND LATERAL  Narrative: EXAMINATION:  CHEST PA AND LATERAL    CLINICAL HISTORY:  Wheezing    TECHNIQUE:  PA and lateral chest radiograph    COMPARISON:  12/27/2022    FINDINGS:  The cardiac silhouette is within normal limits.   There is no focal consolidation, pneumothorax, or pleural effusion.  Mild levoscoliosis is present.  Impression: No acute intrathoracic abnormality.    Electronically signed by: Michelle Patel  Date:    02/14/2023  Time:    19:31          Assessment/Plan     Emma Morales is a 58 y.o. female with:  1. Skin ulcer, limited to breakdown of skin  Overview:  Left breast x 3 wks prior to presentation in 10/2023    Assessment & Plan:  Avoid scratching   Keep clean and dry, apply anti-bacterial ointment bid and cover   Get MMG now at [facility]      2. Encounter for screening mammogram for malignant neoplasm of breast  -     Mammo Digital Screening Bilat; Future; Expected date: 10/20/2023    3. Osteopenia of neck of right femur  Overview:  DEXA:  10/2021:  osteopenia bilateral femoral neck (left -1.2; right -1.6)    Assessment & Plan:  Cont Vit D with level 55 in 3/2023 and weight bearing 30 min 5 times per week      4. Family history of breast cancer in first degree relative  Overview:  Mom, maternal aunts with breast cancer    Assessment & Plan:  Needs genetic testing since mom, maternal aunts with breast cancer  Pt to " find out if genetic testing is offered at   Will refer to H/O, Genetics, GYN for her care preferably at .    MMG ordered 10/25/2023 - I have given Loren, an  , my cell # to contact me personally if further imaging is recommended by radiology.   Also, I gave her our clinic fax # to fax the results to our clinic.       5. Bipolar 1 disorder  Overview:  Psych dr. Emma Davis    Assessment & Plan:  Cont to f/u with Dr. Davis and must take all psych meds      6. Preventative health care  Assessment & Plan:  Stop smoking  Get RSV, COVID, FLU, Shingrex, Prevnar 20 vaccines      7. Chronic bilateral low back pain without sciatica  Overview:  Pt also with bilateral hip pain    Assessment & Plan:  F/u Dr. Kohler who has ordered MRI of LS  Lose weight to help decrease pressure on hips      8. Tobacco use  Overview:  LDCT 2/2023 stable    Assessment & Plan:  Cont to work on smoking cessation               I spent a total of 35 minutes on the day of the visit.This includes face to face time and non-face to face time preparing to see the patient (eg, review of tests), obtaining and/or reviewing separately obtained history, documenting clinical information in the electronic or other health record, independently interpreting results and communicating results to the patient/family/caregiver, or care coordinator.   Code Status:     Annetta Roe MD

## 2023-11-14 ENCOUNTER — TELEPHONE (OUTPATIENT)
Dept: PRIMARY CARE CLINIC | Facility: CLINIC | Age: 58
End: 2023-11-14
Payer: MEDICARE

## 2023-11-14 NOTE — TELEPHONE ENCOUNTER
----- Message from Delfino Driver sent at 11/14/2023  2:33 PM CST -----  Type:  Patient Returning Call    Who Called:pt  Who Left Message for Patient:  Does the patient know what this is regarding?:mammo results  Would the patient rather a call back or a response via [facility]? Call  Best Call Back Number:[phone]  Additional Information: pt states she would like a call from office in regards to mammo results .

## 2023-11-16 ENCOUNTER — TELEPHONE (OUTPATIENT)
Dept: PRIMARY CARE CLINIC | Facility: CLINIC | Age: 58
End: 2023-11-16
Payer: MEDICARE

## 2023-11-28 ENCOUNTER — TELEPHONE (OUTPATIENT)
Dept: PRIMARY CARE CLINIC | Facility: CLINIC | Age: 58
End: 2023-11-28
Payer: MEDICARE

## 2023-11-28 NOTE — TELEPHONE ENCOUNTER
----- Message from Blake Espana sent at 11/27/2023  4:08 PM CST -----  Contact: self  [phone]  Pt requesting an order for x-ray of head.    Please call and advise

## 2024-01-22 PROBLEM — Z00.00 PREVENTATIVE HEALTH CARE: Status: RESOLVED | Noted: 2023-02-15 | Resolved: 2024-01-22

## 2024-02-05 ENCOUNTER — TELEPHONE (OUTPATIENT)
Dept: PRIMARY CARE CLINIC | Facility: CLINIC | Age: 59
End: 2024-02-05
Payer: MEDICARE

## 2024-02-09 ENCOUNTER — PATIENT OUTREACH (OUTPATIENT)
Dept: ADMINISTRATIVE | Facility: CLINIC | Age: 59
End: 2024-02-09
Payer: MEDICARE

## 2024-02-09 NOTE — PROGRESS NOTES
C3 nurse attempted to contact Emma Morales for a TCC post hospital discharge follow up call. No answer. Left voicemail with callback information. The patient does not have a scheduled HOSFU appointment. Message sent to PCP staff for assistance with scheduling visit with patient.

## 2024-02-10 DIAGNOSIS — E78.5 HYPERLIPIDEMIA, UNSPECIFIED HYPERLIPIDEMIA TYPE: ICD-10-CM

## 2024-02-10 RX ORDER — ROSUVASTATIN CALCIUM 10 MG/1
10 TABLET, COATED ORAL
Qty: 90 TABLET | Refills: 0 | Status: SHIPPED | OUTPATIENT
Start: 2024-02-10 | End: 2024-05-15 | Stop reason: SDUPTHER

## 2024-02-10 NOTE — TELEPHONE ENCOUNTER
Care Due:                  Date            Visit Type   Department     Provider  --------------------------------------------------------------------------------                                EP -                              PRIMARY      [facility]  Last Visit: 10-      CARE ([facility])   Care           Annetta Roe  Next Visit: None Scheduled  None         None Found                                                            Last  Test          Frequency    Reason                     Performed    Due Date  --------------------------------------------------------------------------------    CMP.........  12 months..  icosapent, rosuvastatin..  03- 03-    Lipid Panel.  12 months..  icosapent, rosuvastatin..  02-   02-    TSH.........  12 months..  levothyroxine............  03- 03-    [employer] Embedded Care Due Messages. Reference number: [id number].   2/10/2024 5:43:45 AM CST

## 2024-02-11 NOTE — TELEPHONE ENCOUNTER
Provider Staff:  Action required for this patient    Requires labs      Please see care gap opportunities below in Care Due Message.    Thanks!  [facility]     Appointments      Date Provider   Last Visit   10/20/2023 Annetta Roe MD   Next Visit   Visit date not found Annetta Roe MD     Refill Decision Note   Emma Morales  is requesting a refill authorization.  Brief Assessment and Rationale for Refill:  Approve     Medication Therapy Plan:         Comments:     Note composed:11:59 PM 02/10/2024

## 2024-02-12 DIAGNOSIS — J42 CHRONIC BRONCHITIS, UNSPECIFIED CHRONIC BRONCHITIS TYPE: ICD-10-CM

## 2024-02-12 DIAGNOSIS — J42 CHRONIC BRONCHITIS WITH WHEEZING: ICD-10-CM

## 2024-02-12 DIAGNOSIS — Z72.0 TOBACCO USE: Primary | ICD-10-CM

## 2024-02-12 DIAGNOSIS — E55.9 VITAMIN D DEFICIENCY: ICD-10-CM

## 2024-02-12 RX ORDER — PREDNISONE 20 MG/1
40 TABLET ORAL ONCE
COMMUNITY
Start: 2024-02-07

## 2024-02-12 RX ORDER — OSELTAMIVIR PHOSPHATE 75 MG/1
75 CAPSULE ORAL DAILY
COMMUNITY
Start: 2024-02-07 | End: 2024-02-12 | Stop reason: ALTCHOICE

## 2024-02-12 RX ORDER — GUAIFENESIN AND DEXTROMETHORPHAN HYDROBROMIDE 10; 100 MG/5ML; MG/5ML
10 SYRUP ORAL EVERY 4 HOURS PRN
COMMUNITY
Start: 2024-02-07 | End: 2024-02-17

## 2024-02-12 NOTE — PROGRESS NOTES
"C3 nurse spoke with Emma Morales for a TCC post hospital discharge follow up call. The patient does not have a scheduled HOSFU appointment with Annetta Roe MD (PCP) within 5-7 days post hospital discharge date 2/7/24. C3 nurse was unable to schedule HOSFU appointment in [place], as the patient would like a virtual visit. The patient does not want to go near anyone at this time because she does not want to get anyone sick.     Message sent to PCP staff requesting they contact patient and schedule follow up appointment virtually, or at least have PCP's nurse give her a call?     The patient noted that she is still very weak, and gets fatigued easily. When she sits down after doing minimal movement she has "tingling all over my body, and get very dizzy". She also noted that she had a brain scan in the ER, but did not get the results, therefore she assumes that is a good thing?     She denies fevers, but did note that she is significantly weak and has dizziness with moving around. She has also been having terrible headaches and tinnitus since discharge. She does not have a BP machine at home to monitor BP.      "

## 2024-02-13 RX ORDER — IBUPROFEN 200 MG
1 TABLET ORAL DAILY
Qty: 30 PATCH | Refills: 5 | Status: SHIPPED | OUTPATIENT
Start: 2024-02-13 | End: 2024-02-20 | Stop reason: ALTCHOICE

## 2024-02-13 RX ORDER — ACETAMINOPHEN 500 MG
2000 TABLET ORAL DAILY
Qty: 30 CAPSULE | Refills: 5 | Status: SHIPPED | OUTPATIENT
Start: 2024-02-13 | End: 2024-08-11

## 2024-02-13 NOTE — TELEPHONE ENCOUNTER
Pt is requesting a few medications that are not in her medication list   I have pended them below for review.

## 2024-02-13 NOTE — TELEPHONE ENCOUNTER
No care due was identified.  [facility] Embedded Care Due Messages. Reference number: [id number].   2/12/2024 7:34:52 PM CST

## 2024-02-15 DIAGNOSIS — Z72.0 TOBACCO USE: Primary | ICD-10-CM

## 2024-02-15 RX ORDER — IBUPROFEN 200 MG
TABLET ORAL
Qty: 28 PATCH | Refills: 3 | Status: SHIPPED | OUTPATIENT
Start: 2024-02-15 | End: 2024-02-20 | Stop reason: ALTCHOICE

## 2024-02-15 NOTE — TELEPHONE ENCOUNTER
----- Message from Jessika Alas sent at 2/14/2024  3:29 PM CST -----  Type:  Patient  Advice Call back     Who Called: EMORY BERNAL [[id number]]  Pharmacy name and phone #:  [id number] - [place] - [address] AT [facility]   Phone: [phone]  Fax: [phone]    Would the patient rather a call back or a response via [facility]? Call back   Best Call Back Number: [phone]  Additional Information: Patient indicates she has a order for a smoking cessation patch. Patient indicates she was given the order from a different provider outside of [facility] when she was admitted to [facility]. Patient indicates she would like to see if the provider could call in a script for her to get the patches called in for today 02/14. Patient indicates she would like to get started as soon as possible. Please call back with further assistance.

## 2024-02-15 NOTE — TELEPHONE ENCOUNTER
----- Message from Luba Yung sent at 2/15/2024 12:53 PM CST -----  Type:  Needs Medical Advice    Who Called: pt  Would the patient rather a call back or a response via [facility]? call  Best Call Back Number:  [phone]  Additional Information: pt would like to know if her message was received by office regarding her medication pt has been trying to get medication since last week when released from hospital

## 2024-02-15 NOTE — TELEPHONE ENCOUNTER
Care Due:                  Date            Visit Type   Department     Provider  --------------------------------------------------------------------------------                                EP                               PRIMARY      [facility] Primary  Last Visit: 10-      CARE ([facility])   Care           [facility] Primary  Next Visit: 02-      FOLLOW UP    Care           [facility]                                                            Last  Test          Frequency    Reason                     Performed    Due Date  --------------------------------------------------------------------------------    Vitamin D...  12 months..  cholecalciferol,.........  02- 02-    [facility] Embedded Care Due Messages. Reference number: [id number].   2/15/2024 2:22:58 PM CST

## 2024-02-15 NOTE — TELEPHONE ENCOUNTER
Called pt back and left a VM no answer.     Pt is wanting to be on the smoking cessation program I have pended the ref and nicotine patch for your review and approval.

## 2024-02-20 ENCOUNTER — CLINICAL SUPPORT (OUTPATIENT)
Dept: SMOKING CESSATION | Facility: CLINIC | Age: 59
End: 2024-02-20
Payer: COMMERCIAL

## 2024-02-20 DIAGNOSIS — F17.200 NICOTINE DEPENDENCE: Primary | ICD-10-CM

## 2024-02-20 PROCEDURE — 99999 PR PBB SHADOW E&M-EST. PATIENT-LVL I: CPT | Mod: PBBFAC,,,

## 2024-02-20 PROCEDURE — 99404 PREV MED CNSL INDIV APPRX 60: CPT | Mod: S$GLB,,,

## 2024-02-20 RX ORDER — IBUPROFEN 200 MG
1 TABLET ORAL DAILY
Qty: 14 PATCH | Refills: 0 | Status: SHIPPED | OUTPATIENT
Start: 2024-02-20 | End: 2024-03-07 | Stop reason: SDUPTHER

## 2024-02-20 RX ORDER — DM/P-EPHED/ACETAMINOPH/DOXYLAM 30-7.5/3
2 LIQUID (ML) ORAL
Qty: 144 LOZENGE | Refills: 0 | Status: SHIPPED | OUTPATIENT
Start: 2024-02-20 | End: 2024-05-14

## 2024-02-20 NOTE — PROGRESS NOTES
Patients Quit 1 intake was done by phone this afternoon.Patient recently discharged from hospital. Patient reports smoking 20-30 cigarettes a day. FTND score of 6 indicates a high level of nicotine dependence, TWIN-D score of 13 perceived as no degree of mental distress /possible depression. Patient will begin the prescribed tobacco cessation medication regimen of 21 mg Nicotine patch and 2 mg nicotine lozenges. Discussed and reviewed with the patient regarding NRT's and medication along with behavioral therapy & counseling to assist patient with meeting her goal of being smoke free. Patient is agreeable to participate in bi-weekly.  Patient educated on role & usage possible side effects.  Reviewed behavioral modification strategy of rate reduction and wait time of 15 min prior to smoking. Patient verbalized understanding & willingness to apply. Patient instructed to call TTS with any questions or concerns.

## 2024-02-22 ENCOUNTER — TELEPHONE (OUTPATIENT)
Dept: PRIMARY CARE CLINIC | Facility: CLINIC | Age: 59
End: 2024-02-22
Payer: MEDICARE

## 2024-02-22 NOTE — TELEPHONE ENCOUNTER
----- Message from Mario Burton sent at 2/22/2024  2:31 PM CST -----  Contact: [phone]@patient  Requesting an RX refill or new RX.rosuvastatin (CRESTOR) 10 MG tablet  Is this a refill or new RX: refill  RX name and strength (copy/paste from chart):    Is this a 30 day or 90 day RX:   Pharmacy name and phone #  [facility] - [address]   Phone: [phone]   The doctors have asked that we provide their patients with the following 2 reminders -- prescription refills can take up to 72 hours, and a friendly reminder that in the future you can use your [facility] account to request refills:

## 2024-02-28 ENCOUNTER — TELEPHONE (OUTPATIENT)
Dept: PRIMARY CARE CLINIC | Facility: CLINIC | Age: 59
End: 2024-02-28
Payer: MEDICARE

## 2024-02-28 ENCOUNTER — CLINICAL SUPPORT (OUTPATIENT)
Dept: SMOKING CESSATION | Facility: CLINIC | Age: 59
End: 2024-02-28
Payer: COMMERCIAL

## 2024-02-28 DIAGNOSIS — F17.200 NICOTINE DEPENDENCE: Primary | ICD-10-CM

## 2024-02-28 PROCEDURE — 99999 PR PBB SHADOW E&M-EST. PATIENT-LVL II: CPT | Mod: PBBFAC,,,

## 2024-02-28 PROCEDURE — 99403 PREV MED CNSL INDIV APPRX 45: CPT | Mod: S$GLB,,,

## 2024-02-28 NOTE — PROGRESS NOTES
Individual Follow-Up Form    2/28/2024    Quit Date: TBD    Clinical Status of Patient: Outpatient    Length of Service: 45 minutes    Continuing Medication: yes  Patches or Nicotine Lozenges    Other Medications:      Target Symptoms: Withdrawal and medication side effects. The following were  rated moderate (3) to severe (4) on TCRS:  Moderate (3): urge  Severe (4): none    Comments: Spoke to patient by phone this afternoon in regard to smoking cessation progress update. Patient stated that she had not started patch or lozenges yet. She wanted to finish her last few before she had started. She has 6 cigarettes left.  Discussed and reviewed with the patient regarding NRT's and medication along with behavioral therapy. Reviewed usage possible side effects while wearing patch. Reviewed behavioral modification strategies. Patient instructed to call TTS with any questions or concerns when she starts patch or lozenges. Patient stated that she has several things that will keep her busy, she wants to do some spring cleaning around apartment and in storage unit. The patient will continue with  therapy sessions and medication monitoring by CTTS. Prescribed medication management will be by physician. The patient denies any abnormal behavioral or mental changes at this time.      Diagnosis: F17.200    Next Visit: 2 weeks

## 2024-03-04 ENCOUNTER — TELEPHONE (OUTPATIENT)
Dept: PRIMARY CARE CLINIC | Facility: CLINIC | Age: 59
End: 2024-03-04
Payer: MEDICARE

## 2024-03-05 ENCOUNTER — TELEPHONE (OUTPATIENT)
Dept: PRIMARY CARE CLINIC | Facility: CLINIC | Age: 59
End: 2024-03-05
Payer: MEDICARE

## 2024-03-07 ENCOUNTER — CLINICAL SUPPORT (OUTPATIENT)
Dept: SMOKING CESSATION | Facility: CLINIC | Age: 59
End: 2024-03-07
Payer: COMMERCIAL

## 2024-03-07 DIAGNOSIS — F17.200 NICOTINE DEPENDENCE: Primary | ICD-10-CM

## 2024-03-07 PROCEDURE — 99999 PR PBB SHADOW E&M-EST. PATIENT-LVL II: CPT | Mod: PBBFAC,,,

## 2024-03-07 PROCEDURE — 99403 PREV MED CNSL INDIV APPRX 45: CPT | Mod: S$GLB,,,

## 2024-03-07 RX ORDER — IBUPROFEN 200 MG
1 TABLET ORAL DAILY
Qty: 28 PATCH | Refills: 0 | Status: SHIPPED | OUTPATIENT
Start: 2024-03-07 | End: 2024-04-18 | Stop reason: SDUPTHER

## 2024-03-07 NOTE — PROGRESS NOTES
Individual Follow-Up Form    3/7/2024    Quit Date:     Clinical Status of Patient: Outpatient    Length of Service: 45 minutes    Continuing Medication: yes  Patches or Nicotine Lozenges    Other Medications:      Target Symptoms: Withdrawal and medication side effects. The following were  rated moderate (3) to severe (4) on TCRS:  Moderate (3): urge  Severe (4): none    Comments: Spoke to patient by phone this afternoon. Patient reported that she has quit for 1-2 days but gave in and bought another pack and smoking about 7-8 cpd. Patient did start wearing 21 mg Nicotine patch and not having any side effects from them. Discussed and reviewed lozenge with patient. Reviewed strategies, controlling environment, cues, triggers, new goals set. Introduced high risk situations with preparation interventions, caffeine similarities with withdrawal issues of habit and nicotine, understanding urges, cravings, stress and relaxation. Patient stated that she has not started cleaning her apartment and storage unit to help distract her from smoking. Encouraged patient to set a goal over the next 2 weeks of 5 cpd. The patient remains on the prescribed tobacco cessation medication regimen of 21 mg nicotine patch without any negative side effects at this time. The patient will continue with  therapy sessions and medication monitoring by CTTS. Prescribed medication management will be by physician. The patient denies any abnormal behavioral or mental changes at this time.       Diagnosis: F17.200    Next Visit: 2 weeks

## 2024-03-08 ENCOUNTER — OFFICE VISIT (OUTPATIENT)
Dept: PRIMARY CARE CLINIC | Facility: CLINIC | Age: 59
End: 2024-03-08
Payer: MEDICARE

## 2024-03-08 VITALS
BODY MASS INDEX: 37.48 KG/M2 | DIASTOLIC BLOOD PRESSURE: 68 MMHG | OXYGEN SATURATION: 95 % | HEART RATE: 89 BPM | HEIGHT: 64 IN | WEIGHT: 219.56 LBS | SYSTOLIC BLOOD PRESSURE: 98 MMHG

## 2024-03-08 DIAGNOSIS — E66.01 CLASS 3 SEVERE OBESITY DUE TO EXCESS CALORIES WITH SERIOUS COMORBIDITY AND BODY MASS INDEX (BMI) OF 40.0 TO 44.9 IN ADULT: ICD-10-CM

## 2024-03-08 DIAGNOSIS — Z00.00 PREVENTATIVE HEALTH CARE: Primary | ICD-10-CM

## 2024-03-08 DIAGNOSIS — M85.851 OSTEOPENIA OF NECK OF RIGHT FEMUR: ICD-10-CM

## 2024-03-08 DIAGNOSIS — Z72.0 TOBACCO USE: ICD-10-CM

## 2024-03-08 DIAGNOSIS — J41.0 SIMPLE CHRONIC BRONCHITIS: ICD-10-CM

## 2024-03-08 DIAGNOSIS — F31.9 BIPOLAR 1 DISORDER: ICD-10-CM

## 2024-03-08 DIAGNOSIS — G20.B2 PARKINSON'S DISEASE WITH DYSKINESIA AND FLUCTUATING MANIFESTATIONS: ICD-10-CM

## 2024-03-08 DIAGNOSIS — F51.02 ADJUSTMENT INSOMNIA: ICD-10-CM

## 2024-03-08 DIAGNOSIS — E78.2 MIXED HYPERLIPIDEMIA: ICD-10-CM

## 2024-03-08 DIAGNOSIS — E55.9 VITAMIN D DEFICIENCY: ICD-10-CM

## 2024-03-08 DIAGNOSIS — E03.9 HYPOTHYROIDISM, UNSPECIFIED TYPE: ICD-10-CM

## 2024-03-08 PROBLEM — R73.9 HYPERGLYCEMIA: Status: RESOLVED | Noted: 2023-02-15 | Resolved: 2024-03-08

## 2024-03-08 PROBLEM — L98.499 SKIN ULCER: Status: RESOLVED | Noted: 2023-10-21 | Resolved: 2024-03-08

## 2024-03-08 PROBLEM — E66.813 CLASS 3 SEVERE OBESITY DUE TO EXCESS CALORIES WITH SERIOUS COMORBIDITY AND BODY MASS INDEX (BMI) OF 40.0 TO 44.9 IN ADULT: Status: ACTIVE | Noted: 2024-03-08

## 2024-03-08 PROBLEM — Z59.812 HOUSING INSTABILITY AFTER RECENT HOMELESSNESS: Status: RESOLVED | Noted: 2023-06-15 | Resolved: 2024-03-08

## 2024-03-08 PROCEDURE — 99999 PR PBB SHADOW E&M-EST. PATIENT-LVL V: CPT | Mod: PBBFAC,,, | Performed by: INTERNAL MEDICINE

## 2024-03-08 PROCEDURE — 99396 PREV VISIT EST AGE 40-64: CPT | Mod: S$GLB,,, | Performed by: INTERNAL MEDICINE

## 2024-03-08 RX ORDER — FLUTICASONE PROPIONATE 50 MCG
1 SPRAY, SUSPENSION (ML) NASAL DAILY
COMMUNITY

## 2024-03-08 RX ORDER — UMECLIDINIUM BROMIDE AND VILANTEROL TRIFENATATE 62.5; 25 UG/1; UG/1
1 POWDER RESPIRATORY (INHALATION)
COMMUNITY
Start: 2024-03-05

## 2024-03-08 NOTE — ASSESSMENT & PLAN NOTE
F/u  Dr. Stacie Parsons at  on Anoro and proair; takes tessalon perles 200mg bid;   Stop smoking   -needs LDCT annually 10/2024

## 2024-03-08 NOTE — ASSESSMENT & PLAN NOTE
Order DEXA - printed for pt to take to [facility]  Check Vit D level on Vit D 3000 IU daily   Start weight-bearing exercise 30 min 5 times per week

## 2024-03-08 NOTE — PATIENT INSTRUCTIONS
Get flu, COVID, prevnar 20, Shingrex     Try to wean off trazodone and rely on Depakote to help with sleep    Take Tizandine at bedtime     Refill propanolol -provide dose    Bring DEXA to [facility]     Schedule GYN     Schedule colonoscopy at      Check Vit D - take Vit D 3000 IU daily?    Walk 30 min 5 times per week

## 2024-03-08 NOTE — PROGRESS NOTES
[facility] Internal Medicine/Pediatrics Progress Note      Chief Complaint     Follow-up and Health Maintenance       Subjective:      History of Present Illness:  Emma Morales is a 58 y.o. female     Osteopenia: takes Vit D 3000 IU daily but still limited weight-bearing exercises    Tobacco use: only smokes 5-7 cigs per day on Nicoerm patch and nicorette lozenges; uses smoking cesstion program  at [facility]    SH: now lives in [facility] in [place] blocks from airport     S/p fall in 12/2022  x 2 and injured her lower back down left buttock when moving boxes into her brother's home and injured her mandible, ribs, and knees that were all normal and was given Tramadol #21   -uses walker      Parkinson's: followed by Dr. Kohler and has appt with him tomorrow; on propanolol, tizanidine daily, Sinemet tid and baclofen      Bipolar 1 disorder: taking Seroquel 300mg bid, cymbalta 60mg bid; now 500mg qhs (stopped the 250mg qhs since too sleepy)  DYLAN/panic attacks; takes klonopin 2mg prn to stressful situations.     Insomnia: takes Trazodone 100mg op qhs      HLD: takes crestor 10mg daily      Vitamin D def'y: taking Vitamin D 3000IU  daily      Hypothyroidism: takes Levo 75mcg po daily      Obesity: gaining weight due to sedentary lifestyle edwin when pt was living in her car     Chronic bronchitis: sees Dr. Stacie Parsons at ; takes Anoro and proair; takes tessalon perles 200mg bid; only smoked 5-7 cigs per day  -currently using Nicoderm patches and Nicorette lozenges   -needs LDCT screening     Past Medical History:  Past Medical History:   Diagnosis Date    Abscess of chest wall 06/30/2014    Bipolar 1 disorder     Candidal dermatitis 06/15/2023    Housing instability after recent homelessness 06/15/2023    Hyperglycemia 02/15/2023    Parkinson disease     Spinal injury     Trauma 12/27/2022    Wound discharge 07/15/2014       Past Surgical History:  Past Surgical History:   Procedure Laterality Date    abdominal cyst removal  "Left 2000    ELBOW SURGERY Bilateral 2014\2012    nerves    HYSTERECTOMY  2008    KNEE SURGERY Bilateral     MOUTH SURGERY         Allergies:  Review of patient's allergies indicates:   Allergen Reactions    Nsaids (non-steroidal anti-inflammatory drug) Anaphylaxis    Ibuprofen Hives    Indomethacin Hives    Amitriptyline      will kill her    Cefdinir     Doxycycline     Fluconazole     Gabapentin Other (See Comments)     Pt states "she cant remember"     Lamotrigine Hives    Lurasidone     Methylprednisolone     Naproxen Hives    Ciprofloxacin Palpitations       Home Medications:  Current Outpatient Medications   Medication Sig Dispense Refill    albuterol sulfate (PROAIR RESPICLICK) 90 mcg/actuation inhaler Inhale 2 puffs into the lungs every 4 (four) hours. Rescue 1 each 1    baclofen (LIORESAL) 10 MG tablet Take 10 mg by mouth 3 (three) times daily.      benzonatate (TESSALON) 200 MG capsule Take 1 capsule (200 mg total) by mouth 3 (three) times daily as needed for Cough. 90 capsule 3    carbidopa-levodopa  mg (SINEMET)  mg per tablet   3    carbidopa-levodopa-entacapone -200 mg (STALEVO) -200 mg Tab Take 1 tablet by mouth 3 (three) times daily.      cholecalciferol, vitamin D3, (D3-2000) 50 mcg (2,000 unit) Cap capsule Take 1 capsule (2,000 Units total) by mouth once daily. 30 capsule 5    clonazepam (KLONOPIN) 2 MG Tab Take 8 mg by mouth 2 (two) times daily as needed.   1    dextroamphetamine-amphetamine 10 mg Tab TK 1 T PO  QID      divalproex (DEPAKOTE) 500 MG TbEC   1    DULoxetine (CYMBALTA) 60 MG capsule Take 60 mg by mouth 2 (two) times daily.      glycopyrrolate-formoteroL (BEVESPI AEROSPHERE) 9-4.8 mcg HFAA Inhale 2 puffs into the lungs 2 (two) times daily. Controller 10.7 g 0    icosapent ethyL (VASCEPA) 1 gram Cap Take 2 capsules (2 g total) by mouth 2 (two) times daily. 360 capsule 3    levothyroxine (SYNTHROID) 75 MCG tablet Take 1 tablet (75 mcg total) by mouth before " breakfast. 90 tablet 3    mupirocin (BACTROBAN) 2 % ointment Apply to affected area 3 times daily 22 g 1    nicotine (NICODERM CQ) 21 mg/24 hr Place 1 patch onto the skin once daily. Smoking Cessation [facility] [id number] BIN: [id number] GROUP:  PRXSCT N: [id number] 28 patch 0    nicotine polacrilex 2 MG Lozg Take 1 lozenge (2 mg total) by mouth as needed (in place of cigarettes 6-8 times a day). Smoking Cessation [facility] [id number] BIN: [id number] GROUP:  PRXSCT PCN: [id number] lozenge 0    nystatin (MYCOSTATIN) ointment Apply topically 3 (three) times daily. 30 g 5    predniSONE (DELTASONE) 20 MG tablet Take 40 mg by mouth once.      propranolol (INDERAL) 40 MG tablet   6    quetiapine fumarate (SEROQUEL ORAL) Take by mouth.      rosuvastatin (CRESTOR) 10 MG tablet TAKE 1 TABLET(10 MG) BY MOUTH EVERY DAY 90 tablet 0    tiZANidine (ZANAFLEX) 4 MG tablet Take 4 mg by mouth 3 (three) times daily.      traMADoL (ULTRAM) 50 mg tablet Take 1 tablet (50 mg total) by mouth every 6 (six) hours. 21 tablet 0    trazodone (DESYREL) 100 MG tablet Take 200 mg by mouth every evening.  1    umeclidinium-vilanteroL (ANORO ELLIPTA) 62.5-25 mcg/actuation DsDv Inhale 1 puff into the lungs.      fluticasone propionate (FLONASE) 50 mcg/actuation nasal spray 1 spray by Each Nostril route once daily.       No current facility-administered medications for this visit.        Family History:  Family History   Problem Relation Age of Onset    Hypertension Mother     Heart disease Father        Social History:  Social History     Tobacco Use    Smoking status: Every Day     Current packs/day: 1.50     Average packs/day: 1.5 packs/day for 44.2 years (66.3 ttl pk-yrs)     Types: Cigarettes     Start date: 1/1/1980    Smokeless tobacco: Never    Tobacco comments:     Patches and Lozenges   Substance Use Topics    Alcohol use: No    Drug use: No       Review of Systems:  Pertinent positives and negatives listed in HPI. All other systems are reviewed and  "are negative.    Health Maintaince :   Health Maintenance Topics with due status: Not Due       Topic Last Completion Date    TETANUS VACCINE 10/28/2014    Hemoglobin A1c (Diabetic Prevention Screening) 02/15/2023    Lipid Panel 02/15/2023    Mammogram 10/27/2023    LDCT Lung Screen 03/05/2024           Eye: UTD  [facility]   Dental: UTD     Immunizations:   Tdap: n/a.  Influenza: needs.  Pneumovax: needs   Shingrex x 2: needs  COVID: needs  Hepatitis C:   Cancer Screening:  PAP: has cervix only - refer to GYN   Mammogram: UTD 10/2023   Colonoscopy: needs colonoscopy now at    DEXA:  10/2021 left FN -1.2; right -1.6   LDCT: neg 3/2024     The 10-year ASCVD risk score (Edward PAGAN, et al., 2019) is: 3.6%    Values used to calculate the score:      Age: 58 years      Sex: Female      Is Non- : No      Diabetic: No      Tobacco smoker: Yes      Systolic Blood Pressure: 98 mmHg      Is BP treated: No      HDL Cholesterol: 55 mg/dL      Total Cholesterol: 169 mg/dL      Objective:   BP 98/68 (BP Location: Left arm, Patient Position: Sitting, BP Method: Medium (Manual))   Pulse 89   Ht 5' 4" (1.626 m)   Wt 99.6 kg (219 lb 9.3 oz)   LMP  (LMP Unknown) Comment: hysterectomy  SpO2 95%   BMI 37.69 kg/m²      Body mass index is 37.69 kg/m².       Physical Examination:  General: Alert and awake in no apparent distress  HEENT: Normocephalic and atraumatic; Tms WNL  Eyes:  PERRL; EOMi with anicteric sclera and clear conjunctivae  Mouth:  Oropharynx clear and without exudate; moist mucous membranes  Neck:   Cervical nodes not enlarged; supple; no bruits  Cardio:  Regular rate and rhythm with normal S1 and S2; no murmurs or rubs  Resp:  CTAB; respirations unlabored; no wheezes, crackles or rhonchi  Abdom: Soft, NTND with normoactive bowel sounds; negative HSM  Extrem: Warm and well-perfused with no clubbing, cyanosis or edema  Skin:  No rashes, lesions, or color changes  Pulses:  2+ and " symmetric distally  Neuro:  AAOx3; cooperative and pleasant with no focal deficits    Laboratory:      Most Recent Data:  Lab Results   Component Value Date    WBC 9.96 03/25/2023    HGB 13.4 03/25/2023    HCT 41.4 03/25/2023     03/25/2023    CHOL 169 02/15/2023    TRIG 70 02/15/2023    HDL 55 02/15/2023    ALT 12 03/25/2023    AST 33 03/25/2023     (L) 03/25/2023    K 4.0 03/25/2023    CL 96 03/25/2023    BUN 10 03/25/2023    CO2 24 03/25/2023    TSH 1.012 03/25/2023    HGBA1C 5.3 02/15/2023              CBC:   WBC   Date Value Ref Range Status   03/25/2023 9.96 3.90 - 12.70 K/uL Final     Hemoglobin   Date Value Ref Range Status   03/25/2023 13.4 12.0 - 16.0 g/dL Final     Hematocrit   Date Value Ref Range Status   03/25/2023 41.4 37.0 - 48.5 % Final     Platelets   Date Value Ref Range Status   03/25/2023 173 150 - 450 K/uL Final     MCV   Date Value Ref Range Status   03/25/2023 99 (H) 82 - 98 fL Final     RDW   Date Value Ref Range Status   03/25/2023 13.4 11.5 - 14.5 % Final     BMP:   Sodium   Date Value Ref Range Status   03/25/2023 131 (L) 136 - 145 mmol/L Final     Potassium   Date Value Ref Range Status   03/25/2023 4.0 3.5 - 5.1 mmol/L Final     Chloride   Date Value Ref Range Status   03/25/2023 96 95 - 110 mmol/L Final     CO2   Date Value Ref Range Status   03/25/2023 24 23 - 29 mmol/L Final     BUN   Date Value Ref Range Status   03/25/2023 10 6 - 20 mg/dL Final     Creatinine   Date Value Ref Range Status   03/25/2023 0.8 0.5 - 1.4 mg/dL Final     Glucose   Date Value Ref Range Status   03/25/2023 98 70 - 110 mg/dL Final     Calcium   Date Value Ref Range Status   03/25/2023 9.3 8.7 - 10.5 mg/dL Final     LFTs:   Total Protein   Date Value Ref Range Status   03/25/2023 6.6 6.0 - 8.4 g/dL Final     Albumin   Date Value Ref Range Status   03/25/2023 4.1 3.5 - 5.2 g/dL Final     Total Bilirubin   Date Value Ref Range Status   03/25/2023 0.4 0.1 - 1.0 mg/dL Final     Comment:     For  "infants and newborns, interpretation of results should be based  on gestational age, weight and in agreement with clinical  observations.    Premature Infant recommended reference ranges:  Up to 24 hours.............<8.0 mg/dL  Up to 48 hours............<12.0 mg/dL  3-5 days..................<15.0 mg/dL  6-29 days.................<15.0 mg/dL       AST   Date Value Ref Range Status   03/25/2023 33 10 - 40 U/L Final     Alkaline Phosphatase   Date Value Ref Range Status   03/25/2023 62 55 - 135 U/L Final     ALT   Date Value Ref Range Status   03/25/2023 12 10 - 44 U/L Final     Coags: No results found for: "INR", "PROTIME", "PTT"  FLP:      Lab Results   Component Value Date    CHOL 169 02/15/2023    CHOL 264 (H) 12/14/2010    CHOL 229 (H) 09/22/2010     Lab Results   Component Value Date    HDL 55 02/15/2023    HDL 38 (L) 12/14/2010    HDL 37 (L) 09/22/2010     Lab Results   Component Value Date    LDLCALC 100.0 02/15/2023    LDLCALC 166.2 (H) 12/14/2010    LDLCALC Invalid, Trig > 400 09/22/2010     Lab Results   Component Value Date    TRIG 70 02/15/2023    TRIG 299 (H) 12/14/2010    TRIG 427 (H) 09/22/2010     Lab Results   Component Value Date    CHOLHDL 32.5 02/15/2023    CHOLHDL 14.4 (L) 12/14/2010    CHOLHDL 16.2 (L) 09/22/2010      DM:      Hemoglobin A1C   Date Value Ref Range Status   02/15/2023 5.3 4.0 - 5.6 % Final     Comment:     ADA Screening Guidelines:  5.7-6.4%  Consistent with prediabetes  >or=6.5%  Consistent with diabetes    High levels of fetal hemoglobin interfere with the HbA1C  assay. Heterozygous hemoglobin variants (HbS, HgC, etc)do  not significantly interfere with this assay.   However, presence of multiple variants may affect accuracy.       LDL Cholesterol   Date Value Ref Range Status   02/15/2023 100.0 63.0 - 159.0 mg/dL Final     Comment:     The National Cholesterol Education Program (NCEP) has set the  following guidelines (reference values) for LDL " "Cholesterol:  Optimal.......................<130 mg/dL  Borderline High...............130-159 mg/dL  High..........................160-189 mg/dL  Very High.....................>190 mg/dL       Creatinine   Date Value Ref Range Status   03/25/2023 0.8 0.5 - 1.4 mg/dL Final     Thyroid:   TSH   Date Value Ref Range Status   03/25/2023 1.012 0.400 - 4.000 uIU/mL Final     Free T4   Date Value Ref Range Status   02/15/2023 0.99 0.71 - 1.51 ng/dL Final     T4, Total   Date Value Ref Range Status   12/14/2010 6.7 4.5 - 11.5 ug/dl Final     T3, Total   Date Value Ref Range Status   11/09/2009 106 60 - 180 ng/dl Final     Anemia:   Vitamin B-12   Date Value Ref Range Status   02/08/2010 536 210 - 950 pg/ml Final     Folate   Date Value Ref Range Status   02/08/2010 13.3 4.0 - 24.0 ng/ml Final     Cardiac: No results found for: "TROPONINI", "CKTOTAL", "CKMB", "BNP"  Urinalysis:   Color, UA   Date Value Ref Range Status   03/25/2023 Colorless (A) Yellow, Straw, Kristin Final     Specific Gravity, UA   Date Value Ref Range Status   03/25/2023 1.000 (A) 1.005 - 1.030 Final     Nitrite, UA   Date Value Ref Range Status   03/25/2023 Negative Negative Final     Ketones, UA   Date Value Ref Range Status   03/25/2023 Negative Negative Final     Urobilinogen, UA   Date Value Ref Range Status   06/22/2009 0.2 0 - 1 EU/DL Final       Other Results:  EKG (my interpretation):     Radiology:  XR CHEST PA AND LATERAL  Narrative: EXAMINATION:  CHEST PA AND LATERAL    CLINICAL HISTORY:  Wheezing    TECHNIQUE:  PA and lateral chest radiograph    COMPARISON:  12/27/2022    FINDINGS:  The cardiac silhouette is within normal limits.   There is no focal consolidation, pneumothorax, or pleural effusion.  Mild levoscoliosis is present.  Impression: No acute intrathoracic abnormality.    Electronically signed by: Michelle Patel  Date:    02/14/2023  Time:    19:31          Assessment/Plan     Emma Morales is a 58 y.o. female with:  1. Preventative " health care  Assessment & Plan:  Get COVID and Shingrex at pharmacy  Get flu and prevnar here     Try to wean off trazodone and rely on Depakote to help with sleep    Take Tizandine at bedtime     Refill propanolol -provide dose    Bring DEXA to [facility]     Schedule GYN     Schedule colonoscopy at      Check Vit D - take Vit D 3000 IU daily?    Walk 30 min 5 times per week           Orders:  -     Lipid Panel; Future; Expected date: 03/08/2024  -     Hemoglobin A1C; Future; Expected date: 03/08/2024  -     Urinalysis; Future; Expected date: 03/08/2024  -     Comprehensive Metabolic Panel; Future; Expected date: 03/08/2024  -     CBC Auto Differential; Future; Expected date: 03/08/2024  -     Ambulatory referral/consult to Gynecology; Future; Expected date: 03/08/2024    2. Vitamin D deficiency  Assessment & Plan:  Check Vit D level on Vit D 3000 IU daily     Orders:  -     Vitamin D; Future; Expected date: 03/08/2024    3. Class 3 severe obesity due to excess calories with serious comorbidity and body mass index (BMI) of 40.0 to 44.9 in adult  Assessment & Plan:  Exercise 30 min 5 times per week, decrease portion sizes by 50%; encourage plant-based diet;  drink 6-8 glasses of water daily, avoid fast foods, creamy, rich foods edwin ice cream, cheese creamy salad dressings;avoid eating 3 hours prior to bedtime; consider 8-10 hour intermittent diet; avoid simple sugars edwin white bread, rice, potatoes, noodles/pasta; No sweetened drinks.       4. Bipolar 1 disorder  Overview:  Psych dr. Emma Davis    Assessment & Plan:  Cont Seroquel 300mg bid, cymbalta 60mg bid, depakote 500mg qhs   F/u psychiatry as scheduled      5. Parkinson's disease with dyskinesia and fluctuating manifestations  Assessment & Plan:  Sees Dr. Kohler at least annually including tomorrow  cont propanolol, tizanidine, Sinemet and baclofen       6. Tobacco use  Overview:  LDCT 3/2024 stable    Assessment & Plan:  LDCT annually in 3/2025  Cont  Nicoderm patch  21mg daily and nicorette lozenges 2mg daily;   -cont smoking cesstion program at [facility] to help with smoking cessation        7. Simple chronic bronchitis  Assessment & Plan:    F/u  Dr. Stacie Parsons at  on Anoro and proair; takes tessalon perles 200mg bid;   Stop smoking   -needs LDCT annually 10/2024      8. Osteopenia of neck of right femur  Overview:  DEXA:  10/2021:  osteopenia bilateral femoral neck (left -1.2; right -1.6)    Assessment & Plan:  Order DEXA - printed for pt to take to [facility]  Check Vit D level on Vit D 3000 IU daily   Start weight-bearing exercise 30 min 5 times per week    Orders:  -     DXA Bone Density Axial Skeleton 1 or more sites; Future; Expected date: 03/08/2024    9. Hypothyroidism, unspecified type  Assessment & Plan:  Check TSH on Levo 75mcg po 30 min prior to breakfast    Orders:  -     TSH; Future; Expected date: 03/08/2024  -     T4, FREE; Future; Expected date: 03/08/2024    10. Adjustment insomnia  Assessment & Plan:  Try to wean off trazodone and rely on Depakote to help with sleep      11. Mixed hyperlipidemia  Assessment & Plan:  Check FLP on Crestor 10mg po daily               I spent a total of 42 minutes on the day of the visit.This includes face to face time and non-face to face time preparing to see the patient (eg, review of tests), obtaining and/or reviewing separately obtained history, documenting clinical information in the electronic or other health record, independently interpreting results and communicating results to the patient/family/caregiver, or care coordinator.   Code Status:     Annetta Roe MD

## 2024-03-08 NOTE — ASSESSMENT & PLAN NOTE
Exercise 30 min 5 times per week, decrease portion sizes by 50%; encourage plant-based diet;  drink 6-8 glasses of water daily, avoid fast foods, creamy, rich foods edwin ice cream, cheese creamy salad dressings;avoid eating 3 hours prior to bedtime; consider 8-10 hour intermittent diet; avoid simple sugars edwin white bread, rice, potatoes, noodles/pasta; No sweetened drinks.

## 2024-03-08 NOTE — ASSESSMENT & PLAN NOTE
LDCT annually in 3/2025  Cont Nicoderm patch  21mg daily and nicorette lozenges 2mg daily;   -cont smoking cesstion program at [facility] to help with smoking cessation

## 2024-03-08 NOTE — ASSESSMENT & PLAN NOTE
Sees Dr. Kohler at least annually including tomorrow  cont propanolol, tizanidine, Sinemet and baclofen

## 2024-03-08 NOTE — ASSESSMENT & PLAN NOTE
Get COVID and Shingrex at pharmacy  Get flu and prevnar here     Try to wean off trazodone and rely on Depakote to help with sleep    Take Tizandine at bedtime     Refill propanolol -provide dose    Bring DEXA to [facility]     Schedule GYN     Schedule colonoscopy at      Check Vit D - take Vit D 3000 IU daily?    Walk 30 min 5 times per week

## 2024-03-09 PROBLEM — B37.2 CANDIDAL DERMATITIS: Status: RESOLVED | Noted: 2023-06-15 | Resolved: 2024-03-09

## 2024-03-12 ENCOUNTER — TELEPHONE (OUTPATIENT)
Dept: PRIMARY CARE CLINIC | Facility: CLINIC | Age: 59
End: 2024-03-12
Payer: MEDICARE

## 2024-03-12 NOTE — TELEPHONE ENCOUNTER
----- Message from Jennifer Miller sent at 3/12/2024  4:08 PM CDT -----  Contact: [phone]  Patient is requesting a call back from the staff regarding: If she can get the Flu, Shingle and Pneumonia vaccine at the clinic.     Please call and advise.     Thank you

## 2024-03-13 ENCOUNTER — TELEPHONE (OUTPATIENT)
Dept: PRIMARY CARE CLINIC | Facility: CLINIC | Age: 59
End: 2024-03-13
Payer: MEDICARE

## 2024-03-13 NOTE — TELEPHONE ENCOUNTER
Spoke to pt and advised her that she should be taking propanol the way Dr Kohler prescribed it and for her to call his office and confirm. Pt cancelled her NV today says she will call reschedule

## 2024-03-13 NOTE — TELEPHONE ENCOUNTER
----- Message from Chanelle Escalante sent at 3/13/2024  4:06 PM CDT -----  Contact: Pt [phone]  Would like to receive medical advice.  Would they like a call back or a response via [facility]:  Call Back   Additional information:      Pt is calling because she says during her last appt she was told she should be taking 60mg ofpropranolol (INDERAL) but her bottle is saying it's 120mg.    She'd also like to discuss her levothyroxine (SYNTHROID) 75 MCG tablet

## 2024-03-21 ENCOUNTER — TELEPHONE (OUTPATIENT)
Dept: SMOKING CESSATION | Facility: CLINIC | Age: 59
End: 2024-03-21
Payer: MEDICARE

## 2024-03-21 NOTE — TELEPHONE ENCOUNTER
[facility]- called patient in regards to scheduled telephonic/clinic appointment today. Left message for patient to call back for Smoking Cessation progress update or to reschedule.  Margot Verduzco, [facility]  [phone] or [phone].

## 2024-03-25 ENCOUNTER — CLINICAL SUPPORT (OUTPATIENT)
Dept: SMOKING CESSATION | Facility: CLINIC | Age: 59
End: 2024-03-25
Payer: COMMERCIAL

## 2024-03-25 DIAGNOSIS — F17.200 NICOTINE DEPENDENCE: Primary | ICD-10-CM

## 2024-03-25 PROCEDURE — 99402 PREV MED CNSL INDIV APPRX 30: CPT | Mod: S$GLB,,,

## 2024-03-25 PROCEDURE — 99999 PR PBB SHADOW E&M-EST. PATIENT-LVL II: CPT | Mod: PBBFAC,,,

## 2024-03-25 NOTE — PROGRESS NOTES
"Individual Follow-Up Form    3/25/2024    Quit Date:     Clinical Status of Patient: Outpatient    Length of Service: 30 minutes    Continuing Medication: yes  Patches or Nicotine Lozenges    Other Medications:      Target Symptoms: Withdrawal and medication side effects. The following were  rated moderate (3) to severe (4) on TCRS:  Moderate (3): desire, crave  Severe (4): none    Comments: Spoke to patient this afternoon after she was a "no show for her phone follow up last week. Patient reported that she has been sleeping a lot. She is waiting to talk to PCP about issue. Patient reported that she was doing good and had gotten down to 5-7 cpd. Then she feel back into smoking more and back up to 1/2 ppd. She stated that she did realize that it is a behavior that she needs to change and was smoking at certain times of the day. Encouraged patient to increase her wait times greater than 15 minutes. Suggested that she move her cigarettes away from door since she only smokes outside move them to kitchen draw. Keep setting a goal each day and stay focused on way she is quitting. The patient will continue with  therapy sessions and medication monitoring by CTTS. Prescribed medication management will be by physician. The patient remains on the prescribed tobacco cessation medication regimen of 2 mg nicotine lozenges and 21 mg nicotine patch without any negative side effects at this time.The patient denies any abnormal behavioral or mental changes at this time.       Diagnosis: F17.200    Next Visit: 2 weeks    "

## 2024-04-09 ENCOUNTER — TELEPHONE (OUTPATIENT)
Dept: SMOKING CESSATION | Facility: CLINIC | Age: 59
End: 2024-04-09
Payer: MEDICARE

## 2024-04-09 NOTE — TELEPHONE ENCOUNTER
[facility]- called patient in regards to scheduled telephonic/clinic appointment today. Left message for patient to call back for Smoking Cessation progress update or to reschedule.  Margot Verduzco, [facility]  [phone] or [phone].

## 2024-04-12 ENCOUNTER — TELEPHONE (OUTPATIENT)
Dept: PRIMARY CARE CLINIC | Facility: CLINIC | Age: 59
End: 2024-04-12
Payer: MEDICARE

## 2024-04-18 ENCOUNTER — CLINICAL SUPPORT (OUTPATIENT)
Dept: SMOKING CESSATION | Facility: CLINIC | Age: 59
End: 2024-04-18
Payer: COMMERCIAL

## 2024-04-18 DIAGNOSIS — F17.200 NICOTINE DEPENDENCE: Primary | ICD-10-CM

## 2024-04-18 PROCEDURE — 99999 PR PBB SHADOW E&M-EST. PATIENT-LVL II: CPT | Mod: PBBFAC,,,

## 2024-04-18 PROCEDURE — 99402 PREV MED CNSL INDIV APPRX 30: CPT | Mod: S$GLB,,,

## 2024-04-18 RX ORDER — IBUPROFEN 200 MG
1 TABLET ORAL DAILY
Qty: 28 PATCH | Refills: 0 | Status: SHIPPED | OUTPATIENT
Start: 2024-04-18 | End: 2024-05-28 | Stop reason: SDUPTHER

## 2024-04-18 NOTE — PROGRESS NOTES
Individual Follow-Up Form    4/18/2024    Quit Date:     Clinical Status of Patient: Outpatient    Length of Service: 30 minutes    Continuing Medication: yes  Patches or Nicotine Lozenges    Other Medications:      Target Symptoms: Withdrawal and medication side effects. The following were  rated moderate (3) to severe (4) on TCRS:  Moderate (3): desire, crave, stress  Severe (4): none    Comments: Spoke to patient this morning in regard to smoking cessation progress update. Patient reported that she is smoking 7-10 cpd day. She stated that there was a death in the family and has been a little stressful the last few days. Patient wants to get back on track and use lozenges more through out the day in place of the cigarettes. Completion of TCRS (Tobacco Cessation Rating Scale) reviewed strategies, cues, and triggers. Introduced the negative impact of tobacco on health, the health advantages of discontinuing the use of tobacco, time line improved health changes after a quit, withdrawal issues to expect from nicotine and habit, and ways to achieve the goal of a quit. Patient was given a goal of 5 cpd over the next 2 weeks. The patient will continue with  therapy sessions and medication monitoring by CTTS. Prescribed medication management will be by physician. The patient remains on the prescribed tobacco cessation medication regimen of 21 mg without any negative side effects at this time. The patient denies any abnormal behavioral or mental changes at this time.        Diagnosis: F17.200    Next Visit: 2 weeks

## 2024-04-19 DIAGNOSIS — E03.9 HYPOTHYROIDISM, UNSPECIFIED TYPE: ICD-10-CM

## 2024-04-19 RX ORDER — LEVOTHYROXINE SODIUM 75 UG/1
75 TABLET ORAL
Qty: 90 TABLET | Refills: 3 | Status: SHIPPED | OUTPATIENT
Start: 2024-04-19

## 2024-04-19 NOTE — TELEPHONE ENCOUNTER
Care Due:                  Date            Visit Type   Department     Provider  --------------------------------------------------------------------------------                                [facility]  Last Visit: 03-      FOLLOW UP    Care           Annetta Roe  Next Visit: None Scheduled  None         None Found                                                            Last  Test          Frequency    Reason                     Performed    Due Date  --------------------------------------------------------------------------------    CMP.........  12 months..  cholecalciferol,,          03- 03-                             icosapent, rosuvastatin..    Lipid Panel.  12 months..  icosapent, rosuvastatin..  02-   02-    TSH.........  12 months..  levothyroxine............  03- 03-    Vitamin D...  12 months..  cholecalciferol,.........  02- 02-    Health [facility] Embedded Care Due Messages. Reference number: [id number].   4/19/2024 7:22:39 AM CDT

## 2024-04-20 NOTE — TELEPHONE ENCOUNTER
Refill Routing Note   Medication(s) are not appropriate for processing by [facility] for the following reason(s):        Required labs outdated    [facility] action(s):  Defer   Requires labs : Yes             Appointments  past 12m or future 3m with PCP    Date Provider   Last Visit   3/8/2024 Annetta Roe MD   Next Visit   Visit date not found Annetta Roe MD   ED visits in past 90 days: 0        Note composed:8:10 PM 04/19/2024

## 2024-05-01 ENCOUNTER — CLINICAL SUPPORT (OUTPATIENT)
Dept: SMOKING CESSATION | Facility: CLINIC | Age: 59
End: 2024-05-01
Payer: COMMERCIAL

## 2024-05-01 ENCOUNTER — TELEPHONE (OUTPATIENT)
Dept: SMOKING CESSATION | Facility: CLINIC | Age: 59
End: 2024-05-01
Payer: MEDICARE

## 2024-05-01 DIAGNOSIS — F17.200 NICOTINE DEPENDENCE: Primary | ICD-10-CM

## 2024-05-01 PROCEDURE — 99999 PR PBB SHADOW E&M-EST. PATIENT-LVL II: CPT | Mod: PBBFAC,,,

## 2024-05-01 PROCEDURE — 99402 PREV MED CNSL INDIV APPRX 30: CPT | Mod: S$GLB,,,

## 2024-05-01 NOTE — PROGRESS NOTES
Individual Follow-Up Form    5/1/2024    Quit Date:     Clinical Status of Patient: Outpatient    Length of Service: 30 minutes    Continuing Medication: yes  Patches or Nicotine Lozenges    Other Medications:      Target Symptoms: Withdrawal and medication side effects. The following were  rated moderate (3) to severe (4) on TCRS:  Moderate (3): urge  Severe (4): none    Comments: Spoke to patient this morning in regards to smoking sensation. Patient continues to smoke 7 to 10 cigarettes. She says she has good days and bad days where there may be less than that. Patient tries to stay busy. Has been trying to make her apartment more efficient so that takes up some of her time and she eliminates cigarettes at that time. Suggested that she try to leave cigarettes at home when she goes off for short periods of time to visit friends. Encourage patient to set a goal each week and to decrease cigarettes 5-6 pd. The patient will continue with  therapy sessions and medication monitoring by CTTS. Prescribed medication management will be by physician. The patient remains on the prescribed tobacco cessation medication regimen of 21 mg Nicotine patch without any negative side effects at this time. The patient denies any abnormal behavioral or mental changes at this time.       Diagnosis: F17.200    Next Visit: 2 weeks

## 2024-05-07 ENCOUNTER — TELEPHONE (OUTPATIENT)
Dept: PRIMARY CARE CLINIC | Facility: CLINIC | Age: 59
End: 2024-05-07
Payer: MEDICARE

## 2024-05-07 NOTE — TELEPHONE ENCOUNTER
----- Message from Jennifer Miller sent at 5/6/2024  3:20 PM CDT -----  Contact: [phone]  [email or web address]     Patient is calling for Medical Advice regarding:Symptom: Weakness  Outcome: Instruct patient to Call 911 NOW! Patient  rejected and is requesting to send a message to the provider.   Reason: Hard to wake up    The caller rejected this outcome      How long has patient had these symptoms:few days     Pharmacy name and phone#:    Would like response via mychart:  phone     Comments:

## 2024-05-09 ENCOUNTER — OFFICE VISIT (OUTPATIENT)
Dept: URGENT CARE | Facility: CLINIC | Age: 59
End: 2024-05-09
Payer: MEDICARE

## 2024-05-09 ENCOUNTER — TELEPHONE (OUTPATIENT)
Dept: PRIMARY CARE CLINIC | Facility: CLINIC | Age: 59
End: 2024-05-09
Payer: MEDICARE

## 2024-05-09 VITALS
BODY MASS INDEX: 37.59 KG/M2 | DIASTOLIC BLOOD PRESSURE: 80 MMHG | OXYGEN SATURATION: 94 % | WEIGHT: 219 LBS | RESPIRATION RATE: 16 BRPM | HEART RATE: 86 BPM | SYSTOLIC BLOOD PRESSURE: 114 MMHG | TEMPERATURE: 98 F

## 2024-05-09 DIAGNOSIS — R40.0 DAYTIME SLEEPINESS: Primary | ICD-10-CM

## 2024-05-09 DIAGNOSIS — F43.9 STRESS AT HOME: ICD-10-CM

## 2024-05-09 PROCEDURE — 99213 OFFICE O/P EST LOW 20 MIN: CPT | Mod: S$GLB,,,

## 2024-05-09 NOTE — TELEPHONE ENCOUNTER
Spoke to pt she's state she sleeping a lot and tired can't get things done. No energy tried to schedule an appt but she advised she will go to urgent care.

## 2024-05-10 ENCOUNTER — TELEPHONE (OUTPATIENT)
Dept: PRIMARY CARE CLINIC | Facility: CLINIC | Age: 59
End: 2024-05-10
Payer: MEDICARE

## 2024-05-10 NOTE — PROGRESS NOTES
"Subjective:      Patient ID: Emma Morales is a 58 y.o. female.    Vitals:  weight is 99.3 kg (219 lb). Her oral temperature is 98.4 °F (36.9 °C). Her blood pressure is 114/80 and her pulse is 86. Her respiration is 16 and oxygen saturation is 94% (abnormal).     Chief Complaint: Constantly Exhausted and Sleeping alot     This is a 58 y.o. female who presents today with a chief complaint of constantly being exhausted and sleepy all the time x 2 months  Home Tx: Cut back on certain medications    Provider note starts below:  Patient presents to clinic for evaluation of excessive sleepiness.  States she is sleeping for approximately 12 hours each night and still feeling tired throughout the day.  States she will fall asleep while sitting at the table, watching TV, or doing anything while sitting still.  States she tries to keep herself busy to keep herself awake however she still feels tired all throughout the day.  This is an ongoing issue.  Patient states she is felt this way for several months, but after she had the flu 2 months ago her symptoms worsened.  Patient and her primary care doctor have tried medication adjustments to see if it was causing symptoms.  She has decreased trazodone from 2 tablets to 1, but states she wants to go back to 2 due to nightmares.  She has started taking 500 mg of Depakote instead of 750 mg.  She has also started taking Klonopin only as needed instead of every morning which she used to.  Patient states none of these medication adjustments have helped her symptoms.  She reports having a sleep study done 2 weeks ago for this issue, but her neurologist has yet to discuss results with her.  Her neurologist is currently out of town and unable to see patient.  She contacted her primary care doctor today for advice and states she was told to come to urgent care.  Patient states "I just do not feel right".  When asked about patient's mood lately, patient states she has been very busy from " helping out friends.  States she does not have anyone to talk to about concerns.  Admits she used to see a therapist for the past 25 years, but does not currently have a therapist.  Agrees that she should probably start therapy again.  Patient has no other complaints today.       Other  The current episode started more than 1 month ago. The problem has been gradually worsening. Associated symptoms include fatigue. Pertinent negatives include no abdominal pain, chest pain, chills, congestion, coughing, fever, headaches, myalgias, nausea, rash or vomiting.       Constitution: Positive for fatigue. Negative for activity change, appetite change, chills and fever.   HENT:  Negative for ear pain and congestion.    Cardiovascular:  Negative for chest pain and palpitations.   Eyes:  Negative for double vision and blurred vision.   Respiratory:  Negative for chest tightness and cough.    Gastrointestinal:  Negative for abdominal pain, nausea and vomiting.   Musculoskeletal:  Negative for muscle cramps and muscle ache.   Skin:  Negative for pale and rash.   Neurological:  Negative for dizziness, light-headedness, headaches, disorientation and altered mental status.   Psychiatric/Behavioral:  Positive for sleep disturbance (increased sleeping) and depression. Negative for altered mental status, disorientation, confusion, agitation, nervous/anxious, hallucinations, homicidal ideas, suicidal ideas and self-injury. The patient is not nervous/anxious.       Objective:     Physical Exam   Constitutional: She is oriented to person, place, and time.  Non-toxic appearance. She does not appear ill. No distress.   HENT:   Head: Normocephalic and atraumatic.   Ears:   Right Ear: Tympanic membrane normal.   Left Ear: Tympanic membrane normal.   Nose: Nose normal.   Mouth/Throat: Mucous membranes are moist. Oropharynx is clear.   Eyes: Conjunctivae are normal. Extraocular movement intact   Neck: Neck supple.   Cardiovascular: Normal rate,  regular rhythm, normal heart sounds and normal pulses.   Pulmonary/Chest: Effort normal and breath sounds normal. No stridor. No respiratory distress. She has no wheezes. She has no rhonchi. She has no rales. She exhibits no tenderness.   Abdominal: Normal appearance.   Musculoskeletal: Normal range of motion.         General: Normal range of motion.   Neurological: She is alert, oriented to person, place, and time and at baseline.   Skin: Skin is warm and dry.   Psychiatric: Her behavior is normal. Mood, judgment and thought content normal.   Nursing note and vitals reviewed.      Assessment:     1. Daytime sleepiness    2. Stress at home        Plan:     Daytime sleepiness  -     Ambulatory referral/consult to Psychiatry    Stress at home  -     Ambulatory referral/consult to Psychiatry            Patient Instructions   A referral has been placed for you to be seen with a therapist to discuss sleep problems and other issues that may be causing stress. You should receive a call from [facility] within the next 1-3 days to set up an appointment. If you do not receive a call, you may call our Referral Hotline at [phone] to make an appointment.    Please follow up with your PCP Dr. Roe within the next week for further evaluation of your chronic daytime sleepiness.    Should you have any new or worsening symptoms after leaving urgent care, it is recommended that you seek evaluation at the emergency department. Some signs/symptoms would be    What causes daytime sleepiness? -- Daytime sleepiness can be caused by:  Not having good sleep habits - For example, not having enough time to sleep at night or not having a regular sleep schedule.  A sleep disorder, such as:  Sleep apnea - People with this condition stop breathing for short periods during sleep.  Narcolepsy - People with this condition are very sleepy in the daytime and sometimes fall asleep suddenly during normal activities.  Insomnia - People with this  "condition have trouble falling or staying asleep.  A medical problem, such as:   Hypothyroidism - This is the medical term for when a person does not make enough thyroid hormone. This hormone controls how your body uses and stores energy.  Depression - People with this condition feel sad or down a lot of the time. They often also have trouble working or doing everyday tasks.  Things that disturb your sleep, such as:  Sounds - For example, if you have a new baby, they might cry and wake you up at night.  Health conditions, such as restless legs syndrome or nighttime leg cramps.  Schedule changes that affect sleep - This might include working a night shift or traveling to another time zone.  Medicines - Certain medicines can cause daytime sleepiness.    Is there anything I can do on my own to feel better? -- That depends on the cause of your daytime sleepiness. But you can try having good sleep habits. This means that you:  Go to bed and get up at the same time every day.  Have drinks with caffeine in them only in the morning (these include coffee and tea).  Avoid alcohol.  Avoid smoking, especially in the evening.  Lose weight if you are overweight.  Exercise several days a week, but not right before bed.  Avoid looking at screens that give off light before bed. These include phones, tablets, "e-reader" devices, and television. Looking at screens just before going to bed can make it harder to fall asleep. It might also make your sleep less restful.    Should I see a doctor or nurse? -- See a doctor or nurse if:  You are often very sleepy in the daytime.  You fall asleep in the middle of normal activities.  You fall asleep in a dangerous situation, such as while driving.  You see or hear things that are not really there.  When you wake up, you can't move right away.  Your muscles feel weak if you laugh or get excited or angry.    Will I need tests? -- Your doctor or nurse will decide which tests you should have. There " "are many different tests, but you might not need any. It depends on your age, other symptoms, and individual situation.  A "sleep study" is the most common test doctors use to find the cause of daytime sleepiness. For this test, you spend the night in a sleep lab at a hospital or doctor's office. You are hooked up to different machines that monitor your heart rate, breathing, and other body functions. The results of the test tell your doctor or nurse if you have a sleep disorder.  Your doctor or nurse might also ask you to keep a daily log for 1 to 2 weeks, where you keep track of how you sleep each night.    How is daytime sleepiness treated? -- That depends on what is causing your daytime sleepiness. Treatments can include:  Lifestyle changes - These can include changing your work schedule, taking naps, losing weight, or avoiding caffeine and alcohol.  Devices you wear at night - These can help people with sleep apnea.  Medicines - There are medicines that can help you stay awake in the daytime or sleep better at night.  Surgery - A few people with sleep apnea have surgery to treat it. But most people don't need surgery for daytime sleepiness.    If you have a medical condition that is causing your sleepiness, you might need treatment for that, too.      "

## 2024-05-10 NOTE — TELEPHONE ENCOUNTER
----- Message from Angeles Santana sent at 5/10/2024  8:16 AM CDT -----  Contact: Pt  [phone]  Pt called in regards to she was seen  at the urgent care on yesterday for exhaustion and was told to f/u with her pcp on today or the next few day I was not able to get a soon sebastian pt states she needs to see Dr Roe please advise.

## 2024-05-10 NOTE — TELEPHONE ENCOUNTER
----- Message from Caitlin Ibanez sent at 5/10/2024  2:37 PM CDT -----  Contact: [phone] Patient  Caller is requesting an earlier appointment then we can schedule.  Caller is requesting a message be sent to the provider.  If this is for urgent care symptoms, did you offer other providers at this location, providers at other locations, or [facility]? (yes, no, n/a):  no  If this is for the patients physical, did you offer to schedule next available and put on wait list, or to see NP or PA for their physical?  (yes, no, n/a):  No  When is the next available appointment with their provider: 05/16/24  Reason for the appointment:  [facility] follow up for exhaustion  Patient preference of timeframe to be scheduled:  today-pt states she needs to speak to Vivian  Would the patient like a call back, or a response through their [facility] portal?:   call back  Comments:   Pt states [facility] was not able to do anything for her

## 2024-05-10 NOTE — PATIENT INSTRUCTIONS
A referral has been placed for you to be seen with a therapist to discuss sleep problems and other issues that may be causing stress. You should receive a call from [facility] within the next 1-3 days to set up an appointment. If you do not receive a call, you may call our Referral Hotline at [phone] to make an appointment.    Please follow up with your PCP Dr. Roe within the next week for further evaluation of your chronic daytime sleepiness.    Should you have any new or worsening symptoms after leaving urgent care, it is recommended that you seek evaluation at the emergency department. Some signs/symptoms would be    What causes daytime sleepiness? -- Daytime sleepiness can be caused by:  Not having good sleep habits - For example, not having enough time to sleep at night or not having a regular sleep schedule.  A sleep disorder, such as:  Sleep apnea - People with this condition stop breathing for short periods during sleep.  Narcolepsy - People with this condition are very sleepy in the daytime and sometimes fall asleep suddenly during normal activities.  Insomnia - People with this condition have trouble falling or staying asleep.  A medical problem, such as:   Hypothyroidism - This is the medical term for when a person does not make enough thyroid hormone. This hormone controls how your body uses and stores energy.  Depression - People with this condition feel sad or down a lot of the time. They often also have trouble working or doing everyday tasks.  Things that disturb your sleep, such as:  Sounds - For example, if you have a new baby, they might cry and wake you up at night.  Health conditions, such as restless legs syndrome or nighttime leg cramps.  Schedule changes that affect sleep - This might include working a night shift or traveling to another time zone.  Medicines - Certain medicines can cause daytime sleepiness.    Is there anything I can do on my own to feel better? -- That depends on the  "cause of your daytime sleepiness. But you can try having good sleep habits. This means that you:  Go to bed and get up at the same time every day.  Have drinks with caffeine in them only in the morning (these include coffee and tea).  Avoid alcohol.  Avoid smoking, especially in the evening.  Lose weight if you are overweight.  Exercise several days a week, but not right before bed.  Avoid looking at screens that give off light before bed. These include phones, tablets, "e-reader" devices, and television. Looking at screens just before going to bed can make it harder to fall asleep. It might also make your sleep less restful.    Should I see a doctor or nurse? -- See a doctor or nurse if:  You are often very sleepy in the daytime.  You fall asleep in the middle of normal activities.  You fall asleep in a dangerous situation, such as while driving.  You see or hear things that are not really there.  When you wake up, you can't move right away.  Your muscles feel weak if you laugh or get excited or angry.    Will I need tests? -- Your doctor or nurse will decide which tests you should have. There are many different tests, but you might not need any. It depends on your age, other symptoms, and individual situation.  A "sleep study" is the most common test doctors use to find the cause of daytime sleepiness. For this test, you spend the night in a sleep lab at a hospital or doctor's office. You are hooked up to different machines that monitor your heart rate, breathing, and other body functions. The results of the test tell your doctor or nurse if you have a sleep disorder.  Your doctor or nurse might also ask you to keep a daily log for 1 to 2 weeks, where you keep track of how you sleep each night.    How is daytime sleepiness treated? -- That depends on what is causing your daytime sleepiness. Treatments can include:  Lifestyle changes - These can include changing your work schedule, taking naps, losing weight, or " avoiding caffeine and alcohol.  Devices you wear at night - These can help people with sleep apnea.  Medicines - There are medicines that can help you stay awake in the daytime or sleep better at night.  Surgery - A few people with sleep apnea have surgery to treat it. But most people don't need surgery for daytime sleepiness.    If you have a medical condition that is causing your sleepiness, you might need treatment for that, too.   EOAE (evoked otoacoustic emission)

## 2024-05-13 ENCOUNTER — TELEPHONE (OUTPATIENT)
Dept: PRIMARY CARE CLINIC | Facility: CLINIC | Age: 59
End: 2024-05-13
Payer: MEDICARE

## 2024-05-13 ENCOUNTER — PATIENT MESSAGE (OUTPATIENT)
Dept: PRIMARY CARE CLINIC | Facility: CLINIC | Age: 59
End: 2024-05-13
Payer: MEDICARE

## 2024-05-13 ENCOUNTER — PATIENT OUTREACH (OUTPATIENT)
Dept: ADMINISTRATIVE | Facility: HOSPITAL | Age: 59
End: 2024-05-13
Payer: MEDICARE

## 2024-05-14 ENCOUNTER — TELEPHONE (OUTPATIENT)
Dept: PRIMARY CARE CLINIC | Facility: CLINIC | Age: 59
End: 2024-05-14
Payer: MEDICARE

## 2024-05-14 NOTE — TELEPHONE ENCOUNTER
----- Message from uYlia Pride sent at 5/14/2024 12:52 PM CDT -----  Contact: [phone] pt  Type: Returning a call    Who left a message?nurse    When did the practice call?today    Comments:

## 2024-05-15 DIAGNOSIS — E78.5 HYPERLIPIDEMIA, UNSPECIFIED HYPERLIPIDEMIA TYPE: ICD-10-CM

## 2024-05-15 DIAGNOSIS — E78.2 MIXED HYPERLIPIDEMIA: ICD-10-CM

## 2024-05-15 RX ORDER — ROSUVASTATIN CALCIUM 10 MG/1
10 TABLET, COATED ORAL DAILY
Qty: 90 TABLET | Refills: 0 | Status: SHIPPED | OUTPATIENT
Start: 2024-05-15

## 2024-05-15 RX ORDER — ICOSAPENT ETHYL 1 G/1
2 CAPSULE ORAL 2 TIMES DAILY
Qty: 120 CAPSULE | Refills: 5 | Status: SHIPPED | OUTPATIENT
Start: 2024-05-15 | End: 2024-11-11

## 2024-05-15 NOTE — TELEPHONE ENCOUNTER
----- Message from Kristinjayson Parra sent at 5/15/2024  1:36 PM CDT -----  Contact: Patient [phone]  Requesting an RX refill or new RX.  Is this a refill or new RX:   RX name and strength rosuvastatin (CRESTOR) 10 MG tablet  Is this a 30 day or 90 day RX: 90  Pharmacy name and phone #   [facility] - [place], [place] - [address]  [address] [place] [address]  Phone: [phone] Fax: [phone]      The doctors have asked that we provide their patients with the following 2 reminders -- prescription refills can take up to 72 hours, and a friendly reminder that in the future you can use your [facility] account to request refills: yes      Requesting an RX refill or new RX.  Is this a refill or new RX:   RX name and strength icosapent ethyL (VASCEPA) 1 gram Cap   Is this a 30 day or 90 day RX: 90  Pharmacy name and phone #  [facility] - [place], [place] - [address]  [address]  [place] [address]  Phone: [phone] Fax: [phone]    The doctors have asked that we provide their patients with the following 2 reminders -- prescription refills can take up to 72 hours, and a friendly reminder that in the future you can use your [facility] account to request refills: yes      Comment: Patient would like Istau to call her as well

## 2024-05-15 NOTE — TELEPHONE ENCOUNTER
No care due was identified.  Health [facility] Embedded Care Due Messages. Reference number: [id number].   5/15/2024 3:55:07 PM CDT

## 2024-05-17 ENCOUNTER — NURSE TRIAGE (OUTPATIENT)
Dept: ADMINISTRATIVE | Facility: CLINIC | Age: 59
End: 2024-05-17
Payer: MEDICARE

## 2024-05-17 NOTE — TELEPHONE ENCOUNTER
No care due was identified.  [facility] Embedded Care Due Messages. Reference number: [id number].   5/17/2024 6:34:36 PM CDT

## 2024-05-18 NOTE — TELEPHONE ENCOUNTER
Call x 1 LVM, call x 2 NA    Reason for Disposition   Second attempt to contact caller AND no contact made. Phone number verified.    Additional Information   Negative: Caller has already spoken with the PCP and has no further questions.   Negative: Caller has already spoken with another triager and has no further questions.   Negative: Caller has already spoken with another triager or PCP AND has further questions AND triager able to answer questions.   Negative: Busy signal.  First attempt to contact caller.  Follow-up call scheduled within 15 minutes.   Negative: No answer.  First attempt to contact caller.  Follow-up call scheduled within 15 minutes.   Negative: Message left on identified voice mail   Negative: Message left on unidentified voice mail.  Phone number verified.   Negative: Message left with person in household.   Negative: Wrong number reached.  Phone number verified.   Negative: Third attempt to contact caller AND no contact made. Phone number verified.   Negative: Cell phone out of range.  Phone number verified.   Negative: Pager number given.  Answering service notified.   Negative: Patient already left for the hospital/clinic.   Negative: Caller has cancelled the call before the first contact   Negative: Unable to complete triage due to phone connection issues   Negative: NON-URGENT call redirected to PCP's office because it is open    Protocols used: No Contact or Duplicate Contact Call-A-

## 2024-05-20 ENCOUNTER — TELEPHONE (OUTPATIENT)
Dept: SMOKING CESSATION | Facility: CLINIC | Age: 59
End: 2024-05-20
Payer: MEDICARE

## 2024-05-20 RX ORDER — METHOCARBAMOL 500 MG/1
TABLET, FILM COATED ORAL
Qty: 30 TABLET | Refills: 1 | Status: SHIPPED | OUTPATIENT
Start: 2024-05-20

## 2024-05-20 NOTE — TELEPHONE ENCOUNTER
[facility]ation [facility]- called patient in regards to scheduled telephonic/clinic appointment today. Left message for patient to call back for Smoking Cessation progress update or to reschedule.  Margot Verduzco, [facility]  [phone] or [phone].

## 2024-05-27 ENCOUNTER — TELEPHONE (OUTPATIENT)
Dept: SMOKING CESSATION | Facility: CLINIC | Age: 59
End: 2024-05-27
Payer: MEDICARE

## 2024-05-27 NOTE — TELEPHONE ENCOUNTER
Smoking Cessation Clinic- called patient in regards to smoking cessation progress update. Left message for patient to call back for Smoking Cessation progress update or to reschedule.  Margot Verduzco, [facility]  [phone] or [phone].

## 2024-05-28 ENCOUNTER — CLINICAL SUPPORT (OUTPATIENT)
Dept: SMOKING CESSATION | Facility: CLINIC | Age: 59
End: 2024-05-28
Payer: COMMERCIAL

## 2024-05-28 DIAGNOSIS — F17.200 NICOTINE DEPENDENCE: Primary | ICD-10-CM

## 2024-05-28 PROCEDURE — 99407 BEHAV CHNG SMOKING > 10 MIN: CPT | Mod: S$GLB,,,

## 2024-05-28 PROCEDURE — 99999 PR PBB SHADOW E&M-EST. PATIENT-LVL I: CPT | Mod: PBBFAC,,,

## 2024-05-28 RX ORDER — IBUPROFEN 200 MG
1 TABLET ORAL DAILY
Qty: 28 PATCH | Refills: 0 | Status: SHIPPED | OUTPATIENT
Start: 2024-05-28

## 2024-06-10 ENCOUNTER — CLINICAL SUPPORT (OUTPATIENT)
Dept: SMOKING CESSATION | Facility: CLINIC | Age: 59
End: 2024-06-10
Payer: COMMERCIAL

## 2024-06-10 DIAGNOSIS — F17.200 NICOTINE DEPENDENCE: Primary | ICD-10-CM

## 2024-06-10 PROBLEM — Z00.00 PREVENTATIVE HEALTH CARE: Status: RESOLVED | Noted: 2023-02-15 | Resolved: 2024-06-10

## 2024-06-10 PROCEDURE — 99999 PR PBB SHADOW E&M-EST. PATIENT-LVL II: CPT | Mod: PBBFAC,,,

## 2024-06-10 PROCEDURE — 99401 PREV MED CNSL INDIV APPRX 15: CPT | Mod: S$GLB,,,

## 2024-06-10 NOTE — PROGRESS NOTES
Individual Follow-Up Form    6/10/2024    Quit Date:     Clinical Status of Patient: Outpatient    Length of Service: 15 minutes    Continuing Medication: yes  Patches or Nicotine Lozenges    Other Medications:      Target Symptoms: Withdrawal and medication side effects. The following were  rated moderate (3) to severe (4) on TCRS:  Moderate (3): urge  Severe (4): none    Comments: Spoke to patient this afternoon in regard to smoking cessation progress update. Patient was sleeping at the time of call. Patient keeping cigarettes in the nicotine patch box, to help her stay focused. Patient plans on not buying anymore after this pack. She has tired this several times but continues to buy them 1 pack at a time. Encouraged patient to stay focused and stick with her quit date. The patient will continue with  therapy sessions and medication monitoring by CTTS. Prescribed medication management will be by physician. The patient remains on the prescribed tobacco cessation medication regimen of 21 mg Nicotine patch without any negative side effects at this time. No refill needed at this time. The patient denies any abnormal behavioral or mental changes at this time.       Diagnosis: F17.200    Next Visit: 2 weeks

## 2024-07-30 ENCOUNTER — TELEPHONE (OUTPATIENT)
Dept: PRIMARY CARE CLINIC | Facility: CLINIC | Age: 59
End: 2024-07-30
Payer: MEDICARE

## 2024-08-06 DIAGNOSIS — E78.5 HYPERLIPIDEMIA, UNSPECIFIED HYPERLIPIDEMIA TYPE: ICD-10-CM

## 2024-08-07 RX ORDER — ROSUVASTATIN CALCIUM 10 MG/1
10 TABLET, COATED ORAL
Qty: 90 TABLET | Refills: 0 | Status: SHIPPED | OUTPATIENT
Start: 2024-08-07

## 2024-08-12 ENCOUNTER — TELEPHONE (OUTPATIENT)
Dept: PRIMARY CARE CLINIC | Facility: CLINIC | Age: 59
End: 2024-08-12
Payer: MEDICARE

## 2024-08-12 ENCOUNTER — NURSE TRIAGE (OUTPATIENT)
Dept: ADMINISTRATIVE | Facility: CLINIC | Age: 59
End: 2024-08-12
Payer: MEDICARE

## 2024-08-12 DIAGNOSIS — B37.2 CANDIDAL DERMATITIS: ICD-10-CM

## 2024-08-12 RX ORDER — PANTOPRAZOLE SODIUM 40 MG/1
TABLET, DELAYED RELEASE ORAL
COMMUNITY
Start: 2024-05-17

## 2024-08-12 RX ORDER — NYSTATIN 100000 U/G
OINTMENT TOPICAL
Qty: 30 G | Refills: 2 | Status: SHIPPED | OUTPATIENT
Start: 2024-08-12

## 2024-08-12 RX ORDER — PANTOPRAZOLE SODIUM 20 MG/1
20 TABLET, DELAYED RELEASE ORAL
COMMUNITY
Start: 2024-08-08

## 2024-08-12 NOTE — TELEPHONE ENCOUNTER
----- Message from Savannah Thrasher MA sent at 8/12/2024 11:57 AM CDT -----  Contact: [email or web address]@ [phone]  Pt called              Pt is requesting a refill on medication propranolol (INDERAL) 120 MG tablet,nystatin (MYCOSTATIN) ointment,and rosuvastatin (CRESTOR) 10 MG tablet.            [facility] - [place] - [address]  [address] [place] [address]  Phone: [phone] Fax: [phone]  Hours: Not open 24 hours

## 2024-08-12 NOTE — TELEPHONE ENCOUNTER
----- Message from Savannah Thrasher MA sent at 8/12/2024 11:57 AM CDT -----  Contact: stevan@ [phone]  Pt called              Pt is requesting a refill on medication propranolol (INDERAL) 120 MG tablet,nystatin (MYCOSTATIN) ointment,and rosuvastatin (CRESTOR) 10 MG tablet.            [facility] - [place] - [address]  [address] [place] [address]  Phone: [phone] Fax: [phone]  Hours: Not open 24 hours

## 2024-08-12 NOTE — TELEPHONE ENCOUNTER
"Transferred to [facility] nurse for numbness to lips & tongue for a couple weeks. -difficulty breathing. -difficulty swallowing.     Says "off balance today". Pt says L side numbness in ongoing & new tingling to RUE. During triage questions, pt answers YES to new weakness to one side of body, YES to sudden onset, YES to being a new symptom, YES to being present now.     Advised per protocol-hang up & call 911 now. Pt is alone at this time but says friend is across the street. Pt declines assistance calling for ambulance. Reiterated importance of following advisement based on symptoms & questions answered. Pt vu, but states she has to take a shower first, then she will call.     Reason for Disposition   [1] Weakness (i.e., paralysis, loss of muscle strength) of the face, arm / hand, or leg / foot on one side of the body AND [2] sudden onset AND [3] present now  (Exception: Bell's palsy suspected [weakness on one side of the face, over hours to days].)    Additional Information   Negative: [1] SEVERE weakness (i.e., unable to walk or barely able to walk, requires support) AND [2] new-onset or getting worse   Negative: SEVERE difficulty breathing (e.g., struggling for each breath, speaks in single words, stridor)   Negative: Sounds like a life-threatening emergency to the triager   Negative: [1] Drooling or spitting out saliva (because can't swallow) AND [2] new-onset   Negative: [1] Bleeding from mouth AND [2] won't stop after 10 minutes of direct pressure   Negative: Electrical burn of mouth   Negative: Chemical burn of mouth    Protocols used: Neurologic Deficit-A-AH, Mouth Symptoms-A-AH    "

## 2024-08-13 RX ORDER — AZITHROMYCIN 250 MG/1
TABLET, FILM COATED ORAL
COMMUNITY
Start: 2024-08-13

## 2024-08-13 RX ORDER — GLYCOPYRROLATE AND FORMOTEROL FUMARATE 9; 4.8 UG/1; UG/1
11.8 AEROSOL, METERED RESPIRATORY (INHALATION)
COMMUNITY
Start: 2024-08-12

## 2024-08-18 ENCOUNTER — NURSE TRIAGE (OUTPATIENT)
Dept: ADMINISTRATIVE | Facility: CLINIC | Age: 59
End: 2024-08-18
Payer: MEDICARE

## 2024-08-19 ENCOUNTER — OFFICE VISIT (OUTPATIENT)
Dept: URGENT CARE | Facility: CLINIC | Age: 59
End: 2024-08-19
Payer: MEDICARE

## 2024-08-19 ENCOUNTER — NURSE TRIAGE (OUTPATIENT)
Dept: ADMINISTRATIVE | Facility: CLINIC | Age: 59
End: 2024-08-19
Payer: MEDICARE

## 2024-08-19 VITALS
WEIGHT: 219 LBS | OXYGEN SATURATION: 95 % | DIASTOLIC BLOOD PRESSURE: 79 MMHG | HEIGHT: 64 IN | RESPIRATION RATE: 20 BRPM | BODY MASS INDEX: 37.39 KG/M2 | HEART RATE: 77 BPM | TEMPERATURE: 98 F | SYSTOLIC BLOOD PRESSURE: 128 MMHG

## 2024-08-19 DIAGNOSIS — S92.425A CLOSED NONDISPLACED FRACTURE OF DISTAL PHALANX OF LEFT GREAT TOE, INITIAL ENCOUNTER: Primary | ICD-10-CM

## 2024-08-19 DIAGNOSIS — M79.675 PAIN OF LEFT GREAT TOE: ICD-10-CM

## 2024-08-19 PROCEDURE — 99213 OFFICE O/P EST LOW 20 MIN: CPT | Mod: S$GLB,,,

## 2024-08-19 PROCEDURE — 73660 X-RAY EXAM OF TOE(S): CPT | Mod: FY,LT,S$GLB, | Performed by: RADIOLOGY

## 2024-08-19 NOTE — TELEPHONE ENCOUNTER
Reason for Disposition   Caller mainly has complaints about past medical care, billing, etc. and has mild symptoms or no symptoms    Protocols used: Difficult Call-A-OH  Pt is complaining that she cannot reach her PCP for a medication refill. She states Shaan Bland would have got her antibiotics immediately. Pt is rude and difficult to triage. Advised this nurse is ending the call. Message sent to pt's PCP.

## 2024-08-19 NOTE — PROGRESS NOTES
"Subjective:      Patient ID: Emma Morales is a 59 y.o. female.    Vitals:  height is 5' 4" (1.626 m) and weight is 99.3 kg (219 lb). Her oral temperature is 98 °F (36.7 °C). Her blood pressure is 128/79 and her pulse is 77. Her respiration is 20 and oxygen saturation is 95%.     Chief Complaint: Toe Injury (Left foot/ Big toe )    Pt present with trauma to her left foot from tripping and stepping over a plastic box, but her toe had gotten caught in the box that happened very early this morning. Pt stated she was in the ER after been exposed to bug poison  last week and is taking a Z-pack.     Provider note starts below:  Patient presents to clinic for evaluation of pain, swelling, and bruising to left great toe. Patient reports tripping over a box early this morning. States her big toe got caught in the box and bent in the wrong direction. Patient states symptoms onset gradually after the incident. She has been applying ice and elevating the toe. She is able to ambulate but with some pain noted. Patient denies any other injuries, numbness, tingling, weakness, wound, laceration, abrasion. No other complaints.     Toe Injury  This is a new problem. The current episode started today. The problem occurs constantly. The problem has been gradually worsening. Associated symptoms include arthralgias (L great toe) and joint swelling (L great toe). Pertinent negatives include no abdominal pain, chest pain, chills, fever, headaches, neck pain, numbness, rash or weakness. The symptoms are aggravated by walking and standing. She has tried acetaminophen for the symptoms. The treatment provided no relief.       Constitution: Negative for chills and fever.   Neck: Negative for neck pain.   Cardiovascular:  Negative for chest trauma and chest pain.   Eyes:  Negative for eye trauma.   Respiratory:  Negative for shortness of breath.    Gastrointestinal:  Negative for abdominal trauma and abdominal pain.   Musculoskeletal:  Positive " for joint pain (L great toe) and joint swelling (L great toe). Negative for abnormal ROM of joint.   Skin:  Positive for bruising (L great toe). Negative for pale, rash, wound, abrasion and laceration.   Neurological:  Negative for dizziness, headaches, disorientation, altered mental status, numbness and tingling.   Psychiatric/Behavioral:  Negative for altered mental status, disorientation and confusion.       Objective:     Physical Exam   Constitutional: She is oriented to person, place, and time.  Non-toxic appearance. She does not appear ill. No distress.   HENT:   Head: Normocephalic and atraumatic.   Eyes: Conjunctivae are normal. Extraocular movement intact   Neck: Neck supple.   Cardiovascular:   Pulses:       Dorsalis pedis pulses are 2+ on the left side.        Posterior tibial pulses are 2+ on the left side.   Pulmonary/Chest: Effort normal.   Abdominal: Normal appearance.   Musculoskeletal: Normal range of motion.         General: Normal range of motion.      Left foot: Left great toe: Exhibits ecchymosis, swelling and tenderness.   Neurological: She is alert, oriented to person, place, and time and at baseline.   Skin: Skin is warm and dry.   Psychiatric: Her behavior is normal. Mood normal.   Nursing note and vitals reviewed.    Assessment:     XR Toe Left  My interpretation: nondisplaced fracture of distal phalanx of left great toe    1. Closed nondisplaced fracture of distal phalanx of left great toe, initial encounter    2. Pain of left great toe        Plan:     Closed nondisplaced fracture of distal phalanx of left great toe, initial encounter  -     Jonathan tape (specify site)    Pain of left great toe  -     X-Ray Toe 2 or More Views Left; Future; Expected date: 08/19/2024            Patient Instructions   About this topic   A broken toe is also known as a toe fracture. You will need to limit movement of your toe to help the bone heal in the correct position. You may need surgery if you have a  severe toe fracture.     Treatment  Tylenol 650-1,000 mg every 4-6 hours as needed for pain. No more than 4,000 mg in 24 hours.   Wear your cast, walking boot, or special hard-soled shoe to support your foot as you were told to. After a few weeks, you may be able to take off the cast or boot and tape your toe to the one next to it for support. This is called buddy taping.  Prop your foot on pillows keeping it above the level of your heart. This may help lessen pain and swelling.  You can take pain medicines like acetaminophen to help with pain.  Ice may help you ease pain and swelling.  Place an ice pack or a bag of frozen vegetables wrapped in a towel over the painful part. Never put ice right on the skin. Do not leave the ice on more than 10 to 15 minutes at a time. Use ice for the first 24 to 48 hours after an injury.  If you smoke, try to quit. Broken bones take longer to heal if you smoke.  You may need to rest your foot for a while. You may be limited in how much weight you are allowed to put through the foot with the injured toe. You should not do physical activity that makes your health problem worse. If you run, work out, or play sports, you may not be able to do those things until your health problem gets better.    When do I need to call the doctor?   The foot with the broken toe becomes more swollen or starts to hurt more.  You have less feeling or movement in your toes.    Should you develop any worsening or new symptoms after leaving urgent care, it is recommended that you go to the ER for further/repeat evaluation.      Follow up with your PCP in 3-5 days after your urgent care visit.     Please remember that you have received care at an urgent care today. Urgent cares are not emergency rooms and are not equipped to handle life threatening emergencies and cannot rule in or out certain medical conditions and you may be released before all of your medical problems are known or treated, please schedule  all follow up appointments as discussed and if you have worsening symptoms please go to the ER to rule out potential life threatening problems, as discussed.

## 2024-08-19 NOTE — TELEPHONE ENCOUNTER
Pt called in she's says she lost her medication Z-Antony she was given at the ER. She asking for another to be sent in for her.

## 2024-08-19 NOTE — TELEPHONE ENCOUNTER
Patient is calling with her original request to have a refil of a z-pack not ordered by a [facility] provider. I informed patient of the advice to reach her PCP when clinic is open. Patient then began to say she was feeling her throat close up. I then triaged using the difficulty swallowing protocol and informed patient per her response to see her provider in 24h. I also informed her of Urgent virtual which she refused. Patient is refusing going to the ED.   I called to the oncall due to patient concerns of [facility] not caring about her etc. Dr. Munoz once hearing the patients medications and complaints has advised patient to the ED or to see her PCP in office in AM. I called to patient and while giving advice the patient hung up.  Reason for Disposition   [1] Swallowing difficulty AND [2] cause unknown  (Exception: Difficulty swallowing is a chronic symptom.)    Additional Information   Negative: [1] Intentional drug overdose AND [2] suicidal thoughts or ideas   Negative: Drug overdose and triager unable to answer question   Negative: Caller requesting a renewal or refill of a medicine patient is currently taking   Negative: Caller requesting information unrelated to medicine   Negative: Caller requesting information about COVID-19 Vaccine   Negative: Caller requesting information about Emergency Contraception   Negative: Caller requesting information about Combined Birth Control Pills   Negative: Caller requesting information about Progestin Birth Control Pills   Negative: Caller requesting information about post-op pain or medicines   Negative: Caller requesting a prescription antibiotic (such as Penicillin) for Strep throat and has a positive culture result   Negative: Caller requesting a prescription anti-viral med (such as Tamiflu) and has influenza (flu) symptoms   Negative: Immunization reaction suspected   Negative: Rash while taking a medicine or within 3 days of stopping it   Negative: [1] Asthma and [2]  having symptoms of asthma (cough, wheezing, etc.)   Negative: [1] Symptom of illness (e.g., headache, abdominal pain, earache, vomiting) AND [2] more than mild   Negative: Breastfeeding questions about mother's medicines and diet   Negative: [1] DOUBLE DOSE (an extra dose or lesser amount) of prescription drug AND [2] NO symptoms  (Exception: A double dose of antibiotics.)   Negative: Diabetes drug error or overdose (e.g., took wrong type of insulin or took extra dose)   Negative: [1] Prescription not at pharmacy AND [2] was prescribed by PCP recently (Exception: Triager has access to EMR and prescription is recorded there. Go to Home Care and confirm for pharmacy.)   Negative: [1] Pharmacy calling with prescription question AND [2] triager unable to answer question   Negative: [1] Caller has URGENT medicine question about med that PCP or specialist prescribed AND [2] triager unable to answer question   Negative: Medicine patch causing local rash or itching   Negative: MORE THAN A DOUBLE DOSE of a prescription or over-the-counter (OTC) drug   Negative: [1] DOUBLE DOSE (an extra dose or lesser amount) of prescription drug AND [2] any symptoms (e.g., dizziness, nausea, pain, sleepiness)   Negative: [1] DOUBLE DOSE (an extra dose or lesser amount) of over-the-counter (OTC) drug AND [2] any symptoms (e.g., dizziness, nausea, pain, sleepiness)   Negative: Took another person's prescription drug   Negative: [1] Severe difficulty swallowing (e.g., drooling or spitting) AND [2] started suddenly after taking a medicine or allergic food   Negative: Wheezing, stridor, hoarseness, or difficulty breathing   Negative: [1] Swollen tongue AND [2] sudden onset   Negative: Sounds like a life-threatening emergency to the triager   Negative: Mouth ulcers are seen   Negative: Sore throat (throat pain with swallowing)   Negative: Swallowed a (non-edible) foreign body   Negative: Feeding tube, questions or concerns related to   Negative:  Swelling of uvula   Negative: Swelling of tongue   Negative: SEVERE difficulty swallowing (e.g., drooling or spitting, can't swallow water)   Negative: [1] Symptoms of blocked esophagus (e.g., can't swallow normal secretions, drooling) AND [2] present now   Negative: Symptoms of food or bone stuck in throat or esophagus (e.g., pain in throat or chest, FB sensation, blood-tinged saliva)   Negative: SEVERE symptoms of pill stuck in throat or esophagus (e.g., severe pain, bleeding, or inability to swallow liquids)   Negative: [1] Drinking very little AND [2] dehydration suspected (e.g., no urine > 12 hours, very dry mouth, very lightheaded)   Negative: [1] Refuses to drink anything AND [2] for > 12 hours   Negative: Patient sounds very sick or weak to the triager   Negative: Fever > 100.4 F (38.0 C)   Negative: [1] Coughing spells AND [2] occur during eating/feedings or within 2 hours   Negative: [1] Symptoms of pill stuck in throat or esophagus (e.g., pain in throat or chest, FB sensation) AND [2] no relief after using Care Advice   Negative: Weak immune system (e.g., HIV positive, cancer chemo, splenectomy, organ transplant, chronic steroids)    Protocols used: Medication Question Call-A-, Swallowing Difficulty-A-AH

## 2024-08-20 NOTE — PATIENT INSTRUCTIONS
About this topic   A broken toe is also known as a toe fracture. You will need to limit movement of your toe to help the bone heal in the correct position. You may need surgery if you have a severe toe fracture.     Treatment  Tylenol 650-1,000 mg every 4-6 hours as needed for pain. No more than 4,000 mg in 24 hours.   Wear your cast, walking boot, or special hard-soled shoe to support your foot as you were told to. After a few weeks, you may be able to take off the cast or boot and tape your toe to the one next to it for support. This is called buddy taping.  Prop your foot on pillows keeping it above the level of your heart. This may help lessen pain and swelling.  You can take pain medicines like acetaminophen to help with pain.  Ice may help you ease pain and swelling.  Place an ice pack or a bag of frozen vegetables wrapped in a towel over the painful part. Never put ice right on the skin. Do not leave the ice on more than 10 to 15 minutes at a time. Use ice for the first 24 to 48 hours after an injury.  If you smoke, try to quit. Broken bones take longer to heal if you smoke.  You may need to rest your foot for a while. You may be limited in how much weight you are allowed to put through the foot with the injured toe. You should not do physical activity that makes your health problem worse. If you run, work out, or play sports, you may not be able to do those things until your health problem gets better.    When do I need to call the doctor?   The foot with the broken toe becomes more swollen or starts to hurt more.  You have less feeling or movement in your toes.    Should you develop any worsening or new symptoms after leaving urgent care, it is recommended that you go to the ER for further/repeat evaluation.      Follow up with your PCP in 3-5 days after your urgent care visit.     Please remember that you have received care at an urgent care today. Urgent cares are not emergency rooms and are not equipped  to handle life threatening emergencies and cannot rule in or out certain medical conditions and you may be released before all of your medical problems are known or treated, please schedule all follow up appointments as discussed and if you have worsening symptoms please go to the ER to rule out potential life threatening problems, as discussed.

## 2024-08-23 ENCOUNTER — TELEPHONE (OUTPATIENT)
Dept: SMOKING CESSATION | Facility: CLINIC | Age: 59
End: 2024-08-23
Payer: MEDICARE

## 2024-08-26 ENCOUNTER — TELEPHONE (OUTPATIENT)
Dept: SMOKING CESSATION | Facility: CLINIC | Age: 59
End: 2024-08-26
Payer: MEDICARE

## 2024-08-30 ENCOUNTER — TELEPHONE (OUTPATIENT)
Dept: PRIMARY CARE CLINIC | Facility: CLINIC | Age: 59
End: 2024-08-30
Payer: MEDICARE

## 2024-08-30 NOTE — TELEPHONE ENCOUNTER
----- Message from Yuliajaycee Pride sent at 8/30/2024  3:11 PM CDT -----  Contact: [phone]  [email or web address]     Patient is calling for Medical Advice regarding:pt is calling she broke her toe went to  and its still swollen and bruising and hurts and red.  Please call her back and advise.    How long has patient had these symptoms:    Pharmacy name and phone#:    Patient wants a call back or thru [facility]:callback    Comments:    Please advise patient replies from provider may take up to 48 hours.

## 2024-09-03 ENCOUNTER — CLINICAL SUPPORT (OUTPATIENT)
Dept: SMOKING CESSATION | Facility: CLINIC | Age: 59
End: 2024-09-03
Payer: COMMERCIAL

## 2024-09-03 DIAGNOSIS — F17.200 NICOTINE DEPENDENCE: Primary | ICD-10-CM

## 2024-09-03 PROCEDURE — 99407 BEHAV CHNG SMOKING > 10 MIN: CPT | Mod: S$GLB,,,

## 2024-09-05 ENCOUNTER — CLINICAL SUPPORT (OUTPATIENT)
Dept: SMOKING CESSATION | Facility: CLINIC | Age: 59
End: 2024-09-05
Payer: COMMERCIAL

## 2024-09-05 DIAGNOSIS — F17.200 NICOTINE DEPENDENCE: Primary | ICD-10-CM

## 2024-09-05 PROCEDURE — 99404 PREV MED CNSL INDIV APPRX 60: CPT | Mod: S$GLB,,,

## 2024-09-05 PROCEDURE — 99999 PR PBB SHADOW E&M-EST. PATIENT-LVL I: CPT | Mod: PBBFAC,,,

## 2024-09-05 RX ORDER — IBUPROFEN 200 MG
1 TABLET ORAL DAILY
Qty: 28 PATCH | Refills: 0 | Status: SHIPPED | OUTPATIENT
Start: 2024-09-05

## 2024-09-05 NOTE — PROGRESS NOTES
Quit 2 intake was done by phone, patient does not have transportation.  Patient reports smoking 7 cigarettes a day. FTND score of 2 indicates a low level of nicotine dependence. TWIN-D score of 21 perceived as  some degree of mental distress / possible depression. Patient will begin the prescribed tobacco cessation medication regimen of 14 mg Nicotine patch and 2 mg lozenges. Discussed and reviewed with the patient regarding NRT's and medication along with behavioral therapy & counseling to assist patient with meeting her goal of being smoke free. Patient is agreeable to participate in bi-weekly sessions. Patient educated on role & usage possible side effects. Patient encouraged to keep a smoking diary to log for her daily cigarette usage. Reviewed behavioral modification strategy of rate reduction and wait time of 15 min prior to smoking. Patient verbalized understanding & willingness to apply. Patient instructed to call TTS with any questions or concerns.

## 2024-09-06 ENCOUNTER — NURSE TRIAGE (OUTPATIENT)
Dept: ADMINISTRATIVE | Facility: CLINIC | Age: 59
End: 2024-09-06
Payer: MEDICARE

## 2024-09-06 NOTE — TELEPHONE ENCOUNTER
Pt calling in reporting a laceration to the side of her knee. Happened last Saturday morning. About 6 inches in length. Pt can see the fat and has not been seen by a provider since it happened. Pt has it taped together well and has been using triple antibiotic. No swelling, it looks fine- just needs to heal according to pt. Not bleeding. Happened when she knelt on a broken glass canister that was inside of a bag that she did not know it had broken in. Pt also broke her toe 3 weeks ago, seen in . Not improving and recently spoke with her PCP who advised she should get a follow up xray and then come into office to be seen. Pt plans on going to  today or tomorrow for x-ray. She was originally calling in to get her follow up appt sched for next week with Dr. Roe.    Dispo- see provider within 3 days. Unable to sched appt with PCP for today or Monday. But pt does plan on going to . Care advice given. Message routed to pcp staff.  Reason for Disposition   Minor cut or scratch   [1] After 1 week AND [2] not using the toe normally    Additional Information   Negative: Major bleeding (e.g., actively dripping or spurting) and can't be stopped   Negative: Amputation   Negative: Shock suspected (e.g., cold/pale/clammy skin, too weak to stand, low BP, rapid pulse)   Negative: Knife wound (or other possibly deep cut) and to chest, abdomen, back, neck, or head   Negative: Self-injury (e.g., cutting, self-harm) and suicidal or out-of-control   Negative: Sounds like a life-threatening emergency to the triager   Negative: Bleeding and won't stop after 10 minutes of direct pressure (using correct technique)   Negative: Skin is split open or gaping (or length > 1/2 inch or 12 mm on the skin, 1/4 inch or 6 mm on the face)   Negative: Deep cut and can see bone or tendons   Negative: Cut over knuckle (MCP joint)   Negative: Sensation of something in the wound (i.e., retained object in wound)   Negative: Dirt in the wound and not  removed with 15 minutes of scrubbing   Negative: Wound causes numbness (i.e., loss of sensation)   Negative: Wound causes weakness (i.e., decreased ability to move hand, finger, toe)   Negative: SEVERE pain and not improved 2 hours after pain medicine   Negative: Looks infected and large red area (> 2 inches or 5 cm) or streak   Negative: Fever and spreading red area or streak   Negative: Suspicious history for the injury   Negative: Looks infected (spreading redness, pus) and no fever   Negative: No prior tetanus shots (or is not fully vaccinated)   Negative: HIV positive or severe immunodeficiency (severely weak immune system) and DIRTY cut   Negative: Patient wants to be seen   Negative: Last tetanus shot > 5 years ago and DIRTY cut   Negative: Last tetanus shot > 10 years ago and CLEAN cut   Negative: After 14 days and wound isn't healed   Negative: Diabetes mellitus and minor cut or scratch on foot   Negative: Minor cut or scratch and from self-injury (e.g., cutting, self-harm) and stable (i.e., not suicidal, not out of control)    Protocols used: Cuts and Cmhogdyffkw-C-PX, Toe Injury-A-AH

## 2024-09-17 ENCOUNTER — CLINICAL SUPPORT (OUTPATIENT)
Dept: SMOKING CESSATION | Facility: CLINIC | Age: 59
End: 2024-09-17
Payer: COMMERCIAL

## 2024-09-17 DIAGNOSIS — F17.200 NICOTINE DEPENDENCE: Primary | ICD-10-CM

## 2024-09-17 PROCEDURE — 99402 PREV MED CNSL INDIV APPRX 30: CPT | Mod: S$GLB,,,

## 2024-09-17 NOTE — PROGRESS NOTES
"Individual Follow-Up Form    9/17/2024    Quit Date:     Clinical Status of Patient: Outpatient    Length of Service: 30 minutes    Continuing Medication: yes  Patches or Nicotine Lozenges    Other Medications:      Target Symptoms: Withdrawal and medication side effects. The following were  rated moderate (3) to severe (4) on TCRS:  Moderate (3): crave  Severe (4): none    Comments: Spoke to patient this afternoon in regard to smoking cessation progress update. Patient stated "that she has her heart set on quitting this time". Commended patient on progress thus far. Patient making it difficult for her to smoke and not going out as much and moving her cigarettes. She is smoking on average 5 cpd. She is pleased with her progress. Patient has not started wearing patches yet. She had wanted to wait, but will start them tomorrow. Discussed and reviewed learned addiction model, cues/triggers, personal reasons for quitting, medications, goals, quit date. Encouraged patient to keep setting daily goals and stay focused on those goals. The patient will continue with  therapy sessions and medication monitoring by CTTS. Prescribed medication management will be by physician. The patient denies any abnormal behavioral or mental changes at this time.       Diagnosis: F17.200    Next Visit: 2 weeks    "

## 2024-09-18 ENCOUNTER — LAB VISIT (OUTPATIENT)
Dept: LAB | Facility: HOSPITAL | Age: 59
End: 2024-09-18
Attending: INTERNAL MEDICINE
Payer: MEDICARE

## 2024-09-18 ENCOUNTER — OFFICE VISIT (OUTPATIENT)
Dept: PRIMARY CARE CLINIC | Facility: CLINIC | Age: 59
End: 2024-09-18
Payer: MEDICARE

## 2024-09-18 VITALS
DIASTOLIC BLOOD PRESSURE: 82 MMHG | HEIGHT: 64 IN | OXYGEN SATURATION: 95 % | SYSTOLIC BLOOD PRESSURE: 120 MMHG | WEIGHT: 225.5 LBS | BODY MASS INDEX: 38.5 KG/M2 | HEART RATE: 77 BPM

## 2024-09-18 DIAGNOSIS — Z72.0 TOBACCO USE: ICD-10-CM

## 2024-09-18 DIAGNOSIS — Z80.3 FAMILY HISTORY OF BREAST CANCER IN FIRST DEGREE RELATIVE: ICD-10-CM

## 2024-09-18 DIAGNOSIS — F31.9 BIPOLAR 1 DISORDER: ICD-10-CM

## 2024-09-18 DIAGNOSIS — M85.851 OSTEOPENIA OF NECK OF RIGHT FEMUR: ICD-10-CM

## 2024-09-18 DIAGNOSIS — F51.02 ADJUSTMENT INSOMNIA: ICD-10-CM

## 2024-09-18 DIAGNOSIS — G89.29 CHRONIC BILATERAL LOW BACK PAIN WITH BILATERAL SCIATICA: ICD-10-CM

## 2024-09-18 DIAGNOSIS — Z23 NEED FOR VACCINATION: ICD-10-CM

## 2024-09-18 DIAGNOSIS — J41.0 SIMPLE CHRONIC BRONCHITIS: ICD-10-CM

## 2024-09-18 DIAGNOSIS — Z12.31 ENCOUNTER FOR SCREENING MAMMOGRAM FOR BREAST CANCER: ICD-10-CM

## 2024-09-18 DIAGNOSIS — S71.112A LACERATION OF LEFT THIGH, INITIAL ENCOUNTER: ICD-10-CM

## 2024-09-18 DIAGNOSIS — E78.2 MIXED HYPERLIPIDEMIA: ICD-10-CM

## 2024-09-18 DIAGNOSIS — Z00.00 PREVENTATIVE HEALTH CARE: ICD-10-CM

## 2024-09-18 DIAGNOSIS — G20.A1 PARKINSON'S DISEASE, UNSPECIFIED WHETHER DYSKINESIA PRESENT, UNSPECIFIED WHETHER MANIFESTATIONS FLUCTUATE: ICD-10-CM

## 2024-09-18 DIAGNOSIS — E03.9 HYPOTHYROIDISM, UNSPECIFIED TYPE: ICD-10-CM

## 2024-09-18 DIAGNOSIS — E55.9 VITAMIN D DEFICIENCY: ICD-10-CM

## 2024-09-18 DIAGNOSIS — M54.42 CHRONIC BILATERAL LOW BACK PAIN WITH BILATERAL SCIATICA: ICD-10-CM

## 2024-09-18 DIAGNOSIS — S92.425D CLOSED NONDISPLACED FRACTURE OF DISTAL PHALANX OF LEFT GREAT TOE WITH ROUTINE HEALING: Primary | ICD-10-CM

## 2024-09-18 DIAGNOSIS — M54.41 CHRONIC BILATERAL LOW BACK PAIN WITH BILATERAL SCIATICA: ICD-10-CM

## 2024-09-18 LAB — VALPROATE SERPL-MCNC: 14.2 UG/ML (ref 50–100)

## 2024-09-18 PROCEDURE — 3079F DIAST BP 80-89 MM HG: CPT | Mod: CPTII,S$GLB,, | Performed by: INTERNAL MEDICINE

## 2024-09-18 PROCEDURE — 90677 PCV20 VACCINE IM: CPT | Mod: S$GLB,,, | Performed by: INTERNAL MEDICINE

## 2024-09-18 PROCEDURE — G0009 ADMIN PNEUMOCOCCAL VACCINE: HCPCS | Mod: S$GLB,,, | Performed by: INTERNAL MEDICINE

## 2024-09-18 PROCEDURE — G0008 ADMIN INFLUENZA VIRUS VAC: HCPCS | Mod: S$GLB,,, | Performed by: INTERNAL MEDICINE

## 2024-09-18 PROCEDURE — 99999 PR PBB SHADOW E&M-EST. PATIENT-LVL V: CPT | Mod: PBBFAC,,, | Performed by: INTERNAL MEDICINE

## 2024-09-18 PROCEDURE — 3008F BODY MASS INDEX DOCD: CPT | Mod: CPTII,S$GLB,, | Performed by: INTERNAL MEDICINE

## 2024-09-18 PROCEDURE — 99215 OFFICE O/P EST HI 40 MIN: CPT | Mod: S$GLB,,, | Performed by: INTERNAL MEDICINE

## 2024-09-18 PROCEDURE — 36415 COLL VENOUS BLD VENIPUNCTURE: CPT | Mod: PN | Performed by: INTERNAL MEDICINE

## 2024-09-18 PROCEDURE — 3074F SYST BP LT 130 MM HG: CPT | Mod: CPTII,S$GLB,, | Performed by: INTERNAL MEDICINE

## 2024-09-18 PROCEDURE — 90656 IIV3 VACC NO PRSV 0.5 ML IM: CPT | Mod: S$GLB,,, | Performed by: INTERNAL MEDICINE

## 2024-09-18 PROCEDURE — 80164 ASSAY DIPROPYLACETIC ACD TOT: CPT | Performed by: INTERNAL MEDICINE

## 2024-09-18 PROCEDURE — 1159F MED LIST DOCD IN RCRD: CPT | Mod: CPTII,S$GLB,, | Performed by: INTERNAL MEDICINE

## 2024-09-18 RX ORDER — LIDOCAINE 50 MG/G
2 PATCH TOPICAL DAILY
Qty: 60 PATCH | Refills: 5 | Status: SHIPPED | OUTPATIENT
Start: 2024-09-18 | End: 2025-03-17

## 2024-09-18 RX ORDER — MUPIROCIN 20 MG/G
OINTMENT TOPICAL 2 TIMES DAILY
Qty: 22 G | Refills: 3 | Status: SHIPPED | OUTPATIENT
Start: 2024-09-18

## 2024-09-18 NOTE — ASSESSMENT & PLAN NOTE
Cont  Seroquel 300mg bid, cymbalta 60mg bid; now Depakote 500mg qhs (stopped the 250mg qhs since too sleepy)    Check valproic acid level

## 2024-09-18 NOTE — ASSESSMENT & PLAN NOTE
Get flu today  Get COVIDJunaid    Schedule MMG at [facility]  Schedule colonoscopy with Dr. Boyd at [facility]     Schedule dental visit     Labs today

## 2024-09-18 NOTE — ASSESSMENT & PLAN NOTE
Start Nicoderm 14mg patches and start nicorette lozenges;   -cont smoking cesstion program at [facility]

## 2024-09-18 NOTE — PATIENT INSTRUCTIONS
RICE  Apply Tylenol AR up to 3 times per day  Aspercreme roll-on up to 4 times per day     Start Nicoderm 14mg patches and start nicorette lozenges;   -cont smoking cesstion program at [facility]     Start Lidoderm 5% patch daily   Can take Tyelnol AR tid     Keep clean and dry and then apply bactroban bid until resolved     Get flu today  Get COVID, Shingrex    Schedule MMG at [facility]  Schedule colonoscopy with Dr. Boyd at [facility]     Schedule dental visit     Labs today    Refer to genetics

## 2024-09-18 NOTE — PROGRESS NOTES
[facility] Progress Note      Chief Complaint     Follow-up and Toe Injury       Subjective:      History of Present Illness:  Emma Morales is a 59 y.o. female     S/p left medial distal thigh laceration a few weeks ago: cut on car;  healing well    S/p fracture of distal phalanx of the 1st toe; using occasional tylenol; allergic NSAIDs    Vit D def'y: stopped taking Vit D supplements    Simple chronic bronchitis: only smoking 5-7 cigs per day; now on Nicoderm 14mg patches but has not tried  nicorette lozenges; uses smoking cesstion program at [facility]   -now on Breztri 2 puffs bid - newly prescribed by [facility] with albuterol prn   -had normal 4/2024 LDCT     Hypothyroidism:  takes Levo 75mcg po daily     GERD: taking protonix 20mg daily     Insomnia: takes Trazodone  200mg op qhs     Chronic bilateral LBP: not interested in PT at this time     HLD: takes crestor 10mg daily and Vascepa 2 tabs po bid; is not fasting today and did not take her crestor yesterday    Parkinson's: had to move her appt with Dr. Kohler; still taking  propanolol, tizanidine daily, Sinemet tid and baclofen      Bipolar 1 disorder: taking Seroquel 300mg bid, cymbalta 60mg bid; now Depakote 500mg qhs (stopped the 250mg qhs since too sleepy)  DYLAN/panic attacks; takes klonopin 2mg prn to stressful situations.     FH: breast cancer: maternal aunts, mom, MGM, PGM- would like gene testing        Past Medical History:  Past Medical History:   Diagnosis Date    Abscess of chest wall 06/30/2014    Bipolar 1 disorder     Candidal dermatitis 06/15/2023    Housing instability after recent homelessness 06/15/2023    Hyperglycemia 02/15/2023    Parkinson disease     Spinal injury     Trauma 12/27/2022    Wound discharge 07/15/2014       Past Surgical History:  Past Surgical History:   Procedure Laterality Date    abdominal cyst removal Left 2000    ELBOW SURGERY Bilateral 2014\2012    nerves    HYSTERECTOMY  2008     "KNEE SURGERY Bilateral     MOUTH SURGERY         Allergies:  Review of patient's allergies indicates:   Allergen Reactions    Nsaids (non-steroidal anti-inflammatory drug) Anaphylaxis    Ibuprofen Hives    Indomethacin Hives    Amitriptyline      will kill her    Cefdinir     Doxycycline     Fluconazole     Gabapentin Other (See Comments)     Pt states "she cant remember"     Lamotrigine Hives    Lurasidone     Methylprednisolone     Naproxen Hives    Ciprofloxacin Palpitations       Home Medications:  Current Outpatient Medications   Medication Sig Dispense Refill    albuterol sulfate (PROAIR RESPICLICK) 90 mcg/actuation inhaler Inhale 2 puffs into the lungs every 4 (four) hours. Rescue 1 each 1    baclofen (LIORESAL) 10 MG tablet Take 10 mg by mouth 3 (three) times daily.      benzonatate (TESSALON) 200 MG capsule Take 1 capsule (200 mg total) by mouth 3 (three) times daily as needed for Cough. 90 capsule 3    carbidopa-levodopa-entacapone -200 mg (STALEVO) -200 mg Tab Take 1 tablet by mouth 3 (three) times daily.      clonazepam (KLONOPIN) 2 MG Tab Take 8 mg by mouth 2 (two) times daily as needed.   1    dextroamphetamine-amphetamine 10 mg Tab TK 1 T PO  QID      divalproex (DEPAKOTE) 500 MG TbEC   1    DULoxetine (CYMBALTA) 60 MG capsule Take 60 mg by mouth 2 (two) times daily.      fluticasone propionate (FLONASE) 50 mcg/actuation nasal spray 1 spray by Each Nostril route once daily.      icosapent ethyL (VASCEPA) 1 gram Cap Take 2 capsules (2 g total) by mouth 2 (two) times daily. 120 capsule 5    levothyroxine (SYNTHROID) 75 MCG tablet TAKE ONE TABLET BY MOUTH EVERY DAY BEFORE BREAKFAST 90 tablet 3    mupirocin (BACTROBAN) 2 % ointment Apply to affected area 3 times daily 22 g 1    nicotine (NICODERM CQ) 14 mg/24 hr Place 1 patch onto the skin once daily. 28 patch 0    nystatin (MYCOSTATIN) ointment APPLY TO AFFECTED AREA(S) THREE TIMES A DAY 30 g 2    pantoprazole (PROTONIX) 20 MG tablet Take 20 " mg by mouth.      propranolol (INDERAL) 40 MG tablet   6    quetiapine fumarate (SEROQUEL ORAL) Take by mouth.      rosuvastatin (CRESTOR) 10 MG tablet TAKE ONE TABLET BY MOUTH EVERY DAY 90 tablet 0    tiZANidine (ZANAFLEX) 4 MG tablet Take 4 mg by mouth 3 (three) times daily.      trazodone (DESYREL) 100 MG tablet Take 200 mg by mouth every evening.  1    budesonide-glycopyr-formoterol 160-9-4.8 mcg/actuation HFAA Inhale 2 puffs into the lungs 2 (two) times daily.      LIDOcaine (LIDODERM) 5 % Place 2 patches onto the skin once daily. Remove & Discard patch within 12 hours or as directed by MD 60 patch 5    mupirocin (BACTROBAN) 2 % ointment Apply topically 2 (two) times daily. 22 g 3     No current facility-administered medications for this visit.        Family History:  Family History   Problem Relation Name Age of Onset    Hypertension Mother      Heart disease Father dm        Social History:  Social History     Tobacco Use    Smoking status: Every Day     Current packs/day: 1.50     Average packs/day: 1.5 packs/day for 44.7 years (67.1 ttl pk-yrs)     Types: Cigarettes     Start date: 1/1/1980    Smokeless tobacco: Never    Tobacco comments:     Patches and Lozenges   Substance Use Topics    Alcohol use: No    Drug use: No       Review of Systems:  Pertinent positives and negatives listed in HPI. All other systems are reviewed and are negative.    Health Maintaince :   Health Maintenance Topics with due status: Not Due       Topic Last Completion Date    TETANUS VACCINE 10/28/2014    Hemoglobin A1c (Diabetic Prevention Screening) 02/15/2023    Lipid Panel 02/15/2023    LDCT Lung Screen 03/05/2024           Eye: UTD   Dental: needs    Immunizations:     Cancer Screening:  PAP: UTD   Mammogram: UTD needs  Colonoscopy:   DEXA:    LDCT: 3/2024  Colon: need to schedule with Dr. Boyd     The 10-year ASCVD risk score (Edward PAGAN, et al., 2019) is: 5.8%    Values used to calculate the score:      Age: 59 years       "Sex: Female      Is Non- : No      Diabetic: No      Tobacco smoker: Yes      Systolic Blood Pressure: 120 mmHg      Is BP treated: No      HDL Cholesterol: 55 mg/dL      Total Cholesterol: 169 mg/dL      Objective:   /82 (BP Location: Left arm, Patient Position: Sitting, BP Method: Medium (Manual))   Pulse 77   Ht 5' 4" (1.626 m)   Wt 102.3 kg (225 lb 8.5 oz)   LMP  (LMP Unknown) Comment: hysterectomy  SpO2 95%   BMI 38.71 kg/m²      Body mass index is 38.71 kg/m².       Physical Examination:  General: Alert and awake in no apparent distress  HEENT: Normocephalic and atraumatic; Tms WNL  Eyes:  PERRL; EOMi with anicteric sclera and clear conjunctivae  Mouth:  Oropharynx clear and without exudate; moist mucous membranes  Neck:   Cervical nodes not enlarged (No submental, submandibular, preauricular, posterior auricular or occipital adenopathy); supple; no bruits  Cardio:  Regular rate and rhythm with normal S1 and S2; no murmurs or rubs  Resp:  CTAB; respirations unlabored; no wheezes, crackles or rhonchi  Abdom: Soft, NTND with normoactive bowel sounds; negative HSM  Extrem: Warm and well-perfused with no clubbing, cyanosis or edema; left big toe swollen, erythematous  Skin:  Left distal medial thigh with 3 inc laceration; No rashes, lesions, or color changes  Pulses:  2+ and symmetric distally  Neuro:  AAOx3; cooperative and pleasant with no focal deficits    Laboratory:      Most Recent Data:  Lab Results   Component Value Date    WBC 9.96 03/25/2023    HGB 13.4 03/25/2023    HCT 41.4 03/25/2023     03/25/2023    CHOL 169 02/15/2023    TRIG 70 02/15/2023    HDL 55 02/15/2023    ALT 12 03/25/2023    AST 33 03/25/2023     (L) 03/25/2023    K 4.0 03/25/2023    CL 96 03/25/2023    BUN 10 03/25/2023    CO2 24 03/25/2023    TSH 1.012 03/25/2023    HGBA1C 5.3 02/15/2023              CBC:   WBC   Date Value Ref Range Status   03/25/2023 9.96 3.90 - 12.70 K/uL Final "     Hemoglobin   Date Value Ref Range Status   03/25/2023 13.4 12.0 - 16.0 g/dL Final     Hematocrit   Date Value Ref Range Status   03/25/2023 41.4 37.0 - 48.5 % Final     Platelets   Date Value Ref Range Status   03/25/2023 173 150 - 450 K/uL Final     MCV   Date Value Ref Range Status   03/25/2023 99 (H) 82 - 98 fL Final     RDW   Date Value Ref Range Status   03/25/2023 13.4 11.5 - 14.5 % Final     BMP:   Sodium   Date Value Ref Range Status   03/25/2023 131 (L) 136 - 145 mmol/L Final     Potassium   Date Value Ref Range Status   03/25/2023 4.0 3.5 - 5.1 mmol/L Final     Chloride   Date Value Ref Range Status   03/25/2023 96 95 - 110 mmol/L Final     CO2   Date Value Ref Range Status   03/25/2023 24 23 - 29 mmol/L Final     BUN   Date Value Ref Range Status   03/25/2023 10 6 - 20 mg/dL Final     Creatinine   Date Value Ref Range Status   03/25/2023 0.8 0.5 - 1.4 mg/dL Final     Glucose   Date Value Ref Range Status   03/25/2023 98 70 - 110 mg/dL Final     Calcium   Date Value Ref Range Status   03/25/2023 9.3 8.7 - 10.5 mg/dL Final     LFTs:   Total Protein   Date Value Ref Range Status   03/25/2023 6.6 6.0 - 8.4 g/dL Final     Albumin   Date Value Ref Range Status   03/25/2023 4.1 3.5 - 5.2 g/dL Final     Total Bilirubin   Date Value Ref Range Status   03/25/2023 0.4 0.1 - 1.0 mg/dL Final     Comment:     For infants and newborns, interpretation of results should be based  on gestational age, weight and in agreement with clinical  observations.    Premature Infant recommended reference ranges:  Up to 24 hours.............<8.0 mg/dL  Up to 48 hours............<12.0 mg/dL  3-5 days..................<15.0 mg/dL  6-29 days.................<15.0 mg/dL       AST   Date Value Ref Range Status   03/25/2023 33 10 - 40 U/L Final     Alkaline Phosphatase   Date Value Ref Range Status   03/25/2023 62 55 - 135 U/L Final     ALT   Date Value Ref Range Status   03/25/2023 12 10 - 44 U/L Final     Coags: No results found for:  ""INR", "PROTIME", "PTT"  FLP:      Lab Results   Component Value Date    CHOL 169 02/15/2023    CHOL 264 (H) 12/14/2010    CHOL 229 (H) 09/22/2010     Lab Results   Component Value Date    HDL 55 02/15/2023    HDL 38 (L) 12/14/2010    HDL 37 (L) 09/22/2010     Lab Results   Component Value Date    LDLCALC 100.0 02/15/2023    LDLCALC 166.2 (H) 12/14/2010    LDLCALC Invalid, Trig > 400 09/22/2010     Lab Results   Component Value Date    TRIG 70 02/15/2023    TRIG 299 (H) 12/14/2010    TRIG 427 (H) 09/22/2010     Lab Results   Component Value Date    CHOLHDL 32.5 02/15/2023    CHOLHDL 14.4 (L) 12/14/2010    CHOLHDL 16.2 (L) 09/22/2010      DM:      Hemoglobin A1C   Date Value Ref Range Status   02/15/2023 5.3 4.0 - 5.6 % Final     Comment:     ADA Screening Guidelines:  5.7-6.4%  Consistent with prediabetes  >or=6.5%  Consistent with diabetes    High levels of fetal hemoglobin interfere with the HbA1C  assay. Heterozygous hemoglobin variants (HbS, HgC, etc)do  not significantly interfere with this assay.   However, presence of multiple variants may affect accuracy.       LDL Cholesterol   Date Value Ref Range Status   02/15/2023 100.0 63.0 - 159.0 mg/dL Final     Comment:     The National Cholesterol Education Program (NCEP) has set the  following guidelines (reference values) for LDL Cholesterol:  Optimal.......................<130 mg/dL  Borderline High...............130-159 mg/dL  High..........................160-189 mg/dL  Very High.....................>190 mg/dL       Creatinine   Date Value Ref Range Status   03/25/2023 0.8 0.5 - 1.4 mg/dL Final     Thyroid:   TSH   Date Value Ref Range Status   03/25/2023 1.012 0.400 - 4.000 uIU/mL Final     Free T4   Date Value Ref Range Status   02/15/2023 0.99 0.71 - 1.51 ng/dL Final     T4, Total   Date Value Ref Range Status   12/14/2010 6.7 4.5 - 11.5 ug/dl Final     T3, Total   Date Value Ref Range Status   11/09/2009 106 60 - 180 ng/dl Final     Anemia:   Vitamin B-12 " "  Date Value Ref Range Status   02/08/2010 536 210 - 950 pg/ml Final     Folate   Date Value Ref Range Status   02/08/2010 13.3 4.0 - 24.0 ng/ml Final     Cardiac: No results found for: "TROPONINI", "CKTOTAL", "CKMB", "BNP"  Urinalysis:   Color, UA   Date Value Ref Range Status   03/25/2023 Colorless (A) Yellow, Straw, Kristin Final     Specific Gravity, UA   Date Value Ref Range Status   03/25/2023 1.000 (A) 1.005 - 1.030 Final     Nitrite, UA   Date Value Ref Range Status   03/25/2023 Negative Negative Final     Ketones, UA   Date Value Ref Range Status   03/25/2023 Negative Negative Final     Urobilinogen, UA   Date Value Ref Range Status   06/22/2009 0.2 0 - 1 EU/DL Final       Other Results:  EKG (my interpretation):     Radiology:  X-Ray Toe 2 or More Views Left  Narrative: EXAMINATION:  XR TOE 2 OR MORE VIEWS LEFT    CLINICAL HISTORY:  Pain in left toe(s)    TECHNIQUE:  Three views of the left toes were performed    COMPARISON:  None.    FINDINGS:  Three views left toes.    There is edema about the 1st toe.  On the frontal view, there is vague obliquely oriented fracture involving the medial aspect of the distal phalanx of the 1st toe, fracture plane extends to the articular surface.  No dislocation.  No radiopaque foreign body.  Impression: 1. Findings concerning for fracture involving the distal phalanx of the 1st toe as described.    Electronically signed by: Sukhwinder Bustos MD  Date:    08/19/2024  Time:    19:18          Assessment/Plan     Emma Morales is a 59 y.o. female with:  1. Preventative health care  Assessment & Plan:  Get flu today  Get COVID, Shingrex    Schedule MMG at [facility]  Schedule colonoscopy with Dr. Boyd at [facility]     Schedule dental visit     Labs today    Orders:  -     (VFC) PCV20 (Prevnar 20) IM vaccine (>/= 6 wks)    2. Encounter for screening mammogram for breast cancer  Assessment & Plan:  Get flu today  Get COVID, Shingrex    Schedule MMG at [facility]  Schedule colonoscopy with Dr. Boyd " at      Schedule dental visit     Labs today    Orders:  -     Mammo Digital Screening Bilat w/ Andrzej; Future; Expected date: 09/18/2024    3. Closed nondisplaced fracture of distal phalanx of left great toe with routine healing  Assessment & Plan:  RICE  Apply Tylenol AR up to 3 times per day  Aspercreme roll-on up to 4 times per day       4. Tobacco use  Overview:  LDCT 3/2024 stable    Assessment & Plan:  Start Nicoderm 14mg patches and start nicorette lozenges;   -cont smoking cesstion program at [facility]       5. Vitamin D deficiency  Assessment & Plan:  Check level now      6. Hypothyroidism, unspecified type  Assessment & Plan:  Check level on Levo 75mcg daily       7. Chronic bilateral low back pain with bilateral sciatica  Overview:  Pt also with bilateral hip pain    Assessment & Plan:  Start Lidoderm 5% patch daily   Can take Tyelnol AR tid   Need to lose weight  Needs PT but prefers to defer at this time     Orders:  -     LIDOcaine (LIDODERM) 5 %; Place 2 patches onto the skin once daily. Remove & Discard patch within 12 hours or as directed by MD  Dispense: 60 patch; Refill: 5    8. Simple chronic bronchitis  Assessment & Plan:  Stop smoking!  Cont Breztri 2 puffs bid - newly prescribed by [facility] with albuterol prn     Orders:  -     budesonide-glycopyr-formoterol 160-9-4.8 mcg/actuation HFAA; Inhale 2 puffs into the lungs 2 (two) times daily.    9. Mixed hyperlipidemia  Assessment & Plan:  Check FLP on crestor 10mg daily and Vascepa 2 tabs po bid  - not fasting today      10. Laceration of left thigh, initial encounter  Assessment & Plan:  Keep clean and dry and then apply bactroban bid until resolved     Orders:  -     mupirocin (BACTROBAN) 2 % ointment; Apply topically 2 (two) times daily.  Dispense: 22 g; Refill: 3    11. Adjustment insomnia  Assessment & Plan:  Cont Trazodone 200mg po qhs       12. Bipolar 1 disorder  Overview:  Psych dr. Emma Davis    Assessment & Plan:  Cont   Seroquel 300mg bid, cymbalta 60mg bid; now Depakote 500mg qhs (stopped the 250mg qhs since too sleepy)    Check valproic acid level     Orders:  -     VALPROIC ACID; Future; Expected date: 09/18/2024    13. Family history of breast cancer in first degree relative  Overview:  Mom, maternal aunts with breast cancer    Orders:  -     Ambulatory referral/consult to Genetics; Future; Expected date: 09/18/2024    14. Need for vaccination  -     Influenza - Trivalent - PF (ADULT)    15. Osteopenia of neck of right femur  Overview:  DEXA:  10/2021:  osteopenia bilateral femoral neck (left -1.2; right -1.6)    Assessment & Plan:  Check Vit D level - likely needs to restart Vit D       16. Parkinson's disease, unspecified whether dyskinesia present, unspecified whether manifestations fluctuate  Assessment & Plan:  Cont propanolol, tizanidine daily, Sinemet tid and baclofen   F/u Dr. Kohler as scheduled               I spent a total of 44 minutes on the day of the visit.This includes face to face time and non-face to face time preparing to see the patient (eg, review of tests), obtaining and/or reviewing separately obtained history, documenting clinical information in the electronic or other health record, independently interpreting results and communicating results to the patient/family/caregiver, or care coordinator.   Code Status:     Annetta Roe MD History of Present Illness

## 2024-09-18 NOTE — ASSESSMENT & PLAN NOTE
Start Lidoderm 5% patch daily   Can take Tyelnol AR tid   Need to lose weight  Needs PT but prefers to defer at this time

## 2024-09-18 NOTE — ASSESSMENT & PLAN NOTE
Stop smoking!  Cont Breztri 2 puffs bid - newly prescribed by [facility] with albuterol prn

## 2024-09-19 ENCOUNTER — TELEPHONE (OUTPATIENT)
Dept: PRIMARY CARE CLINIC | Facility: CLINIC | Age: 59
End: 2024-09-19
Payer: MEDICARE

## 2024-09-19 NOTE — TELEPHONE ENCOUNTER
----- Message from Manjula Luu LPN sent at 9/18/2024 12:26 PM CDT -----  Contact: [phone]    ----- Message -----  From: Raheem Davis  Sent: 9/18/2024  12:17 PM CDT  To: Annetta Roe Staff    [email or web address]     Patient is calling for Medical Advice regarding: Pt said she needs a call back about the LIDOcaine (LIDODERM) 5 % she needs a PA     How long has patient had these symptoms:    Pharmacy name and phone#:   [id number] - [place] - [address] AT [facility] [address] [facility]  [address]  [place]   [place] [address]  Phone: [phone] Fax: [phone]    Patient wants a call back or thru [facility]:call back     Comments: Pt is at [facility] waiting     Please advise patient replies from provider may take up to 48 hours.

## 2024-09-20 ENCOUNTER — TELEPHONE (OUTPATIENT)
Dept: PRIMARY CARE CLINIC | Facility: CLINIC | Age: 59
End: 2024-09-20
Payer: MEDICARE

## 2024-09-20 NOTE — TELEPHONE ENCOUNTER
Pt wants to know if vitamins such as B6, C , collagen + biotin, B12, D3 , D2, & glucosamine chondroitin  would affect her med usage or interfere with their intended effect per say.

## 2024-09-20 NOTE — TELEPHONE ENCOUNTER
----- Message from Vivian Mcgill MA sent at 9/20/2024  3:15 PM CDT -----  Contact: [phone]    ----- Message -----  From: Yusra Conley  Sent: 9/20/2024   9:55 AM CDT  To: Annetta Roe Staff    Delmy hebert is calling to get more info for prior auth for Lidocaine pad 5 percent please advise     Ref MOLLY [id number]  Needs by 09/22 will be denied if not received by then

## 2024-09-20 NOTE — TELEPHONE ENCOUNTER
----- Message from Crystal Colón sent at 9/20/2024  3:18 PM CDT -----  Contact: [phone] Patient  Pt is calling in regards to having some questions about taking some vitamins and supplements with her medications. Please call and advise. Thank you

## 2024-09-23 ENCOUNTER — TELEPHONE (OUTPATIENT)
Dept: PRIMARY CARE CLINIC | Facility: CLINIC | Age: 59
End: 2024-09-23
Payer: MEDICARE

## 2024-09-23 NOTE — TELEPHONE ENCOUNTER
Pt had concerns regarding previous lab work (valproic acid; she thinks her blood sugar is low and would also like a response regarding vitamin intake.

## 2024-09-26 ENCOUNTER — PATIENT MESSAGE (OUTPATIENT)
Dept: HEMATOLOGY/ONCOLOGY | Facility: CLINIC | Age: 59
End: 2024-09-26
Payer: MEDICARE

## 2024-09-26 ENCOUNTER — TELEPHONE (OUTPATIENT)
Dept: HEMATOLOGY/ONCOLOGY | Facility: CLINIC | Age: 59
End: 2024-09-26
Payer: MEDICARE

## 2024-09-30 ENCOUNTER — CLINICAL SUPPORT (OUTPATIENT)
Dept: SMOKING CESSATION | Facility: CLINIC | Age: 59
End: 2024-09-30
Payer: COMMERCIAL

## 2024-09-30 DIAGNOSIS — F17.200 NICOTINE DEPENDENCE: Primary | ICD-10-CM

## 2024-09-30 PROCEDURE — 99999 PR PBB SHADOW E&M-EST. PATIENT-LVL II: CPT | Mod: PBBFAC,,,

## 2024-09-30 PROCEDURE — 99402 PREV MED CNSL INDIV APPRX 30: CPT | Mod: S$GLB,,,

## 2024-09-30 NOTE — PROGRESS NOTES
Individual Follow-Up Form    9/30/2024    Quit Date:     Clinical Status of Patient: Outpatient    Length of Service: 30 minutes    Continuing Medication: yes  Patches or Nicotine Lozenges    Other Medications:      Target Symptoms: Withdrawal and medication side effects. The following were  rated moderate (3) to severe (4) on TCRS:  Moderate (3): desire, crave  Severe (4): none    Comments: Spoke to patient this afternoon in regard to smoking cessation progress update. Patient continues to smoke on average 6-7 cpd. Patient has not started wearing patch, she states that she will try and start them tomorrow. She states that she does not have strong urges and able to put off smoking for several hours, she stays busy on her phone through out the day. She does not smoke at any particular times.  Encouraged her to set a goal of 4-5 over the next 2 weeks. Reviewed strategies, cues, and triggers. Introduced the negative impact of tobacco on health, the health advantages of discontinuing the use of tobacco, time line improved health changes after a quit, withdrawal issues to expect from nicotine and habit, and ways to achieve the goal of a quit. The patient will continue with  therapy sessions and medication monitoring by CTTS. Prescribed medication management will be by physician. The patient denies any abnormal behavioral or mental changes at this time.         Diagnosis: F17.200    Next Visit: 2 weeks

## 2024-10-09 DIAGNOSIS — E78.5 HYPERLIPIDEMIA, UNSPECIFIED HYPERLIPIDEMIA TYPE: ICD-10-CM

## 2024-10-09 NOTE — TELEPHONE ENCOUNTER
Care Due:                  Date            Visit Type   Department     Provider  --------------------------------------------------------------------------------                                EP -                              PRIMARY      [facility]  Last Visit: 09-      CARE ([facility])   Care           Annetta Roe  Next Visit: None Scheduled  None         None Found                                                            Last  Test          Frequency    Reason                     Performed    Due Date  --------------------------------------------------------------------------------    CMP.........  12 months..  icosapent, rosuvastatin..  03- 03-    Lipid Panel.  12 months..  icosapent, rosuvastatin..  02-   02-    TSH.........  12 months..  levothyroxine............  03- 03-    Health [facility] Embedded Care Due Messages. Reference number: [id number].   10/09/2024 2:49:41 PM CDT

## 2024-10-10 RX ORDER — ROSUVASTATIN CALCIUM 10 MG/1
10 TABLET, COATED ORAL
Qty: 90 TABLET | Refills: 3 | Status: SHIPPED | OUTPATIENT
Start: 2024-10-10

## 2024-10-10 NOTE — TELEPHONE ENCOUNTER
Refill Routing Note   Medication(s) are not appropriate for processing by [facility] for the following reason(s):        Required labs outdated  No active prescription written by provider    [facility] action(s):  Defer     Requires labs : Yes             Appointments  past 12m or future 3m with PCP    Date Provider   Last Visit   9/18/2024 Annetta Roe MD   Next Visit   Visit date not found Annetta Roe MD   ED visits in past 90 days: 0        Note composed:11:43 PM 10/09/2024

## 2024-10-21 NOTE — ED NOTES
Patient Seen in: [facility] [place]      History     Chief Complaint   Patient presents with    Cough     Concerned about walking pneumonia - Entered by patient    Sore Throat     Stated Complaint: Cough - Concerned about walking pneumonia    Subjective:   HPI      Patient is a 6-year-old female, immunizations up-to-date, no significant past medical history, accompanied by father, presenting to immediate care for evaluation of cold-like symptoms for 1 week.  Symptoms worsening.  Associated low-grade fever with nasal congestion, chest congestion, cough, sore throat.  Giving over-the-counter children's Zyrtec for congestion and cough medicine with minimal improvement.  No recent travel.  Not immunocompromise.  No cardiopulmonary history.  Concern for possible infection including strep throat or walking pneumonia.    Objective:     History reviewed. No pertinent past medical history.           History reviewed. No pertinent surgical history.             Social History     Socioeconomic History    Marital status: Single   Tobacco Use    Smoking status: Never     Passive exposure: Never    Smokeless tobacco: Never   Vaping Use    Vaping status: Never Used   Substance and Sexual Activity    Alcohol use: Never    Drug use: Never     Social Drivers of Health     Food Insecurity: Low Risk  (9/23/2024)    Received from [facility]    Food Insecurity     Have there been times that your food ran out, and you didn't have money to get more?: No     Are there times that you worry that this might happen?: No   Transportation Needs: Low Risk  (9/23/2024)    Received from [facility]    Transportation Needs     Do you have trouble getting transportation to medical appointments?: No     How do you normally get to and from your appointments?: Family/Friend              Review of Systems   Constitutional:  Negative for fever.   HENT:  Positive for congestion and sore throat. Negative for  Envelope containing $1878 & envelope containing $21 reviewed with patient. Security present. Pt verified. Placed in safe.   facial swelling and trouble swallowing.    Respiratory:  Positive for cough. Negative for shortness of breath.    Gastrointestinal:  Negative for abdominal pain, diarrhea and vomiting.   Musculoskeletal:  Negative for neck pain and neck stiffness.   Skin:  Negative for rash.   Allergic/Immunologic: Negative for immunocompromised state.   Neurological:  Negative for weakness.   Psychiatric/Behavioral:  Negative for confusion.    All other systems reviewed and are negative.      Positive for stated complaint: Cough - Concerned about walking pneumonia  Other systems are as noted in HPI.  Constitutional and vital signs reviewed.      All other systems reviewed and negative except as noted above.    Physical Exam     ED Triage Vitals [10/21/24 0921]   /56   Pulse 94   Resp 22   Temp 98.4 °F (36.9 °C)   Temp src Temporal   SpO2 100 %   O2 Device None (Room air)       Current Vitals:   Vital Signs  BP: 103/56  Pulse: 94  Resp: 22  Temp: 98.4 °F (36.9 °C)  Temp src: Temporal    Oxygen Therapy  SpO2: 100 %  O2 Device: None (Room air)        Physical Exam  Vitals and nursing note reviewed.   Constitutional:       General: She is not in acute distress.     Appearance: She is well-developed. She is not ill-appearing or toxic-appearing.      Comments: Alert, cooperative, pleasant, nontoxic-appearing   HENT:      Head: Normocephalic and atraumatic.      Right Ear: Tympanic membrane normal.      Left Ear: Tympanic membrane normal.      Nose: Congestion present.      Mouth/Throat:      Mouth: Mucous membranes are moist.      Pharynx: No oropharyngeal exudate or posterior oropharyngeal erythema.      Tonsils: No tonsillar exudate or tonsillar abscesses.      Comments: Postnasal drip.  Tonsils not enlarged without erythema or exudates.  Eyes:      Conjunctiva/sclera: Conjunctivae normal.   Cardiovascular:      Rate and Rhythm: Normal rate and regular rhythm.      Heart sounds: Normal heart sounds.   Pulmonary:      Comments:  Lung sounds diminished with Rales left lower lobe and adventitious lung sounds left upper lobe.  No conversational dyspnea.  No retractions.  No increased work of breathing.  Abdominal:      General: Bowel sounds are normal.      Palpations: Abdomen is soft.      Tenderness: There is no abdominal tenderness.   Musculoskeletal:         General: No swelling or tenderness. Normal range of motion.      Cervical back: Normal range of motion. No rigidity.   Skin:     Capillary Refill: Capillary refill takes less than 2 seconds.      Coloration: Skin is not cyanotic, jaundiced, mottled or pale.      Findings: No erythema or rash.   Neurological:      General: No focal deficit present.      Mental Status: She is alert.   Psychiatric:         Mood and Affect: Mood normal.         Behavior: Behavior normal.           ED Course     Labs Reviewed   POCT RAPID STREP - Normal   GRP A STREP CULT, THROAT     Results for orders placed or performed during the hospital encounter of 10/21/24   POCT Rapid Strep    Collection Time: 10/21/24  9:33 AM   Result Value Ref Range    POCT Rapid Strep Negative Negative          [facility]     Patient is a 6-year-old female, immunizations up-to-date, [place] by father, presenting to immediate care for evaluation of worsening cough and cold-like symptoms for 1 week.  Associated low-grade fever.  Patient is alert, cooperative, pleasant, nontoxic appearing.  Afebrile.  Not tachycardic not hypoxic.  Exam notable for nasal congestion postnasal drip.  No nuchal rigidity.  Normal cardiac exam regular rate and rhythm.  Lungs diminished with Rales left lower lobe and adventitious lung sounds concerning for lower respiratory bacterial infection-pneumonia.  Due to current illness, do not have x-ray tech available in our clinic.  Did offer outpatient x-ray to father/patient.  However given clinical context concerning for community-acquired pneumonia and lower respiratory infection given abnormal lung sounds and  worsening cold symptoms.  Patient is nontoxic-appearing.  Will treat outpatient supportively.  Prescription for amoxicillin twice daily for 7 days for treatment acute lower respiratory infection.  Further PCP follow-up for resolution of symptoms.  Return precautions discussed        Medical Decision Making      Disposition and Plan     Clinical Impression:  1. Acute lower respiratory infection    2. Acute cough         Disposition:  Discharge  10/21/2024  9:56 am    Follow-up:  Madiha Bryan  [address] [address] [phone]                Medications Prescribed:  Discharge Medication List as of 10/21/2024  9:57 AM        START taking these medications    Details   Amoxicillin 400 MG/5ML Oral Recon Susp Take 17 mL (1,360 mg total) by mouth every 12 (twelve) hours for 7 days., Normal, Disp-238 mL, R-045 mg/kg twice daily for 7 days for community-acquired pneumonia                 Supplementary Documentation:

## 2024-10-23 ENCOUNTER — TELEPHONE (OUTPATIENT)
Dept: SMOKING CESSATION | Facility: CLINIC | Age: 59
End: 2024-10-23
Payer: MEDICARE

## 2024-10-23 NOTE — TELEPHONE ENCOUNTER
[facility]- called patient in regards to scheduled telephonic/clinic appointment today. Left message for patient to call back for Smoking Cessation progress update or to reschedule.  Margot Verduzco, [facility]  [phone] or [phone].

## 2024-10-24 ENCOUNTER — CLINICAL SUPPORT (OUTPATIENT)
Dept: SMOKING CESSATION | Facility: CLINIC | Age: 59
End: 2024-10-24
Payer: COMMERCIAL

## 2024-10-24 DIAGNOSIS — F17.200 NICOTINE DEPENDENCE: ICD-10-CM

## 2024-10-24 PROCEDURE — 99402 PREV MED CNSL INDIV APPRX 30: CPT | Mod: S$GLB,,,

## 2024-10-24 PROCEDURE — 99999 PR PBB SHADOW E&M-EST. PATIENT-LVL II: CPT | Mod: PBBFAC,,,

## 2024-10-24 RX ORDER — IBUPROFEN 200 MG
1 TABLET ORAL DAILY
Qty: 28 PATCH | Refills: 0 | Status: SHIPPED | OUTPATIENT
Start: 2024-10-24

## 2024-10-24 NOTE — PROGRESS NOTES
Individual Follow-Up Form    10/24/2024    Quit Date:     Clinical Status of Patient: Outpatient    Length of Service: 30 minutes    Continuing Medication: yes  Patches or Nicotine Lozenges    Other Medications:      Target Symptoms: Withdrawal and medication side effects. The following were  rated moderate (3) to severe (4) on TCRS:  Moderate (3): crave  Severe (4): none    Comments: Spoke to patient this morning in regard to smoking cessation progress update. Patient reported that she is smoking 5-6 cpd. Patient waiting in the morning several hours. Patient has a target quit date of Thanksgiving. Commended patient on her progress. Patient has been wearing patches more consistently. Suggested that she break up her cigarettes in time frame 2,1,2. Patient will start using lozenges in place of cigarettes through out the day. The patient will continue with  therapy sessions and medication monitoring by CTTS. Prescribed medication management will be by physician. The patient remains on 14 mg Nicotine patch the prescribed tobacco cessation medication regimen of without any negative side effects at this time.  The patient denies any abnormal behavioral or mental changes at this time.        Diagnosis: F17.200    Next Visit: 2 weeks

## 2024-11-07 ENCOUNTER — TELEPHONE (OUTPATIENT)
Dept: SMOKING CESSATION | Facility: CLINIC | Age: 59
End: 2024-11-07
Payer: MEDICARE

## 2024-11-07 NOTE — TELEPHONE ENCOUNTER
Smoking [facility]ation [facility]- called patient in regards to scheduled telephonic appointment today. Left message for patient to call back for Smoking Cessation progress update or to reschedule.  Margot Verduzco, [facility]  [phone] or [phone].

## 2024-11-12 ENCOUNTER — CLINICAL SUPPORT (OUTPATIENT)
Dept: SMOKING CESSATION | Facility: CLINIC | Age: 59
End: 2024-11-12
Payer: COMMERCIAL

## 2024-11-12 DIAGNOSIS — F17.200 NICOTINE DEPENDENCE: Primary | ICD-10-CM

## 2024-11-12 PROCEDURE — 99402 PREV MED CNSL INDIV APPRX 30: CPT | Mod: S$GLB,,,

## 2024-11-12 PROCEDURE — 99999 PR PBB SHADOW E&M-EST. PATIENT-LVL II: CPT | Mod: PBBFAC,,,

## 2024-11-12 NOTE — PROGRESS NOTES
Individual Follow-Up Form    11/12/2024    Quit Date:     Clinical Status of Patient: Outpatient    Length of Service: 30 minutes    Continuing Medication: yes  Patches or Nicotine Lozenges    Other Medications:      Target Symptoms: Withdrawal and medication side effects. The following were  rated moderate (3) to severe (4) on TCRS:  Moderate (3): urge, craving  Severe (4): none    Comments: Spoke to patient this afternoon in regard to missed telephonic phone follow up on 11/7/24. Patient reported that she was helping some friends. She had increased her smoking from 6 -10 cpd. Patient was feeling stressed with her friends trying to help them, and found herself smoking more and did not have her patch on. Patient stated that she is trying to get back on track and back down to 6-7 cpd. Encouraged patient to stay focused and be consistent with wearing patch. Discussed and reviewed patient Sm[employer]essation [employer] benefits and patch limit. Reinforced changes and commitment talk while respecting and maintaining patient self direction. Validated patients experience and lack of readiness. Normalized ambivalence about change. Reviewed and discussed using lozenges in place of cigarettes. She stated that she would try and start using them. The patient will continue with  therapy sessions and medication monitoring by CTTS. Prescribed medication management will be by physician. The patient remains on  the prescribed tobacco cessation medication regimen of 14 mg nicotine patch and 2 mg nicotine lozengeswithout any negative side effects at this time.  The patient denies any abnormal behavioral or mental changes at this time.       Diagnosis: F17.200    Next Visit: 2 weeks

## 2024-11-19 RX ORDER — ICOSAPENT ETHYL 1 G/1
2 CAPSULE ORAL 2 TIMES DAILY
Qty: 360 CAPSULE | Refills: 3 | Status: SHIPPED | OUTPATIENT
Start: 2024-11-19

## 2024-11-19 NOTE — TELEPHONE ENCOUNTER
No care due was identified.  Health [facility] Embedded Care Due Messages. Reference number: [id number].   11/19/2024 2:18:17 AM CST   Impaired gait

## 2024-11-19 NOTE — TELEPHONE ENCOUNTER
Refill Routing Note   Medication(s) are not appropriate for processing by [facility] for the following reason(s):        Required labs outdated    [facility] action(s):  Defer               Appointments  past 12m or future 3m with PCP    Date Provider   Last Visit   9/18/2024 Annetta Roe MD   Next Visit   Visit date not found Annetta Roe MD   ED visits in past 90 days: 0        Note composed:7:47 AM 11/19/2024

## 2024-11-25 ENCOUNTER — TELEPHONE (OUTPATIENT)
Dept: SMOKING CESSATION | Facility: CLINIC | Age: 59
End: 2024-11-25
Payer: MEDICARE

## 2024-11-25 DIAGNOSIS — F17.200 NICOTINE DEPENDENCE: ICD-10-CM

## 2024-11-25 RX ORDER — IBUPROFEN 200 MG
1 TABLET ORAL DAILY
Qty: 28 PATCH | Refills: 0 | Status: SHIPPED | OUTPATIENT
Start: 2024-11-25

## 2024-11-25 NOTE — TELEPHONE ENCOUNTER
Patient contacted me for an inquirey about NRT an her CTTS.  Her CTTS, Margot Verduzco, was contacted with confirmation to to reach out to patient.

## 2024-11-26 ENCOUNTER — TELEPHONE (OUTPATIENT)
Dept: SMOKING CESSATION | Facility: CLINIC | Age: 59
End: 2024-11-26
Payer: MEDICARE

## 2024-11-26 NOTE — TELEPHONE ENCOUNTER
Smoking [facility]essation [facility]- called patient in regards to scheduled telephonic/clinic appointment today. Left message for patient to call back for Smoking Cessation progress update or to reschedule.  Margot Verduzco, [facility]  [phone] or [phone].

## 2024-12-16 ENCOUNTER — TELEPHONE (OUTPATIENT)
Dept: SMOKING CESSATION | Facility: CLINIC | Age: 59
End: 2024-12-16
Payer: MEDICARE

## 2025-01-29 ENCOUNTER — TELEPHONE (OUTPATIENT)
Dept: PRIMARY CARE CLINIC | Facility: CLINIC | Age: 60
End: 2025-01-29
Payer: MEDICARE

## 2025-01-29 NOTE — TELEPHONE ENCOUNTER
----- Message from Mario sent at 1/29/2025 11:10 AM CST -----  Contact: [phone]@[email or web address]  Good morning patient would like a call back from the doc to discuss her liver. Please call patient to advise  [phone]

## 2025-02-20 ENCOUNTER — TELEPHONE (OUTPATIENT)
Dept: SMOKING CESSATION | Facility: CLINIC | Age: 60
End: 2025-02-20
Payer: MEDICARE

## 2025-03-06 ENCOUNTER — PATIENT MESSAGE (OUTPATIENT)
Dept: PRIMARY CARE CLINIC | Facility: CLINIC | Age: 60
End: 2025-03-06
Payer: MEDICARE

## 2025-03-06 ENCOUNTER — TELEPHONE (OUTPATIENT)
Dept: PRIMARY CARE CLINIC | Facility: CLINIC | Age: 60
End: 2025-03-06
Payer: MEDICARE

## 2025-03-06 RX ORDER — METHOCARBAMOL 500 MG/1
500 TABLET, FILM COATED ORAL 3 TIMES DAILY
Qty: 90 TABLET | Refills: 3 | Status: SHIPPED | OUTPATIENT
Start: 2025-03-06

## 2025-03-06 RX ORDER — METHOCARBAMOL 500 MG/1
TABLET, FILM COATED ORAL
Qty: 30 TABLET | Refills: 1 | Status: SHIPPED | OUTPATIENT
Start: 2025-03-06 | End: 2025-03-06 | Stop reason: SDUPTHER

## 2025-03-06 NOTE — TELEPHONE ENCOUNTER
Care Due:                  Date            Visit Type   Department     Provider  --------------------------------------------------------------------------------                                EP -                              PRIMARY      [facility]  Last Visit: 09-      CARE ([facility])   Care           Annetta Roe  Next Visit: None Scheduled  None         None Found                                                            Last  Test          Frequency    Reason                     Performed    Due Date  --------------------------------------------------------------------------------    CMP.........  12 months..  icosapent, rosuvastatin..  03- 03-    Lipid Panel.  12 months..  icosapent, rosuvastatin..  02-   02-    TSH.........  12 months..  levothyroxine............  03- 03-    [facility] [employer] Embedded Care Due Messages. Reference number: [id number].   3/06/2025 3:05:31 PM CST

## 2025-03-06 NOTE — TELEPHONE ENCOUNTER
----- Message from Mary sent at 3/6/2025  3:00 PM CST -----  Contact: [facility]/Marie/[phone]  Pharmacy is calling to clarify an RX.RX name:  Methocarbamol  500 mg What do they need to clarify:  Comments: Marie said pt is requesting a 90 day supply instead of the 10 day supply the doctor sent in . Please advise

## 2025-03-08 DIAGNOSIS — E03.9 HYPOTHYROIDISM, UNSPECIFIED TYPE: ICD-10-CM

## 2025-03-08 NOTE — TELEPHONE ENCOUNTER
No care due was identified.  Health [facility] Embedded Care Due Messages. Reference number: [id number].   3/08/2025 2:09:59 AM CST

## 2025-03-09 RX ORDER — LEVOTHYROXINE SODIUM 75 UG/1
75 TABLET ORAL
Qty: 90 TABLET | Refills: 0 | Status: SHIPPED | OUTPATIENT
Start: 2025-03-09

## 2025-03-09 NOTE — TELEPHONE ENCOUNTER
Refill Routing Note   Medication(s) are not appropriate for processing by [facility] for the following reason(s):        Required labs outdated    [facility] action(s):  Defer             Appointments  past 12m or future 3m with PCP    Date Provider   Last Visit   9/18/2024 Annetta Roe MD   Next Visit   Visit date not found Annetta Roe MD   ED visits in past 90 days: 0        Note composed:5:37 PM 03/09/2025

## 2025-03-10 ENCOUNTER — CLINICAL SUPPORT (OUTPATIENT)
Dept: SMOKING CESSATION | Facility: CLINIC | Age: 60
End: 2025-03-10
Payer: COMMERCIAL

## 2025-03-10 DIAGNOSIS — F17.200 NICOTINE DEPENDENCE: Primary | ICD-10-CM

## 2025-03-10 PROCEDURE — 99407 BEHAV CHNG SMOKING > 10 MIN: CPT | Mod: S$GLB,,,

## 2025-03-10 NOTE — PROGRESS NOTES
Spoke with patient today in regard to smoking cessation progress for 6 month phone follow up on quit 2. Patient not tobacco free at this time. Patient has scheduled an appointment to return to the program for Quit attempt #3. Informed patient of benefit period, future follow ups, and contact information if any further help or support is needed.  Will complete smart form on Quit attempt #2.

## 2025-03-11 ENCOUNTER — TELEPHONE (OUTPATIENT)
Dept: HEMATOLOGY/ONCOLOGY | Facility: CLINIC | Age: 60
End: 2025-03-11
Payer: MEDICARE

## 2025-03-11 NOTE — TELEPHONE ENCOUNTER
Called and spoke to patient to confirm the in office appointment on 3/18/25 and to complete the genetic questionnaire.

## 2025-03-17 ENCOUNTER — CLINICAL SUPPORT (OUTPATIENT)
Dept: SMOKING CESSATION | Facility: CLINIC | Age: 60
End: 2025-03-17
Payer: COMMERCIAL

## 2025-03-17 DIAGNOSIS — F17.200 NICOTINE DEPENDENCE: Primary | ICD-10-CM

## 2025-03-17 PROCEDURE — 99404 PREV MED CNSL INDIV APPRX 60: CPT | Mod: S$GLB,,,

## 2025-03-17 PROCEDURE — 99999 PR PBB SHADOW E&M-EST. PATIENT-LVL II: CPT | Mod: PBBFAC,,,

## 2025-03-17 RX ORDER — IBUPROFEN 200 MG
1 TABLET ORAL DAILY
Qty: 28 PATCH | Refills: 0 | Status: SHIPPED | OUTPATIENT
Start: 2025-03-17

## 2025-03-17 RX ORDER — DM/P-EPHED/ACETAMINOPH/DOXYLAM 30-7.5/3
2 LIQUID (ML) ORAL
Qty: 144 LOZENGE | Refills: 0 | Status: SHIPPED | OUTPATIENT
Start: 2025-03-17

## 2025-03-17 NOTE — PROGRESS NOTES
Quit intake # 3 was done by phone this morning due to transportation issues. Patient reports smoking 1/2 pack of cigarettes a day. FTND score of 1 indicates a low level of nicotine dependence. TWNI-D score of 17 perceived as some degree of mental distress / possible depression. Patient will begin the prescribed tobacco cessation medication regimen of 14 mg Nicotine patch and 2 mg Nicotine lozenges . Discussed and reviewed with the patient regarding NRT's and medication along with behavioral therapy & counseling to assist patient with meeting her goal of being smoke free. Patient is agreeable to participate in bi-weekly sessions as well as group therapy when it is available. Patient educated on role & usage possible side effects. Encourage patient to keep smoking diary to log her daily cigarette usage. Reviewed behavioral modification strategy of rate reduction and wait time of 15 min prior to smoking. Patient verbalized understanding & willingness to apply. Patient instructed to call TTS with any questions or concerns.

## 2025-03-18 ENCOUNTER — OFFICE VISIT (OUTPATIENT)
Dept: HEMATOLOGY/ONCOLOGY | Facility: CLINIC | Age: 60
End: 2025-03-18
Payer: MEDICARE

## 2025-03-18 ENCOUNTER — LAB VISIT (OUTPATIENT)
Dept: LAB | Facility: HOSPITAL | Age: 60
End: 2025-03-18
Payer: MEDICARE

## 2025-03-18 ENCOUNTER — TELEPHONE (OUTPATIENT)
Dept: HEMATOLOGY/ONCOLOGY | Facility: CLINIC | Age: 60
End: 2025-03-18
Payer: MEDICARE

## 2025-03-18 DIAGNOSIS — Z71.83 ENCOUNTER FOR NONPROCREATIVE GENETIC COUNSELING: Primary | ICD-10-CM

## 2025-03-18 DIAGNOSIS — Z80.41 FAMILY HISTORY OF OVARIAN CANCER: ICD-10-CM

## 2025-03-18 DIAGNOSIS — Z80.3 FAMILY HISTORY OF BREAST CANCER: ICD-10-CM

## 2025-03-18 PROCEDURE — 99205 OFFICE O/P NEW HI 60 MIN: CPT | Mod: S$GLB,,, | Performed by: PHYSICIAN ASSISTANT

## 2025-03-18 PROCEDURE — 36415 COLL VENOUS BLD VENIPUNCTURE: CPT | Performed by: PHYSICIAN ASSISTANT

## 2025-03-18 PROCEDURE — 99999 PR PBB SHADOW E&M-EST. PATIENT-LVL II: CPT | Mod: PBBFAC,,, | Performed by: PHYSICIAN ASSISTANT

## 2025-03-18 NOTE — PROGRESS NOTES
Hereditary/High Risk Clinic  Department of Hematology and Oncology  [facility]    Cancer Genetics  Initial Consultation    Date of Service:  3/18/25  Visit Provider:  Nadege Velasquez PA-C  Collaborating Physician:  Haley Owusu MD    Patient:  Emma Morales  :  1965  MRN:   [id number]     Referring Provider    Annetta Roe MD  [address]  [place] [address]    Approximately 45 minutes were spent face-to-face with the patient.  Approximately 70 minutes in total were spent on this encounter, which includes face-to-face time and non-face-to-face time preparing to see the patient (e.g., review of tests), obtaining and/or reviewing separately obtained history, documenting clinical information in the electronic or other health record, independently interpreting results (not separately reported) and communicating results to the patient/family/caregiver, or care coordination (not separately reported).     ASSESSMENT   Emma Morales's personal/family history is significant for:   Emma: No cancers  Paternal family:   MGF: Breast and ovarian cancer 88,   Paternal cousin: Breast cancer 50s  Maternal family:   Mother: Hx lumps in breasts, mastectomy done due to FH breast cancer.  Maternal aunt: Breast cancer 50s,  70s  Maternal aunt: Breast cancer 70s,  70s  MGM: Breast cancer 60s or later,   Maternal cousins (unaffected aunt's daughters) reportedly underwent genetic testing that was positive for a mutation. Emma will try to get their results.     Emma is not the best historian. According to the information she provided, this history is slightly suggestive of a potential hereditary predisposition to cancer and meets current clinical guidelines for genetic testing.      The recommendation is to proceed with germline testing to include the genes commonly associated with hereditary breast and ovarian cancer. Emma was given the option of proceeding with testing now,  "deferring testing to a later date, or declining testing and opted to proceed.     PLAN     1. Encounter for nonprocreative genetic counseling    2. Family history of breast cancer  - Ambulatory referral/consult [facility] Genetics  [facility]; Future    3. Family history of ovarian cancer  - [facility] - Hereditary Cancer with RNA; Future    - Proceed with germline genetic testing as noted below.   - Follow up for results review, post-test genetic counseling.    Genetics lab: [facility]        Genetic test: [facility] +RNAinsight   Indication/ICD Codes:  FH of breast cancer in 3 or more relatives on the same side of the family (Z80.3)  FH ovarian cancer (Z80,41)   Specimen type: Blood   Specimen collection by: [facility]    Specimen collection date: 3/18/25   Results expected by: Approximately 3 weeks after the genetic testing lab receives the specimen   Results disclosure plan: Notification by genetics team and post-test visit    Verbal informed consent: Obtained       Questions were encouraged and answered to the patient's satisfaction, and she verbalized understanding of information and agreement with the plan.       SUBJECTIVE   HPI:  Emma Morales is a 59 y.o. assigned female at birth who is new to the [facility] Department of Hematology and Oncology and to me.  She presents for genetic counseling and cancer risk assessment due to her personal and/or family history of cancer.     Patient reports a distress score of 10 due to back pain and difficulty getting around due to arthritis and Parkinson's. No mental health referral is needed. Patient is already connected for management of bipolar disorder.     Focused personal history:   Germline cancer-genetic testing: no  Personal history of cancer:  no  Benign tumors:  no  Blood disorder:  no  Pancreatitis:  no    Breast-Specific History  Age: 59 years  Height:  5' 4"  Weight:  225 pounds  Breast density per BI-RADS:  scattered " "fibroglandular densities  Age at menarche: 10 years  Age at first live birth: No births  Uterus and ovaries intact: No, s/p supracervical hysterectomy/BSO (2008)   Menopause:  postmenopausal  HRT usage:  never  BRCA testing:  No prior testing   Personal history of ovarian cancer:  No  Personal history of breast biopsy: Mammogram shows bilateral breast biopsy clips. Patient notes she had an abscess removed from her left lateral breast/chest wall in 2014 but no breast biopsies.   History of XRT to chest wall:  No    Cancer Screening:   Colonoscopy: 2/2025 - Per patient 9 (1-3 mm) polyps, some were adenomas. No report to review.   Well woman exam: "A long time ago"  Mammogram: 3/2025 biopsy clips and benign-appearing masses in both breasts, TC 14.7% (average risk).    Family History  Paternal ancestry: Syrian  Maternal ancestry: Turkmen   Ashkenazi Anabaptist:  No  Consanguinity:  No  Hereditary cancer genetic testing in blood relatives:  No        A larger copy of the pedigree is available in Media.     Past Medical History:   Diagnosis Date    Abscess of chest wall 06/30/2014    Bipolar 1 disorder     Candidal dermatitis 06/15/2023    Housing instability after recent homelessness 06/15/2023    Hyperglycemia 02/15/2023    Parkinson disease     Spinal injury      Social History     Tobacco Use    Smoking status: Every Day     Current packs/day: 1.50     Average packs/day: 1.5 packs/day for 45.2 years (67.8 ttl pk-yrs)     Types: Cigarettes     Start date: 1/1/1980    Smokeless tobacco: Never    Tobacco comments:     Patches and Lozenges   Substance Use Topics    Alcohol use: No     Family History   Problem Relation Name Age of Onset    Other (mastectomy) Mother Yamilet         for benign breast lumps due to family history    Macular degeneration Mother Yamilet     Hypertension Mother Yamilet     Heart disease Father Shun     Early death Maternal Grandfather      Breast cancer Maternal Grandmother  60 - 69        60s or later    " Cancer Paternal Grandfather  59        chest or brain?    Ovarian cancer Paternal Grandmother  88        possibly    Breast cancer Paternal Grandmother  88    Breast cancer Paternal Cousin Sherly 50 - 59    Breast cancer Maternal Aunt Jocelyn 50 - 59        metastatic recurrence in her 70s    Breast cancer Maternal Aunt Jinny 70 - 79    Heart attack Maternal Uncle Abdiaziz     Other (breast surgery) Maternal Cousin      Colon cancer Maternal Cousin        Review of Systems  See HPI.      OBJECTIVE   Physical Exam  Very pleasant patient.  Unaccompanied  Vitals signs:  There were no vitals filed for this visit.   Constitutional: No apparent distress.   Pulmonary: Normal effort  Neurological: Alert and oriented. No obvious neurological deficits.   Psychiatric: Normal mood, affect, thought content, speech, behavior, judgment.    COUNSELING     CANCER GENETICS    Cancer is caused by an accumulation of genetic mutations that allow cells to grow and divide uncontrollably to form a tumor. Individuals with certain risk factors are more likely to develop genetic mutations that lead to cancer.      Age: Advancing age is the most important risk factor for cancer overall and for many individual cancer types.    Alcohol: Drinking alcohol can increase your risk of cancer of the mouth, throat, esophagus, larynx, liver, and breast.    Smoking: Tobacco contains benzene and other chemicals and can cause cancer of the lung, larynx, mouth, esophagus, throat, bladder, kidney, liver, stomach, pancreas, colon and rectum, and cervix, as well as acute myeloid leukemia.    Chemicals: Asbestos, benzene, vinyl chloride, pesticides, fertilizer, wood dust, radon, aflatoxin (a food contaminant), arsenic (a drinking water contaminant), etc.  Radiation: Ultraviolet (sun, tanning beds) and ionizing radiation (radiation therapy, xrays, CT scans).  Chronic inflammation: Chronic infections (Julio-Barr virus, HPV, hepatitis B and C, H. Pylori),  abnormal immune reactions, and conditions like obesity and inflammatory bowel disease can cause DNA damage and cancer.   Hormones: Combined hormone therapy (estrogen and progestin) increases the risk for breast cancer. Hormone therapy with estrogen alone increases the risk for endometrial cancer. Diethylstilbestrol (MAGALI) is a form of estrogen given to some pregnant women from 5845-9737 to prevent premature labor, miscarriages, and other problems. Women who took MAGALI have an increased risk for breast cancers and their daughters have an increased risk for vaginal and cervical cancer.   Medical conditions: Fatty liver disease, cirrhosis, type 2 diabetes, metabolic syndrome (obesity, high blood pressure, high cholesterol, insuline resistance).   Immunosuppression: Due to condition (HIV/AIDS) or taking immunosuppressive medications (after organ transplant, etc).     While most individuals have a family history of cancer, only 5-10% of cancers are hereditary, meaning they are caused by inherited genetic mutations. The remaining cancers are sporadic, meaning they are caused by mutations acquired during the individual's life as a result of aging, environmental exposures, and other causes.     Sporadic cancer (75-80%): Sporadic cancers are random occurrences caused by an accumulation of genetic mutations acquired during the individual's lifetime.   Hereditary cancer (5-10%): Hereditary cancers are caused by a combination of acquired mutations and an inherited mutation in a single gene. A family with a hereditary cancer syndrome will typically have individuals in every generation with the cancers caused by that gene, often diagnosed 10-20 years younger than usual.   Familial cancer (20%): Familial cancer refers to a clustering a cancer in a family that may be more than you would expect to see by chance, but they do not have an inherited mutation in a single gene that caused the cancers. Instead, their cancers are caused by a  combination of shared genetic, environmental, and lifestyle factors.      GERMLINE GENETIC TESTING    Purpose:  Analyze specific genes for inherited mutations.     Results: Positive, negative, and uncertain. A positive result means a cancer-causing mutation was detected. A negative result means no cancer-causing mutations were detected. Uncertain means a genetic change was detected but it is not known if that change results in an increased risk for cancer.     Benefits: When an individual is aware that they have a germline mutation that increases their risk for certain cancers, they can engage in the recommended screening and risk-reduction strategies.     Risks: Genetic discrimination occurs when any entity uses an individual's genetic information to make a decision that negatively impacts them. Examples would include an insurance refusing to issue a policy or an employer refusing to hire someone because they have a germline genetic mutation. The Genetic Information Nondiscrimination Act of 2008 (FIDENCIO) is a federal law that protects healthcare insurance and some employment. This law does not protect elective insurance such as life insurance, long- and short-term disability insurance, long-term care insurance, and cancer policies.     Cost:   Cost with insurance: If testing is covered by insurance, most people pay $0-100 out of pocket for testing. The max anyone typically pays is $250, which is usually if they haven't met their deductible for the year.   Cost without insurance: The cash price for testing is $250. The cash price applies to people who do not have health insurance or have insurance, but their insurance company denied the claim due to not meeting the insurance company's clinical criteria for testing.   Most labs offer financial assistance that can lower the out of pocket amount owed for testing.     REFERENCES   National Comprehensive Cancer Network (NCCN). Genetic/familial high-risk assessment: Breast,  ovarian, pancreatic, and prostate. NCCN Clinical Practice Guidelines in Oncology (NCCN Guidelines), Version 2.2025.      JOHNNY Raman, PA-C  Physician Assistant, Hereditary & High Risk Clinic  Hematology Oncology, [facility]

## 2025-03-18 NOTE — TELEPHONE ENCOUNTER
Called patient and left message to see if she was on her way for the in office 1 pm visit to please call back.

## 2025-03-31 ENCOUNTER — PATIENT MESSAGE (OUTPATIENT)
Dept: HEMATOLOGY/ONCOLOGY | Facility: CLINIC | Age: 60
End: 2025-03-31
Payer: MEDICARE

## 2025-03-31 ENCOUNTER — CLINICAL SUPPORT (OUTPATIENT)
Dept: SMOKING CESSATION | Facility: CLINIC | Age: 60
End: 2025-03-31
Payer: COMMERCIAL

## 2025-03-31 DIAGNOSIS — F17.200 NICOTINE DEPENDENCE: Primary | ICD-10-CM

## 2025-03-31 PROCEDURE — 99999 PR PBB SHADOW E&M-EST. PATIENT-LVL II: CPT | Mod: PBBFAC,,,

## 2025-03-31 PROCEDURE — 99404 PREV MED CNSL INDIV APPRX 60: CPT | Mod: S$GLB,,,

## 2025-03-31 NOTE — PROGRESS NOTES
Individual Follow-Up Form    3/31/2025    Quit Date:     Clinical Status of Patient: Outpatient    Length of Service: 60 minutes    Continuing Medication: yes  Patches or Nicotine Lozenges    Other Medications:      Target Symptoms: Withdrawal and medication side effects. The following were  rated moderate (3) to severe (4) on TCRS:  Moderate (3): urge, crave  Severe (4): none    Comments: Spoke to patient by phone this afternoon. Patient reported that she  is down from 1/2 to 7 cpd. Commended patient on her progress thus far. Patient did start wearing 14 mg Nicotine patch and not having any side effects. Discussed and reviewed lozenge with patient. Patient stated that she has a plan to start using lozenges tomorrow. She has some new goal set starting on April 1st. Reviewed strategies, controlling environment, cues, triggers, new goals set. Introduced high risk situations with preparation interventions, understanding urges, cravings. Encouraged patient to set a goal over the next 2 weeks of 5 cpd.  The patient will continue with  therapy sessions and medication monitoring by CTTS. Prescribed medication management will be by physician. The patient denies any abnormal behavioral or mental changes at this time.        Diagnosis: F17.200    Next Visit: 2 weeks

## 2025-04-04 ENCOUNTER — TELEPHONE (OUTPATIENT)
Dept: PRIMARY CARE CLINIC | Facility: CLINIC | Age: 60
End: 2025-04-04
Payer: MEDICARE

## 2025-04-04 NOTE — TELEPHONE ENCOUNTER
----- Message from Brittney sent at 4/4/2025 12:57 PM CDT -----  Contact: [phone]  [email or web address] Patient is calling for Medical Advice regarding:Patient would like the lidocaine patches in a higher dose than she usually get. Please call and advise. Patient states she is in a lot of pain. Pharmacy name and phone#:[facility] [place] [address] Phone: [phone]: [phone] wants a call back or thru [facility]: callComments:Please call patient back today.

## 2025-04-14 ENCOUNTER — TELEPHONE (OUTPATIENT)
Dept: SMOKING CESSATION | Facility: CLINIC | Age: 60
End: 2025-04-14
Payer: MEDICARE

## 2025-04-28 ENCOUNTER — TELEPHONE (OUTPATIENT)
Dept: SMOKING CESSATION | Facility: CLINIC | Age: 60
End: 2025-04-28
Payer: MEDICARE

## 2025-04-28 NOTE — TELEPHONE ENCOUNTER
Smoking Cessation called patient regarding follow-up visit. Left a message with contact information for patient to call to reschedule follow up.   Margot Verduzco RRT,CTTS,[facility]  [phone]

## 2025-05-06 DIAGNOSIS — F17.200 NICOTINE DEPENDENCE: ICD-10-CM

## 2025-05-06 RX ORDER — IBUPROFEN 200 MG
1 TABLET ORAL DAILY
Qty: 28 PATCH | Refills: 0 | Status: SHIPPED | OUTPATIENT
Start: 2025-05-06

## 2025-05-06 RX ORDER — DM/P-EPHED/ACETAMINOPH/DOXYLAM 30-7.5/3
2 LIQUID (ML) ORAL
Qty: 144 LOZENGE | Refills: 0 | Status: SHIPPED | OUTPATIENT
Start: 2025-05-06

## 2025-05-08 ENCOUNTER — LAB VISIT (OUTPATIENT)
Dept: LAB | Facility: HOSPITAL | Age: 60
End: 2025-05-08
Attending: INTERNAL MEDICINE
Payer: MEDICARE

## 2025-05-08 ENCOUNTER — TELEPHONE (OUTPATIENT)
Dept: PRIMARY CARE CLINIC | Facility: CLINIC | Age: 60
End: 2025-05-08

## 2025-05-08 ENCOUNTER — OFFICE VISIT (OUTPATIENT)
Dept: PRIMARY CARE CLINIC | Facility: CLINIC | Age: 60
End: 2025-05-08
Payer: MEDICARE

## 2025-05-08 VITALS
DIASTOLIC BLOOD PRESSURE: 70 MMHG | BODY MASS INDEX: 37.3 KG/M2 | OXYGEN SATURATION: 95 % | HEIGHT: 64 IN | SYSTOLIC BLOOD PRESSURE: 110 MMHG | HEART RATE: 73 BPM | WEIGHT: 218.5 LBS

## 2025-05-08 DIAGNOSIS — F31.9 BIPOLAR 1 DISORDER: ICD-10-CM

## 2025-05-08 DIAGNOSIS — Z00.00 PREVENTATIVE HEALTH CARE: Primary | ICD-10-CM

## 2025-05-08 DIAGNOSIS — E03.9 HYPOTHYROIDISM, UNSPECIFIED TYPE: ICD-10-CM

## 2025-05-08 DIAGNOSIS — E87.6 HYPOKALEMIA: ICD-10-CM

## 2025-05-08 DIAGNOSIS — E78.2 MIXED HYPERLIPIDEMIA: ICD-10-CM

## 2025-05-08 DIAGNOSIS — G89.29 CHRONIC BILATERAL LOW BACK PAIN WITHOUT SCIATICA: ICD-10-CM

## 2025-05-08 DIAGNOSIS — G20.A1 PARKINSON'S DISEASE, UNSPECIFIED WHETHER DYSKINESIA PRESENT, UNSPECIFIED WHETHER MANIFESTATIONS FLUCTUATE: ICD-10-CM

## 2025-05-08 DIAGNOSIS — M85.851 OSTEOPENIA OF NECK OF RIGHT FEMUR: ICD-10-CM

## 2025-05-08 DIAGNOSIS — R73.03 PREDIABETES: ICD-10-CM

## 2025-05-08 DIAGNOSIS — Z72.0 TOBACCO USE: ICD-10-CM

## 2025-05-08 DIAGNOSIS — E55.9 VITAMIN D DEFICIENCY: ICD-10-CM

## 2025-05-08 DIAGNOSIS — Z78.0 POSTMENOPAUSAL: ICD-10-CM

## 2025-05-08 DIAGNOSIS — M62.81 MUSCLE WEAKNESS-GENERAL: ICD-10-CM

## 2025-05-08 DIAGNOSIS — F51.02 ADJUSTMENT INSOMNIA: ICD-10-CM

## 2025-05-08 DIAGNOSIS — M54.50 CHRONIC BILATERAL LOW BACK PAIN WITHOUT SCIATICA: ICD-10-CM

## 2025-05-08 DIAGNOSIS — J41.0 SIMPLE CHRONIC BRONCHITIS: ICD-10-CM

## 2025-05-08 PROBLEM — E66.01 CLASS 3 SEVERE OBESITY DUE TO EXCESS CALORIES WITH SERIOUS COMORBIDITY AND BODY MASS INDEX (BMI) OF 40.0 TO 44.9 IN ADULT: Status: RESOLVED | Noted: 2024-03-08 | Resolved: 2025-05-08

## 2025-05-08 PROBLEM — E66.813 CLASS 3 SEVERE OBESITY DUE TO EXCESS CALORIES WITH SERIOUS COMORBIDITY AND BODY MASS INDEX (BMI) OF 40.0 TO 44.9 IN ADULT: Status: RESOLVED | Noted: 2024-03-08 | Resolved: 2025-05-08

## 2025-05-08 PROBLEM — S92.425D CLOSED NONDISPLACED FRACTURE OF DISTAL PHALANX OF LEFT GREAT TOE WITH ROUTINE HEALING: Status: RESOLVED | Noted: 2024-09-18 | Resolved: 2025-05-08

## 2025-05-08 PROBLEM — S71.112A LACERATION OF LEFT THIGH: Status: RESOLVED | Noted: 2024-09-18 | Resolved: 2025-05-08

## 2025-05-08 LAB
25(OH)D3+25(OH)D2 SERPL-MCNC: 32 NG/ML (ref 30–96)
ABSOLUTE EOSINOPHIL (OHS): 0.08 K/UL
ABSOLUTE MONOCYTE (OHS): 0.46 K/UL (ref 0.3–1)
ABSOLUTE NEUTROPHIL COUNT (OHS): 3.32 K/UL (ref 1.8–7.7)
ALBUMIN SERPL BCP-MCNC: 3.9 G/DL (ref 3.5–5.2)
ALP SERPL-CCNC: 77 UNIT/L (ref 40–150)
ALT SERPL W/O P-5'-P-CCNC: 5 UNIT/L (ref 10–44)
ANION GAP (OHS): 7 MMOL/L (ref 8–16)
AST SERPL-CCNC: 13 UNIT/L (ref 11–45)
BASOPHILS # BLD AUTO: 0.03 K/UL
BASOPHILS NFR BLD AUTO: 0.4 %
BILIRUB SERPL-MCNC: 0.3 MG/DL (ref 0.1–1)
BILIRUB UR QL STRIP.AUTO: NEGATIVE
BUN SERPL-MCNC: 9 MG/DL (ref 8–23)
CALCIUM SERPL-MCNC: 9.4 MG/DL (ref 8.7–10.5)
CHLORIDE SERPL-SCNC: 100 MMOL/L (ref 95–110)
CHOLEST SERPL-MCNC: 170 MG/DL (ref 120–199)
CHOLEST/HDLC SERPL: 3.9 {RATIO} (ref 2–5)
CLARITY UR: CLEAR
CO2 SERPL-SCNC: 30 MMOL/L (ref 23–29)
COLOR UR AUTO: COLORLESS
CREAT SERPL-MCNC: 0.8 MG/DL (ref 0.5–1.4)
EAG (OHS): 114 MG/DL (ref 68–131)
ERYTHROCYTE [DISTWIDTH] IN BLOOD BY AUTOMATED COUNT: 14.8 % (ref 11.5–14.5)
GFR SERPLBLD CREATININE-BSD FMLA CKD-EPI: >60 ML/MIN/1.73/M2
GLUCOSE SERPL-MCNC: 96 MG/DL (ref 70–110)
GLUCOSE UR QL STRIP: NEGATIVE
HBA1C MFR BLD: 5.6 % (ref 4–5.6)
HCT VFR BLD AUTO: 44.3 % (ref 37–48.5)
HDLC SERPL-MCNC: 44 MG/DL (ref 40–75)
HDLC SERPL: 25.9 % (ref 20–50)
HGB BLD-MCNC: 14.1 GM/DL (ref 12–16)
HGB UR QL STRIP: NEGATIVE
IMM GRANULOCYTES # BLD AUTO: 0.05 K/UL (ref 0–0.04)
IMM GRANULOCYTES NFR BLD AUTO: 0.7 % (ref 0–0.5)
KETONES UR QL STRIP: NEGATIVE
LDLC SERPL CALC-MCNC: 81.8 MG/DL (ref 63–159)
LEUKOCYTE ESTERASE UR QL STRIP: NEGATIVE
LYMPHOCYTES # BLD AUTO: 2.77 K/UL (ref 1–4.8)
MCH RBC QN AUTO: 31.7 PG (ref 27–31)
MCHC RBC AUTO-ENTMCNC: 31.8 G/DL (ref 32–36)
MCV RBC AUTO: 100 FL (ref 82–98)
NITRITE UR QL STRIP: NEGATIVE
NONHDLC SERPL-MCNC: 126 MG/DL
NUCLEATED RBC (/100WBC) (OHS): 0 /100 WBC
PH UR STRIP: 7 [PH]
PLATELET # BLD AUTO: 145 K/UL (ref 150–450)
PMV BLD AUTO: 11.7 FL (ref 9.2–12.9)
POTASSIUM SERPL-SCNC: 4.6 MMOL/L (ref 3.5–5.1)
PROT SERPL-MCNC: 6.7 GM/DL (ref 6–8.4)
PROT UR QL STRIP: NEGATIVE
RBC # BLD AUTO: 4.45 M/UL (ref 4–5.4)
RELATIVE EOSINOPHIL (OHS): 1.2 %
RELATIVE LYMPHOCYTE (OHS): 41.3 % (ref 18–48)
RELATIVE MONOCYTE (OHS): 6.9 % (ref 4–15)
RELATIVE NEUTROPHIL (OHS): 49.5 % (ref 38–73)
SODIUM SERPL-SCNC: 137 MMOL/L (ref 136–145)
SP GR UR STRIP: <1.005
TRIGL SERPL-MCNC: 221 MG/DL (ref 30–150)
UROBILINOGEN UR STRIP-ACNC: NEGATIVE EU/DL
WBC # BLD AUTO: 6.71 K/UL (ref 3.9–12.7)

## 2025-05-08 PROCEDURE — 36415 COLL VENOUS BLD VENIPUNCTURE: CPT | Mod: PN

## 2025-05-08 PROCEDURE — 84439 ASSAY OF FREE THYROXINE: CPT

## 2025-05-08 PROCEDURE — 3008F BODY MASS INDEX DOCD: CPT | Mod: CPTII,S$GLB,, | Performed by: INTERNAL MEDICINE

## 2025-05-08 PROCEDURE — 3078F DIAST BP <80 MM HG: CPT | Mod: CPTII,S$GLB,, | Performed by: INTERNAL MEDICINE

## 2025-05-08 PROCEDURE — 82306 VITAMIN D 25 HYDROXY: CPT

## 2025-05-08 PROCEDURE — 81003 URINALYSIS AUTO W/O SCOPE: CPT

## 2025-05-08 PROCEDURE — 80061 LIPID PANEL: CPT

## 2025-05-08 PROCEDURE — 99999 PR PBB SHADOW E&M-EST. PATIENT-LVL V: CPT | Mod: PBBFAC,,, | Performed by: INTERNAL MEDICINE

## 2025-05-08 PROCEDURE — 80165 DIPROPYLACETIC ACID FREE: CPT

## 2025-05-08 PROCEDURE — 85025 COMPLETE CBC W/AUTO DIFF WBC: CPT

## 2025-05-08 PROCEDURE — 84443 ASSAY THYROID STIM HORMONE: CPT

## 2025-05-08 PROCEDURE — 80053 COMPREHEN METABOLIC PANEL: CPT

## 2025-05-08 PROCEDURE — 3074F SYST BP LT 130 MM HG: CPT | Mod: CPTII,S$GLB,, | Performed by: INTERNAL MEDICINE

## 2025-05-08 PROCEDURE — 83036 HEMOGLOBIN GLYCOSYLATED A1C: CPT

## 2025-05-08 PROCEDURE — 1159F MED LIST DOCD IN RCRD: CPT | Mod: CPTII,S$GLB,, | Performed by: INTERNAL MEDICINE

## 2025-05-08 PROCEDURE — 99396 PREV VISIT EST AGE 40-64: CPT | Mod: S$GLB,,, | Performed by: INTERNAL MEDICINE

## 2025-05-08 RX ORDER — GLYCOPYRROLATE AND FORMOTEROL FUMARATE 9; 4.8 UG/1; UG/1
AEROSOL, METERED RESPIRATORY (INHALATION)
COMMUNITY
Start: 2025-03-01

## 2025-05-08 RX ORDER — ECONAZOLE NITRATE 10 MG/G
CREAM TOPICAL
COMMUNITY

## 2025-05-08 RX ORDER — DIVALPROEX SODIUM 125 MG/1
125 CAPSULE, COATED PELLETS ORAL 2 TIMES DAILY
Qty: 60 CAPSULE | Refills: 11 | Status: SHIPPED | OUTPATIENT
Start: 2025-05-08 | End: 2025-05-08

## 2025-05-08 NOTE — ASSESSMENT & PLAN NOTE
Get COVID, Shingrex  Refer PT at [facility]  Refer to Dr. Rose gyn at   Schedule dental visit     Labs today

## 2025-05-08 NOTE — ASSESSMENT & PLAN NOTE
Start Nicoderm 14mg patches and start nicorette lozenges;   -cont smoking cesstion program at [facility]

## 2025-05-08 NOTE — ASSESSMENT & PLAN NOTE
Stop smoking!  Cont Breztri 2 puffs bid with Albuterol MDI 2 puffs bid prn   -f/u  [facility] with albuterol prn

## 2025-05-08 NOTE — ASSESSMENT & PLAN NOTE
Cont propanolol, tizanidine daily, Sinemet tid and baclofen   F/u Dr. Kohler as scheduled this Monday

## 2025-05-08 NOTE — PATIENT INSTRUCTIONS
Preventative health care  Assessment & Plan:  Get COVID, Shingrex  Refer PT at [facility]  Refer to Dr. Mendoza gyn at   Schedule dental visit     Labs today    Orders:  -     Cancel: Ambulatory referral/consult to Gynecology; Future; Expected date: 05/08/2025  -     Ambulatory referral/consult to Gynecology; Future; Expected date: 05/08/2025    Hypokalemia  -     Cancel: Magnesium; Future; Expected date: 05/08/2025  -     Comprehensive Metabolic Panel; Future; Expected date: 05/08/2025    Bipolar 1 disorder  Assessment & Plan:  Cont Seroquel 300mg bid, cymbalta 60mg bid;  Depakote 500mg qhs (stopped the 250mg qhs since too sleepy)  Check level today     Orders:  -     Discontinue: divalproex (DEPAKOTE SPRINKLE) 125 mg capsule; Take 1 capsule (125 mg total) by mouth 2 (two) times daily.  Dispense: 60 capsule; Refill: 11  -     Valproic Acid Level, Free; Future; Expected date: 05/08/2025    Hypothyroidism, unspecified type  Assessment & Plan:  Check level on Levo 75mcg daily     Orders:  -     CBC Auto Differential; Future; Expected date: 05/08/2025  -     TSH; Future; Expected date: 05/08/2025  -     T4, Free; Future; Expected date: 05/08/2025    Mixed hyperlipidemia  Assessment & Plan:  Check FLP on crestor 10mg daily and Vascepa 2 tabs po bid      Orders:  -     Lipid Panel; Future; Expected date: 05/08/2025    Osteopenia of neck of right femur  Assessment & Plan:  Check Vit D level       Simple chronic bronchitis  Assessment & Plan:  Stop smoking!  Cont Breztri 2 puffs bid - newly prescribed by [facility] with albuterol prn       Adjustment insomnia  Assessment & Plan:  Cont Trazodone 200mg po qhs       Chronic bilateral low back pain without sciatica  Assessment & Plan:  Start Lidoderm 5% patch daily   Can take Tyelnol AR tid   Need to lose weight  Cont baclofen 10mg tid and xanaflex 4mg bid       Orders:  -     Cancel: Ambulatory Referral/Consult to Physical Therapy; Future; Expected date:  05/08/2025  -     Ambulatory Referral/Consult to Physical Therapy; Future; Expected date: 05/08/2025    Parkinson's disease, unspecified whether dyskinesia present, unspecified whether manifestations fluctuate  Assessment & Plan:  Cont propanolol, tizanidine daily, Sinemet tid and baclofen   F/u Dr. Kohler as scheduled       Tobacco use  Assessment & Plan:  Start Nicoderm 14mg patches and start nicorette lozenges;   -cont smoking cesstion program at [facility]       Muscle weakness-general  Assessment & Plan:  Refer to [facility]     Orders:  -     Cancel: Ambulatory Referral/Consult to Physical Therapy; Future; Expected date: 05/08/2025  -     Ambulatory Referral/Consult to Physical Therapy; Future; Expected date: 05/08/2025    Vitamin D deficiency  -     Vitamin D; Future; Expected date: 05/08/2025    Prediabetes  -     Hemoglobin A1C; Future; Expected date: 05/08/2025  -     Urinalysis; Future; Expected date: 05/08/2025

## 2025-05-08 NOTE — ASSESSMENT & PLAN NOTE
Cont Seroquel 300mg bid, cymbalta 60mg bid;  Depakote 500mg qhs (stopped the 250mg qhs since too sleepy)  Check level today

## 2025-05-08 NOTE — ASSESSMENT & PLAN NOTE
Start Lidoderm 5% patch daily   Can take Tyelnol AR tid   Need to lose weight  Cont baclofen 10mg tid and xanaflex 4mg bid

## 2025-05-08 NOTE — PROGRESS NOTES
[facility] Progress Note      Chief Complaint     No chief complaint on file.       Subjective:      History of Present Illness:  Emma Morales is a 59 y.o. female here for annual PE     C/o feeling unstable when going from sitting to standing and concerned that propanolol might be causing her BP and HR to drop too low    Desires to start PT due to generalized deconditioning of arms, back     Vit D def'y: stopped taking Vit D supplements    Simple chronic bronchitis: only smoking 5-7 cigs per day; now on Nicoderm 14mg patches but has not tried  nicorette lozenges; uses smoking cesstion program at [facility]   -now on Breztri 2 puffs bid with albuterol 2 puffs bid - newly prescribed by [facility] with albuterol prn   -had normal 4/2025 LDCT     Hypothyroidism:  takes Levo 75mcg po daily     Fatty liver: check Lfts today     GERD: taking protonix 20mg daily     Insomnia: takes Trazodone  200mg op qhs     Chronic bilateral LBP: not interested in PT at this time; uses baclofen 10mg tid, xanalfex 4mg bid      HLD: takes crestor 10mg daily and Vascepa 2 tabs po bid; is not fasting today and did not take her crestor yesterday    Parkinson's:has appt with Dr. Kohler this Monday; still taking  propanolol but not sure why; takes tizanidine daily, Sinemet tid and baclofen      Bipolar 1 disorder: taking Seroquel 300mg bid, cymbalta 60mg bid; now Depakote 500mg qhs (stopped the 250mg qhs since too sleepy); sees Dr. Davis today    DYLAN/panic attacks; takes klonopin 2mg prn to stressful situations.     FH: breast cancer: maternal aunts, mom, MGM, PGM- would like gene testing        Past Medical History:  Past Medical History:   Diagnosis Date    Abscess of chest wall 06/30/2014    Bipolar 1 disorder     Candidal dermatitis 06/15/2023    Closed nondisplaced fracture of distal phalanx of left great toe with routine healing 09/18/2024    Housing instability after recent homelessness 06/15/2023     "Hyperglycemia 02/15/2023    Hypokalemia 05/08/2025    Laceration of left thigh 09/18/2024    Parkinson disease     Spinal injury        Past Surgical History:  Past Surgical History:   Procedure Laterality Date    abdominal cyst removal Left 2000    BILATERAL SALPINGO-OOPHORECTOMY (BSO)  2008    per patient    HYSTERECTOMY, SUPRACERVICAL  2008    per pt    KNEE SURGERY Bilateral     MOUTH SURGERY      ULNAR NERVE TRANSPOSITION Bilateral 2012    2012, 2014       Allergies:  Review of patient's allergies indicates:   Allergen Reactions    Nsaids (non-steroidal anti-inflammatory drug) Anaphylaxis    Ibuprofen Hives    Indomethacin Hives    Amitriptyline      will kill her    Cefdinir     Doxycycline     Fluconazole     Gabapentin Other (See Comments)     Pt states "she cant remember"     Lamotrigine Hives    Lurasidone     Methylprednisolone     Naproxen Hives    Ciprofloxacin Palpitations       Home Medications:  Current Outpatient Medications   Medication Sig Dispense Refill    albuterol sulfate (PROAIR RESPICLICK) 90 mcg/actuation inhaler Inhale 2 puffs into the lungs every 4 (four) hours. Rescue 1 each 1    baclofen (LIORESAL) 10 MG tablet Take 10 mg by mouth 3 (three) times daily.      BEVESPI AEROSPHERE 9-4.8 mcg HFAA       carbidopa-levodopa-entacapone -200 mg (STALEVO) -200 mg Tab Take 1 tablet by mouth 3 (three) times daily.      clonazepam (KLONOPIN) 2 MG Tab Take 8 mg by mouth 2 (two) times daily as needed.   1    dextroamphetamine-amphetamine 10 mg Tab TK 1 T PO  QID      divalproex (DEPAKOTE) 500 MG TbEC   1    DULoxetine (CYMBALTA) 60 MG capsule Take 60 mg by mouth 2 (two) times daily.      econazole nitrate 1 % cream       fluticasone propionate (FLONASE) 50 mcg/actuation nasal spray 1 spray by Each Nostril route once daily.      icosapent ethyL (VASCEPA) 1 gram Cap Take 2 capsules (2,000 mg total) by mouth 2 (two) times daily. 360 capsule 3    levothyroxine (SYNTHROID) 75 MCG tablet Take 1 " tablet (75 mcg total) by mouth before breakfast. 90 tablet 0    mupirocin (BACTROBAN) 2 % ointment Apply topically 2 (two) times daily. 22 g 3    nicotine (NICODERM CQ) 14 mg/24 hr Place 1 patch onto the skin once daily. 28 patch 0    nicotine polacrilex 2 MG Lozg Take 1 lozenge (2 mg total) by mouth as needed (in place of cigarettes 6-8 times a day). 144 lozenge 0    nystatin (MYCOSTATIN) ointment APPLY TO AFFECTED AREA(S) THREE TIMES A DAY 30 g 2    pantoprazole (PROTONIX) 20 MG tablet Take 20 mg by mouth.      propranolol (INDERAL) 40 MG tablet   6    quetiapine fumarate (SEROQUEL ORAL) Take by mouth.      rosuvastatin (CRESTOR) 10 MG tablet TAKE ONE TABLET BY MOUTH EVERY DAY 90 tablet 3    tiZANidine (ZANAFLEX) 4 MG tablet Take 4 mg by mouth 3 (three) times daily.      trazodone (DESYREL) 100 MG tablet Take 200 mg by mouth every evening.  1     No current facility-administered medications for this visit.        Family History:  Family History   Problem Relation Name Age of Onset    Other (mastectomy) Mother June         for benign breast lumps due to family history    Macular degeneration Mother June     Hypertension Mother June     Heart disease Father Shun     Early death Maternal Grandfather      Breast cancer Maternal Grandmother  60 - 69        60s or later    Cancer Paternal Grandfather  59        chest or brain?    Ovarian cancer Paternal Grandmother  88        possibly    Breast cancer Paternal Grandmother  88    Breast cancer Paternal Cousin Sherly 50 - 59    Breast cancer Maternal Aunt Jocelyn 50 - 59        metastatic recurrence in her 70s    Breast cancer Maternal Aunt Jinny 70 - 79    Heart attack Maternal Uncle Abdiaziz     Other (breast surgery) Maternal Cousin      Colon cancer Maternal Cousin         Social History:  Social History     Tobacco Use    Smoking status: Every Day     Current packs/day: 1.50     Average packs/day: 1.5 packs/day for 45.3 years (68.0 ttl pk-yrs)     Types:  "Cigarettes     Start date: 1/1/1980    Smokeless tobacco: Never    Tobacco comments:     Patches and Lozenges   Substance Use Topics    Alcohol use: No    Drug use: No       Review of Systems:  Pertinent positives and negatives listed in HPI. All other systems are reviewed and are negative.    Health Maintaince :   Health Maintenance Topics with due status: Not Due       Topic Last Completion Date    Lipid Panel 02/15/2023    Mammogram 03/07/2025    LDCT Lung Screen 04/11/2025    RSV Vaccine (Age 60+ and Pregnant patients) Not Due           Eye: UTD   Dental: needs    Immunizations:     Cancer Screening:  PAP: UTD   Mammogram: UTD 3/2025  Colonoscopy:   DEXA:  schedule   LDCT: 4/2025 neg  Colon: s/p colon with Dr. Boyd  in 2024    The 10-year ASCVD risk score (Edward PAGAN, et al., 2019) is: 4.9%    Values used to calculate the score:      Age: 59 years      Sex: Female      Is Non- : No      Diabetic: No      Tobacco smoker: Yes      Systolic Blood Pressure: 110 mmHg      Is BP treated: No      HDL Cholesterol: 55 mg/dL      Total Cholesterol: 169 mg/dL      Objective:   /70 (BP Location: Left arm, Patient Position: Sitting)   Pulse 73   Ht 5' 4" (1.626 m)   Wt 99.1 kg (218 lb 7.6 oz)   LMP  (LMP Unknown) Comment: hysterectomy  SpO2 95%   BMI 37.50 kg/m²      Body mass index is 37.5 kg/m².       Physical Examination:  General: Alert and awake in no apparent distress  HEENT: Normocephalic and atraumatic; Tms WNL  Eyes:  PERRL; EOMi with anicteric sclera and clear conjunctivae  Mouth:  Oropharynx clear and without exudate; moist mucous membranes  Neck:   Cervical nodes not enlarged (No submental, submandibular, preauricular, posterior auricular or occipital adenopathy); supple; no bruits  Cardio:  Regular rate and rhythm with normal S1 and S2; no murmurs or rubs  Resp:  CTAB; respirations unlabored; no wheezes, crackles or rhonchi  Abdom: Soft, NTND with normoactive bowel sounds; " negative HSM  Extrem: Warm and well-perfused with no clubbing, cyanosis or edema; left big toe swollen, erythematous  Skin:  Left distal medial thigh with 3 inc laceration; No rashes, lesions, or color changes  Pulses:  2+ and symmetric distally  Neuro:  AAOx3; cooperative and pleasant with no focal deficits    Laboratory:      Most Recent Data:  Lab Results   Component Value Date    WBC 6.71 05/08/2025    HGB 14.1 05/08/2025    HCT 44.3 05/08/2025     (L) 05/08/2025    CHOL 169 02/15/2023    TRIG 70 02/15/2023    HDL 55 02/15/2023    ALT 8 08/13/2024    AST 17 08/13/2024     08/13/2024    K 4.0 08/13/2024    CL 96 03/25/2023    BUN 14.0 08/13/2024    CO2 31 08/13/2024    TSH 1.012 03/25/2023    HGBA1C 5.3 02/15/2023              CBC:   WBC   Date Value Ref Range Status   05/08/2025 6.71 3.90 - 12.70 K/uL Final     Hemoglobin   Date Value Ref Range Status   03/25/2023 13.4 12.0 - 16.0 g/dL Final     HGB   Date Value Ref Range Status   05/08/2025 14.1 12.0 - 16.0 gm/dL Final     Hematocrit   Date Value Ref Range Status   03/25/2023 41.4 37.0 - 48.5 % Final     HCT   Date Value Ref Range Status   05/08/2025 44.3 37.0 - 48.5 % Final     Platelet Count   Date Value Ref Range Status   05/08/2025 145 (L) 150 - 450 K/uL Final     Platelets   Date Value Ref Range Status   03/25/2023 173 150 - 450 K/uL Final     MCV   Date Value Ref Range Status   05/08/2025 100 (H) 82 - 98 fL Final   03/25/2023 99 (H) 82 - 98 fL Final     RDW   Date Value Ref Range Status   05/08/2025 14.8 (H) 11.5 - 14.5 % Final   03/25/2023 13.4 11.5 - 14.5 % Final     BMP:   Sodium   Date Value Ref Range Status   08/13/2024 143 135 - 146 mmol/L Final   03/25/2023 131 (L) 136 - 145 mmol/L Final     Potassium   Date Value Ref Range Status   08/13/2024 4.0 3.6 - 5.2 mmol/L Final   03/25/2023 4.0 3.5 - 5.1 mmol/L Final     Chloride   Date Value Ref Range Status   03/25/2023 96 95 - 110 mmol/L Final     CO2   Date Value Ref Range Status  "  03/25/2023 24 23 - 29 mmol/L Final     Carbon Dioxide   Date Value Ref Range Status   08/13/2024 31 24 - 32 mmol/L Final     BUN   Date Value Ref Range Status   08/13/2024 14.0 7.0 - 25.0 mg/dL Final   03/25/2023 10 6 - 20 mg/dL Final     Creatinine   Date Value Ref Range Status   08/13/2024 0.92 0.50 - 1.10 mg/dL Final   03/25/2023 0.8 0.5 - 1.4 mg/dL Final     Glucose   Date Value Ref Range Status   03/25/2023 98 70 - 110 mg/dL Final     Calcium   Date Value Ref Range Status   08/13/2024 9.9 8.4 - 10.3 mg/dL Final   03/25/2023 9.3 8.7 - 10.5 mg/dL Final     LFTs:   Total Protein   Date Value Ref Range Status   03/25/2023 6.6 6.0 - 8.4 g/dL Final     Albumin   Date Value Ref Range Status   08/13/2024 4.4 3.4 - 5.0 g/dL Final   03/25/2023 4.1 3.5 - 5.2 g/dL Final     Total Bilirubin   Date Value Ref Range Status   08/13/2024 0.4 <1.3 mg/dL Final   03/25/2023 0.4 0.1 - 1.0 mg/dL Final     Comment:     For infants and newborns, interpretation of results should be based  on gestational age, weight and in agreement with clinical  observations.    Premature Infant recommended reference ranges:  Up to 24 hours.............<8.0 mg/dL  Up to 48 hours............<12.0 mg/dL  3-5 days..................<15.0 mg/dL  6-29 days.................<15.0 mg/dL       AST   Date Value Ref Range Status   08/13/2024 17 <45 U/L Final   03/25/2023 33 10 - 40 U/L Final     Alkaline Phosphatase   Date Value Ref Range Status   03/25/2023 62 55 - 135 U/L Final     ALT   Date Value Ref Range Status   08/13/2024 8 <46 U/L Final   03/25/2023 12 10 - 44 U/L Final     Coags: No results found for: "INR", "PROTIME", "PTT"  FLP:      Lab Results   Component Value Date    CHOL 169 02/15/2023    CHOL 264 (H) 12/14/2010    CHOL 229 (H) 09/22/2010     Lab Results   Component Value Date    HDL 55 02/15/2023    HDL 38 (L) 12/14/2010    HDL 37 (L) 09/22/2010     Lab Results   Component Value Date    LDLCALC 100.0 02/15/2023    LDLCALC 166.2 (H) 12/14/2010 " "   LDLCALC Invalid, Trig > 400 09/22/2010     Lab Results   Component Value Date    TRIG 70 02/15/2023    TRIG 299 (H) 12/14/2010    TRIG 427 (H) 09/22/2010     Lab Results   Component Value Date    CHOLHDL 32.5 02/15/2023    CHOLHDL 14.4 (L) 12/14/2010    CHOLHDL 16.2 (L) 09/22/2010      DM:      Hemoglobin A1C   Date Value Ref Range Status   02/15/2023 5.3 4.0 - 5.6 % Final     Comment:     ADA Screening Guidelines:  5.7-6.4%  Consistent with prediabetes  >or=6.5%  Consistent with diabetes    High levels of fetal hemoglobin interfere with the HbA1C  assay. Heterozygous hemoglobin variants (HbS, HgC, etc)do  not significantly interfere with this assay.   However, presence of multiple variants may affect accuracy.       LDL Cholesterol   Date Value Ref Range Status   02/15/2023 100.0 63.0 - 159.0 mg/dL Final     Comment:     The National Cholesterol Education Program (NCEP) has set the  following guidelines (reference values) for LDL Cholesterol:  Optimal.......................<130 mg/dL  Borderline High...............130-159 mg/dL  High..........................160-189 mg/dL  Very High.....................>190 mg/dL       Creatinine   Date Value Ref Range Status   08/13/2024 0.92 0.50 - 1.10 mg/dL Final   03/25/2023 0.8 0.5 - 1.4 mg/dL Final     Thyroid:   TSH   Date Value Ref Range Status   03/25/2023 1.012 0.400 - 4.000 uIU/mL Final     Free T4   Date Value Ref Range Status   02/15/2023 0.99 0.71 - 1.51 ng/dL Final     T4, Total   Date Value Ref Range Status   12/14/2010 6.7 4.5 - 11.5 ug/dl Final     T3, Total   Date Value Ref Range Status   11/09/2009 106 60 - 180 ng/dl Final     Anemia:   Vitamin B-12   Date Value Ref Range Status   02/08/2010 536 210 - 950 pg/ml Final     Folate   Date Value Ref Range Status   02/08/2010 13.3 4.0 - 24.0 ng/ml Final     Cardiac: No results found for: "TROPONINI", "CKTOTAL", "CKMB", "BNP"  Urinalysis:   Color, UA   Date Value Ref Range Status   05/08/2025 Colorless (A) Straw, " Kristin, Yellow, Light-Orange Final   03/25/2023 Colorless (A) Yellow, Straw, Kristin Final     Spec Grav UA   Date Value Ref Range Status   05/08/2025 <1.005 (A) 1.005 - 1.030 Final     Nitrite, UA   Date Value Ref Range Status   03/25/2023 Negative Negative Final     Nitrites, UA   Date Value Ref Range Status   05/08/2025 Negative Negative Final     Ketones, UA   Date Value Ref Range Status   03/25/2023 Negative Negative Final     Urobilinogen, UA   Date Value Ref Range Status   05/08/2025 Negative <2.0 EU/dL Final       Other Results:  EKG (my interpretation):     Radiology:  X-Ray Toe 2 or More Views Left  Narrative: EXAMINATION:  XR TOE 2 OR MORE VIEWS LEFT    CLINICAL HISTORY:  Pain in left toe(s)    TECHNIQUE:  Three views of the left toes were performed    COMPARISON:  None.    FINDINGS:  Three views left toes.    There is edema about the 1st toe.  On the frontal view, there is vague obliquely oriented fracture involving the medial aspect of the distal phalanx of the 1st toe, fracture plane extends to the articular surface.  No dislocation.  No radiopaque foreign body.  Impression: 1. Findings concerning for fracture involving the distal phalanx of the 1st toe as described.    Electronically signed by: Sukhwinder Bustos MD  Date:    08/19/2024  Time:    19:18          Assessment/Plan     Emma Morales is a 59 y.o. female with:  1. Preventative health care  Assessment & Plan:  Get COVID, Sh[facility]  Refer PT at [facility]  Refer to Dr. Rose gyn at   Schedule dental visit     Labs today    Orders:  -     Cancel: Ambulatory referral/consult to Gynecology; Future; Expected date: 05/08/2025  -     Ambulatory referral/consult to Gynecology; Future; Expected date: 05/08/2025    2. Hypokalemia  -     Cancel: Magnesium; Future; Expected date: 05/08/2025  -     Comprehensive Metabolic Panel; Future; Expected date: 05/08/2025    3. Bipolar 1 disorder  Overview:  Psych dr. Emma Davis    Assessment & Plan:  Cont Seroquel  300mg bid, cymbalta 60mg bid;  Depakote 500mg qhs (stopped the 250mg qhs since too sleepy)  Check level today     Orders:  -     Discontinue: divalproex (DEPAKOTE SPRINKLE) 125 mg capsule; Take 1 capsule (125 mg total) by mouth 2 (two) times daily.  Dispense: 60 capsule; Refill: 11  -     Valproic Acid Level, Free; Future; Expected date: 05/08/2025    4. Hypothyroidism, unspecified type  Assessment & Plan:  Check level on Levo 75mcg daily     Orders:  -     CBC Auto Differential; Future; Expected date: 05/08/2025  -     TSH; Future; Expected date: 05/08/2025  -     T4, Free; Future; Expected date: 05/08/2025    5. Mixed hyperlipidemia  Assessment & Plan:  Check FLP on crestor 10mg daily and Vascepa 2 tabs po bid      Orders:  -     Lipid Panel; Future; Expected date: 05/08/2025    6. Osteopenia of neck of right femur  Overview:  DEXA:  10/2021:  osteopenia bilateral femoral neck (left -1.2; right -1.6)    Assessment & Plan:  Check Vit D level       7. Simple chronic bronchitis  Overview:  had normal 4/2025 LDCT       Assessment & Plan:  Stop smoking!  Cont Breztri 2 puffs bid with Albuterol MDI 2 puffs bid prn   -f/u  [facility] with albuterol prn       8. Adjustment insomnia  Assessment & Plan:  Cont Trazodone 200mg po qhs       9. Chronic bilateral low back pain without sciatica  Overview:  Pt also with bilateral hip pain    Assessment & Plan:  Start Lidoderm 5% patch daily   Can take Tyelnol AR tid   Need to lose weight  Cont baclofen 10mg tid and xanaflex 4mg bid       Orders:  -     Cancel: Ambulatory Referral/Consult to Physical Therapy; Future; Expected date: 05/08/2025  -     Ambulatory Referral/Consult to Physical Therapy; Future; Expected date: 05/08/2025    10. Parkinson's disease, unspecified whether dyskinesia present, unspecified whether manifestations fluctuate  Assessment & Plan:  Cont propanolol, tizanidine daily, Sinemet tid and baclofen   F/u Dr. Kohler as scheduled this  Monday      11. Tobacco use  Overview:  LDCT 4/2025 stable    Assessment & Plan:  Start Nicoderm 14mg patches and start nicorette lozenges;   -cont smoking [facility]st[facility] at [facility]       12. Muscle weakness-general  Assessment & Plan:  Refer to [facility]     Orders:  -     Cancel: Ambulatory Referral/Consult to Physical Therapy; Future; Expected date: 05/08/2025  -     Ambulatory Referral/Consult to Physical Therapy; Future; Expected date: 05/08/2025    13. Vitamin D deficiency  Assessment & Plan:  Check level now off of Vit D med    Orders:  -     Vitamin D; Future; Expected date: 05/08/2025    14. Prediabetes  -     Hemoglobin A1C; Future; Expected date: 05/08/2025  -     Urinalysis; Future; Expected date: 05/08/2025    15. Postmenopausal  Assessment & Plan:  -get DEXA               I spent a total of 42 minutes on the day of the visit.This includes face to face time and non-face to face time preparing to see the patient (eg, review of tests), obtaining and/or reviewing separately obtained history, documenting clinical information in the electronic or other health record, independently interpreting results and communicating results to the patient/family/caregiver, or care coordinator.   Code Status:     Annetta Roe MD

## 2025-05-09 ENCOUNTER — RESULTS FOLLOW-UP (OUTPATIENT)
Dept: PRIMARY CARE CLINIC | Facility: CLINIC | Age: 60
End: 2025-05-09

## 2025-05-09 LAB
T4 FREE SERPL-MCNC: 1.01 NG/DL (ref 0.71–1.51)
TSH SERPL-ACNC: 0.6 UIU/ML (ref 0.4–4)

## 2025-05-10 LAB — VALPROATE FREE SERPL-MCNC: 6 MCG/ML (ref 5–25)

## 2025-05-12 ENCOUNTER — PATIENT OUTREACH (OUTPATIENT)
Dept: ADMINISTRATIVE | Facility: HOSPITAL | Age: 60
End: 2025-05-12
Payer: MEDICARE

## 2025-05-12 NOTE — LETTER
AUTHORIZATION FOR RELEASE OF   CONFIDENTIAL INFORMATION    Dear Dr. Boyd,    We are seeing Emma Morales, date of birth 1965, in the clinic at [facility]. Annetta Roe MD is the patient's PCP. Emma Morales has an outstanding lab/procedure at the time we reviewed her chart. In order to help keep her health information updated, she has authorized us to request the following medical record(s):        (  )  MAMMOGRAM                                      ( x )  COLONOSCOPY      (  )  PAP SMEAR                                          (  )  OUTSIDE LAB RESULTS     (  )  DEXA SCAN                                          (  )  EYE EXAM            (  )  FOOT EXAM                                          (  )  ENTIRE RECORD     (  )  OUTSIDE IMMUNIZATIONS                 (  )  _______________         Please fax records to Annetta Roe MD,  at [phone] or email to [email or web address].      Patient Name: Emma Morales  : 1965  Patient Phone #: [phone]

## 2025-05-14 ENCOUNTER — DOCUMENTATION ONLY (OUTPATIENT)
Dept: HEMATOLOGY/ONCOLOGY | Facility: CLINIC | Age: 60
End: 2025-05-14
Payer: MEDICARE

## 2025-05-15 ENCOUNTER — TELEPHONE (OUTPATIENT)
Dept: PRIMARY CARE CLINIC | Facility: CLINIC | Age: 60
End: 2025-05-15
Payer: MEDICARE

## 2025-05-15 NOTE — PROGRESS NOTES
Hereditary/High Risk Clinic  Department of Hematology and Oncology  [facility]    Cancer Genetics  Results    Patient:  Emma Morales  :  1965  MRN:  [id number]     Referring Provider:   Annetta Roe  [place] [address]     IMPRESSION   Most cancers are sporadic/random occurrences. Approximately 5-10% of cancers are caused by an inherited mutation in a cancer predisposition gene. Germline genetic testing analyzes an individual's DNA to determine if they have an inherited mutation known to cause cancer.     Emma underwent comprehensive genetic testing that included all genes currently associated with hereditary breast, ovarian, and colon cancer due to her family history of:     PGM: Breast and ovarian cancer 88,   Paternal cousin: Breast cancer 50s,   Maternal aunt: Breast cancer 50s,    Maternal aunt: Breast cancer 70s,  70s  MGM: Breast cancer 60s or later,   Maternal cousins: Unaffected. Reportedly underwent genetic testing that was positive for an unknown mutation.     Genes Analyzed (39 total): APC, VALDO, BAP1, BARD1, BMPR1A, BRCA1, BRCA2, BRIP1, CDH1, CDKN2A, CHEK2, FH, FLCN, MET, MLH1, MSH2, MSH6, MUTYH, NF1, NTHL1, PALB2, PMS2, PTEN, RAD51C, RAD51D, SMAD4, STK11, TP53, TSC1, TSC2 and VHL (sequencing and deletion/duplication); AXIN2, HOXB13, MBD4, MSH3, POLD1 and POLE (sequencing only); EPCAM and GREM1 (deletion/duplication only). RNA data is routinely analyzed for use in variant interpretation for all genes.     Result: NEGATIVE  Emma does not have any inherited mutations known to cause cancer.   In regards to her family history of cancer, this is an uninformative negative, as it is unknown if her relatives with cancer have/had a mutation she did not inherit.   This negative result does not completely eliminate the possibility of a cancer-causing mutation. Emma could have a mutation in a gene that was not included on this  "panel or is not detectable using current testing technology. For that reason, I invite her to check in with me periodically to see if updated genetic testing is indicated. Also, if she finds out that her cousins have a mutation in a gene that was not on this panel, we could re-requisition for additional testing.     Breast-Specific History  Age: 59 years  Height:  5' 4"  Weight:  225 pounds  Breast density per BI-RADS:  scattered fibroglandular densities  Age at menarche: 10 years  Age at first live birth: No births  Uterus and ovaries intact: No, s/p supracervical hysterectomy/BSO (2008)   Menopause:  postmenopausal  HRT usage:  never  BRCA testing:  No prior testing   Personal history of ovarian cancer:  No  Personal history of breast biopsy: Mammogram shows bilateral breast biopsy clips. Patient notes she had an abscess removed from her left lateral breast/chest wall in 2014 but no breast biopsies. Unclear what the other clip is from.   History of XRT to chest wall:  No    Breast Cancer Risk Stratification   Current, Estimated Breast Cancer Risk Model Used Patient's Score Risk for general population Patient's Risk Category   5-year [place] Model 1.6% 1.8%  [] N/A given age <35   [x] Average risk (<1.7%)   [] Increased risk (>=1.7%)   10-year Tyrer-Cuzick v8.0b 7.97%   [x] <5%   [] >=5%    Lifetime (to age 85) Tyrer-Cuzick v8.0b 15.7% 7.37%  [] Average risk (<15%)   [x] Intermediate risk (>=15% - <20%)   [] High risk (>=20%)     Recommendations for individuals at intermediate risk for breast cancer:   Breast self-exams beginning at age 25. Promptly report any changes to your provider.   Annual clinical breast exam beginning at age 25.   Annual 3-D screening mammogram beginning at age 40.   Breast cancer risk reduction:   Limit alcohol to one drink per day. Alcoholic beverages increase your risk for breast cancer.   Increase your physical activity. Exercise reduces the risk for breast and other cancers.    Don't " smoke.   Maintain a healthy weight. Evidence suggests that maintaining a body mass index (BMI) of 20 - 25 helps to reduce breast cancer risk.  The use of combined estrogen/progesterone therapy can increase the risk for breast cancer when used for 3 to 5+ years. If you are taking this, please discuss with the prescribing provider.     Recommendations:   Average risk breast cancer screening with breast self-exams, annual clinical breast exam, and annual mammogram. Mammogram is due 3/2026.   Well woman exam. Emma still has a cervix and reports that she hasn't seen a gynecologist in many years.   Colonoscopy - Per patient outside colonoscopy revealed 9 (<5 mm) polyps, some adenomas. Follow up with gastroenterologist for repeat scope.   Cancer risk-reduction strategies.     GENETIC TESTING RESULTS        A copy of the results report is available in [facility].     REFERENCES     National Comprehensive Cancer Network (NCCN). Genetic/familial high-risk assessment: Breast, ovarian, pancreatic, and prostate. NCCN Clinical Practice Guidelines in Oncology (NCCN Guidelines), Version 3.2025.  National Comprehensive Cancer Network (NCCN). Genetic/familial high-risk assessment: Colorectal. NCCN Clinical Practice Guidelines in Oncology (NCCN Guidelines), Version 4.2024.  National Comprehensive Cancer Network (NCCN). Breast cancer risk reduction. NCCN Clinical Practice Guidelines in Oncology (NCCN Guidelines), Version 2.2025.  National Comprehensive Cancer Network (NCCN). Breast cancer screening and diagnosis. NCCN Clinical Practice Guidelines in Oncology (NCCN Guidelines), Version 2.2025.      JOHNNY Raman, PAEdmundoC  Physician Assistant, Hereditary/High Risk Clinic  Hematology/Oncology, [facility]

## 2025-05-15 NOTE — TELEPHONE ENCOUNTER
----- Message from Yusra sent at 5/15/2025  1:28 PM CDT -----  Contact: [phone]  [email or web address] Patient is calling for Medical Advice regarding:Lidocaine patches asking if the level of the MG can be increased for her How long has patient had these symptoms:Pharmacy name and phone#:[facility] [place] - [address]  [facility] [place] [address]: [phone] Fax: [phone] Patient wants a call back or thru [facility]:call back Comments:And she states she is needing this and if so please send to local pharmacy listed above Please advise patient replies from provider may take up to 48 hours.

## 2025-05-23 ENCOUNTER — TELEPHONE (OUTPATIENT)
Dept: PRIMARY CARE CLINIC | Facility: CLINIC | Age: 60
End: 2025-05-23
Payer: MEDICARE

## 2025-05-23 NOTE — TELEPHONE ENCOUNTER
----- Message from Annetta Roe MD sent at 5/22/2025  4:53 PM CDT -----  Regarding: FW: labs  Please let ms Carter know her labs all looked good!!!  ----- Message -----  From: Lab, Background User  Sent: 5/8/2025   8:36 PM CDT  To: Annetta Roe MD

## 2025-05-23 NOTE — TELEPHONE ENCOUNTER
Spoke with pt regarding lab results. Pt verbalized understanding and did not have any questions or concerns.

## 2025-06-02 DIAGNOSIS — B37.2 CANDIDAL DERMATITIS: ICD-10-CM

## 2025-06-03 DIAGNOSIS — M54.41 CHRONIC BILATERAL LOW BACK PAIN WITH BILATERAL SCIATICA: ICD-10-CM

## 2025-06-03 DIAGNOSIS — G89.29 CHRONIC BILATERAL LOW BACK PAIN WITH BILATERAL SCIATICA: ICD-10-CM

## 2025-06-03 DIAGNOSIS — M54.42 CHRONIC BILATERAL LOW BACK PAIN WITH BILATERAL SCIATICA: ICD-10-CM

## 2025-06-03 RX ORDER — NYSTATIN 100000 U/G
OINTMENT TOPICAL
Qty: 30 G | Refills: 2 | Status: SHIPPED | OUTPATIENT
Start: 2025-06-03

## 2025-06-03 RX ORDER — LIDOCAINE 50 MG/G
2 PATCH TOPICAL DAILY
Qty: 60 PATCH | Refills: 5 | Status: SHIPPED | OUTPATIENT
Start: 2025-06-03 | End: 2025-11-30

## 2025-06-23 ENCOUNTER — TELEPHONE (OUTPATIENT)
Dept: SMOKING CESSATION | Facility: CLINIC | Age: 60
End: 2025-06-23
Payer: MEDICARE